# Patient Record
Sex: MALE | Race: WHITE | NOT HISPANIC OR LATINO | Employment: OTHER | URBAN - METROPOLITAN AREA
[De-identification: names, ages, dates, MRNs, and addresses within clinical notes are randomized per-mention and may not be internally consistent; named-entity substitution may affect disease eponyms.]

---

## 2017-02-27 ENCOUNTER — OFFICE VISIT (OUTPATIENT)
Dept: MEDICAL GROUP | Facility: MEDICAL CENTER | Age: 72
End: 2017-02-27
Payer: MEDICARE

## 2017-02-27 VITALS
BODY MASS INDEX: 23.98 KG/M2 | DIASTOLIC BLOOD PRESSURE: 52 MMHG | WEIGHT: 177 LBS | RESPIRATION RATE: 16 BRPM | SYSTOLIC BLOOD PRESSURE: 98 MMHG | OXYGEN SATURATION: 96 % | TEMPERATURE: 98.9 F | HEART RATE: 63 BPM | HEIGHT: 72 IN

## 2017-02-27 DIAGNOSIS — I48.0 PAF (PAROXYSMAL ATRIAL FIBRILLATION) (HCC): ICD-10-CM

## 2017-02-27 DIAGNOSIS — E78.5 HYPERLIPIDEMIA, UNSPECIFIED HYPERLIPIDEMIA TYPE: ICD-10-CM

## 2017-02-27 DIAGNOSIS — Z00.00 MEDICARE ANNUAL WELLNESS VISIT, SUBSEQUENT: ICD-10-CM

## 2017-02-27 DIAGNOSIS — I10 ESSENTIAL HYPERTENSION, BENIGN: ICD-10-CM

## 2017-02-27 DIAGNOSIS — Z86.73 HISTORY OF CVA (CEREBROVASCULAR ACCIDENT): ICD-10-CM

## 2017-02-27 DIAGNOSIS — M17.12 PRIMARY OSTEOARTHRITIS OF LEFT KNEE: ICD-10-CM

## 2017-02-27 PROCEDURE — 1036F TOBACCO NON-USER: CPT | Performed by: INTERNAL MEDICINE

## 2017-02-27 PROCEDURE — G8432 DEP SCR NOT DOC, RNG: HCPCS | Performed by: INTERNAL MEDICINE

## 2017-02-27 PROCEDURE — G0439 PPPS, SUBSEQ VISIT: HCPCS | Performed by: INTERNAL MEDICINE

## 2017-02-27 ASSESSMENT — PATIENT HEALTH QUESTIONNAIRE - PHQ9: CLINICAL INTERPRETATION OF PHQ2 SCORE: 0

## 2017-02-27 ASSESSMENT — PAIN SCALES - GENERAL: PAINLEVEL: NO PAIN

## 2017-02-27 NOTE — MR AVS SNAPSHOT
"        Babar Willett   2017 8:20 AM   Office Visit   MRN: [id number]    Department:  [address] [place] [address]   Dept Phone:  [phone]    Description:  Male : 1945   Provider:  Binu Dave M.D.           Reason for Visit     Annual Wellness Visit           Allergies as of 2017     No Known Allergies      You were diagnosed with     Essential hypertension, benign   [401.1.ICD-9-CM]       Hyperlipidemia, unspecified hyperlipidemia type   [5985770]       History of CVA (cerebrovascular accident)   [190629]       Medicare annual wellness visit, subsequent   [744697]       PAF (paroxysmal atrial fibrillation) (CMS-[facility])   [646816]       Primary osteoarthritis of left knee   [563550]         Vital Signs     Blood Pressure Pulse Temperature Respirations Height Weight    98/52 mmHg 63 37.2 °C (98.9 °F) 16 1.829 m (6' 0.01\") 80.287 kg (177 lb)    Body Mass Index Oxygen Saturation Smoking Status             24.00 kg/m2 96% Former Smoker         Basic Information     Date Of Birth Sex Race Ethnicity Preferred Language    1945 Male White Non- English      Your appointments     Aug 30, 2017  1:20 PM   Established Patient with Binu Dave M.D.   [facility] [address] ([place] [facility])    [address] [facility] [address]  [facility] [id number] [phone]           You will be receiving a confirmation call a few days before your appointment from our automated call confirmation system.              Problem List              ICD-10-CM Priority Class Noted - Resolved    History of CVA (cerebrovascular accident) Z86.73   2015 - Present    PAF (paroxysmal atrial fibrillation) (CMS-HCC) I48.0   2015 - Present    Medicare annual wellness visit, subsequent Z00.00   2015 - Present    Essential hypertension, benign I10   2015 - Present    Hyperlipidemia E78.5   2016 - Present    Primary osteoarthritis of left knee M17.12   2016 - Present      Health Maintenance        Date Due " Completion Dates    COLONOSCOPY 2/18/1997 ---    IMM ZOSTER VACCINE 2/18/2007 ---    IMM DTaP/Tdap/Td Vaccine (2 - Td) 2/5/2023 2/5/2013            Current Immunizations     13-VALENT PCV PREVNAR 10/17/2015    Influenza Vaccine Adult HD 9/17/2016, 10/17/2015, 10/14/2012    Influenza Vaccine Quad Inj (Preserved) 10/29/2013, 10/14/2011, 10/9/2011, 10/9/2010    Pneumococcal polysaccharide vaccine (PPSV-23) 12/30/2014, 9/17/2014    Tdap Vaccine 2/5/2013      Below and/or attached are the medications your provider expects you to take. Review all of your home medications and newly ordered medications with your provider and/or pharmacist. Follow medication instructions as directed by your provider and/or pharmacist. Please keep your medication list with you and share with your provider. Update the information when medications are discontinued, doses are changed, or new medications (including over-the-counter products) are added; and carry medication information at all times in the event of emergency situations     Allergies:  No Known Allergies          Medications  Valid as of: February 27, 2017 -  8:34 AM    Generic Name Brand Name Tablet Size Instructions for use    Atorvastatin Calcium (Tab) LIPITOR 10 MG Take 10 mg by mouth every evening.        Metoprolol Succinate (TABLET SR 24 HR) TOPROL XL 50 MG Take 50 mg by mouth every day.        Metoprolol Tartrate (Tab) LOPRESSOR 25 MG Take 25 mg by mouth 2 times a day.        Ramipril (Cap) ALTACE 2.5 MG Take 2.5 mg by mouth every day.        Rivaroxaban (Tab) XARELTO 20 MG Take 20 mg by mouth with dinner.        .                 Medicines prescribed today were sent to:     [id number] - [place], [place] - [address]    [address] [place] [address]    Phone: [phone] Fax: [phone]    Open 24 Hours?: No      Medication refill instructions:       If your prescription bottle indicates you have medication refills left, it is not necessary to call your  provider’s office. Please contact your pharmacy and they will refill your medication.    If your prescription bottle indicates you do not have any refills left, you may request refills at any time through one of the following ways: The online [employer]-Drive system (except Urgent Care), by calling your provider’s office, or by asking your pharmacy to contact your provider’s office with a refill request. Medication refills are processed only during regular business hours and may not be available until the next business day. Your provider may request additional information or to have a follow-up visit with you prior to refilling your medication.   *Please Note: Medication refills are assigned a new Rx number when refilled electronically. Your pharmacy may indicate that no refills were authorized even though a new prescription for the same medication is available at the pharmacy. Please request the medicine by name with the pharmacy before contacting your provider for a refill.        Your To Do List     Future Labs/Procedures Complete By Expires    BASIC METABOLIC PANEL  As directed 2/28/2018    LIPID PROFILE  As directed 2/28/2018         Hy-Drive Access Code: Activation code not generated  Current Hy-Drive Status: Active

## 2017-02-27 NOTE — ASSESSMENT & PLAN NOTE
He continues atorvastatin. He did not yet get lab tests drawn. We reviewed appropriate diet which he is following. Prior lipid panels were quite good.

## 2017-02-27 NOTE — ASSESSMENT & PLAN NOTE
He is currently wearing a brace over his left knee. He anticipates having a knee replacement in the next few months.

## 2017-02-27 NOTE — PROGRESS NOTES
Chief Complaint   Patient presents with   • Annual Wellness Visit         HPI:  Babar is a 72 y.o. male here for Medicare Annual Wellness Visit and follow-up of his various health problems    Patient Active Problem List    Diagnosis Date Noted   • Primary osteoarthritis of left knee 06/14/2016   • Hyperlipidemia 02/22/2016   • Essential hypertension, benign 08/21/2015   • History of CVA (cerebrovascular accident) 02/20/2015   • PAF (paroxysmal atrial fibrillation) (CMS-HCC) 02/20/2015   • Medicare annual wellness visit, subsequent 02/20/2015       Current Outpatient Prescriptions   Medication Sig Dispense Refill   • metoprolol SR (TOPROL XL) 50 MG TABLET SR 24 HR Take 50 mg by mouth every day.     • ramipril (ALTACE) 2.5 MG Cap Take 2.5 mg by mouth every day.     • atorvastatin (LIPITOR) 10 MG TABS Take 10 mg by mouth every evening.     • rivaroxaban (XARELTO) 20 MG TABS tablet Take 20 mg by mouth with dinner.     • metoprolol (LOPRESSOR) 25 MG TABS Take 25 mg by mouth 2 times a day.       No current facility-administered medications for this visit.        The patient reports adherence to this regimen   Current supplements as per medication list.   Chronic narcotic pain medicines: no    Allergies: Review of patient's allergies indicates no known allergies.    Current social contact/activities: visit with friends, active around the house      Is patient current with immunizations?  Yes      He  reports that he has quit smoking. He has never used smokeless tobacco. He reports that he drinks about 4.2 oz of alcohol per week. He reports that he does not use illicit drugs.  Counseling given: Yes        DPA/Advanced Directive:  Patient does have an advanced directive in Epic.    ROS:    Gait: Uses no assistive device   Ostomy: no   Other tubes: no   Amputations: no   Chronic oxygen use no   Last eye exam 02/2017   : Denies incontinence. He denies significant lightheadedness or headaches or change in vision or hearing or  swallowing. He's had no difficulty with his speech. He denies dyspnea or wheezing or coughing or chest pain or palpitations or indigestion or change in bowel habits or change in urinating. He denies significant ankle swelling. He has arthritis involving his left knee.    Depression Screening    Little interest or pleasure in doing things?  0 - not at all  Feeling down, depressed, or hopeless?  0 - not at all  Patient Health Questionnaire Score: 0    If depressive symptoms identified deferred to follow up visit unless specifically addressed in assessment and plan.    Screening for Cognitive Impairment    Three Minute Recall (banana, sunrise, fence)   3/3    Draw clock face with all 12 numbers set to the hand to show 10 minutes past 11 o'clock  1 5/5  Cognitive concerns identified deferred for follow up unless specifically addressed in assessment and plan.    Fall Risk Assessment    Has the patient had two or more falls in the last year or any fall with injury in the last year?  No    Safety Assessment    Throw rugs on floor.  Yes  Handrails on all stairs.  Yes  Good lighting in all hallways.  Yes  Difficulty hearing.  No  Patient counseled about all safety risks that were identified.    Functional Assessment ADLs    Are there any barriers preventing you from cooking for yourself or meeting nutritional needs?  No.    Are there any barriers preventing you from driving safely or obtaining transportation?  No.    Are there any barriers preventing you from using a telephone or calling for help?  No.    Are there any barriers preventing you from shopping?  No.    Are there any barriers preventing you from taking care of your own finances?  No.    Are there any barriers preventing you from managing your medications?  No.    Are currently engaging any exercise or physical activity?  Yes.       Health Maintenance Summary                COLONOSCOPY Overdue 2/18/1997     IMM ZOSTER VACCINE Overdue 2/18/2007     IMM PNEUMOCOCCAL  "65+ (ADULT) LOW/MEDIUM RISK SERIES Overdue 2/5/2014      Done 2/5/2013 Imm Admin: Pneumococcal Conjugate Vaccine (Prevnar/PCV-13)    IMM INFLUENZA Overdue 9/1/2016     Annual Wellness Visit Next Due 8/25/2017      Done 8/24/2016 Visit Dx: Medicare annual wellness visit, subsequent     Patient has more history with this topic...    IMM DTaP/Tdap/Td Vaccine Next Due 2/5/2023      Done 2/5/2013 Imm Admin: Tdap Vaccine          Patient Care Team:  Binu Dave M.D. as PCP - General (Internal Medicine)  Chava Velez D.O. as Consulting Physician (Cardiology)  Dirk Bess M.D. as Consulting Physician (Neurology)    Social History   Substance Use Topics   • Smoking status: Former Smoker -- 2 years   • Smokeless tobacco: Never Used   • Alcohol Use: 4.2 oz/week     7 Glasses of wine per week     Family History   Problem Relation Age of Onset   • Heart Disease Father    • Heart Attack Father    • Diabetes Sister    • Heart Disease Sister    • Arthritis Sister    • Stroke Brother      He  has a past medical history of History of CVA (cerebrovascular accident) (2/20/2015); PAF (paroxysmal atrial fibrillation) (CMS-HCC) (2/20/2015); Medicare annual wellness visit, subsequent (2/20/2015); Allergy; Stroke (CMS-[facility]); and TIA (transient ischemic attack).   Past Surgical History   Procedure Laterality Date   • Knee arthroscopy     • Inguinal hernia repair       Hernia Repair, Inguinal           Exam:     Blood pressure 98/52, pulse 63, temperature 37.2 °C (98.9 °F), resp. rate 16, height 1.829 m (6' 0.01\"), weight 80.287 kg (177 lb), SpO2 96 %. Body mass index is 24 kg/(m^2).    Hearing good.   Wears hearing aids   Dentition good  Alert, oriented in no acute distress.  Eye contact is good, speech goal directed, affect calm. Fundi are poorly visualized because of small pupils but pupils are equal, round, and reactive to light. Ears are clear, pharynx is unremarkable, neck is supple and without lymphadenopathy. Lungs are " clear without wheezes or rales. Cardiovascular peripheral circulation is satisfactory. Heart sounds are unremarkable. Heart rhythm is regular. Abdomen is soft and without masses or tenderness. There are normal bowel sounds. Genitalia are normal male. Rectal exam is unremarkable with prostate 2-3+ in size and a fairly normal contour and consistency. Extremities are without significant edema, cyanosis, or deformity. Neurologic exam is unremarkable with essentially normal sensation, strength, gait, and cerebellar function.      Assessment and Plan. The following treatment and monitoring plan is recommended:      History of CVA (cerebrovascular accident)  Prior CVA. No recent neurologic changes. He continues to be anticoagulated with Xarelto.    PAF (paroxysmal atrial fibrillation)  He denies recent palpitations or lightheadedness. He is anticoagulated with Xarelto. He denies unusual bleeding or bruising. He is followed for this by Dr. Velez.    Essential hypertension, benign  Blood pressures under good control with ramapril and metoprolol. He denies lightheadedness.    Hyperlipidemia  He continues atorvastatin. He did not yet get lab tests drawn. We reviewed appropriate diet which he is following. Prior lipid panels were quite good.    Primary osteoarthritis of left knee  He is currently wearing a brace over his left knee. He anticipates having a knee replacement in the next few months.            Services needed: No services needed at this time  Health Care Screening recommendations as per orders if indicated.  Referrals offered: PT/OT/Nutrition counseling/Behavioral Health/Smoking cessation as per orders if indicated.    Discussion today about general wellness and lifestyle habits:    · Prevent falls and reduce trip hazards; Cautioned about securing or removing rugs.  · Have a working fire alarm and carbon monoxide detector;   · Engage in regular physical activity and social activities         Follow-up: 6 months  if not sooner.

## 2017-02-27 NOTE — ASSESSMENT & PLAN NOTE
He denies recent palpitations or lightheadedness. He is anticoagulated with Xarelto. He denies unusual bleeding or bruising. He is followed for this by Dr. Velez.

## 2017-08-18 ENCOUNTER — HOSPITAL ENCOUNTER (OUTPATIENT)
Dept: LAB | Facility: MEDICAL CENTER | Age: 72
End: 2017-08-18
Attending: INTERNAL MEDICINE
Payer: MEDICARE

## 2017-08-18 DIAGNOSIS — I10 ESSENTIAL HYPERTENSION, BENIGN: ICD-10-CM

## 2017-08-18 DIAGNOSIS — E78.5 HYPERLIPIDEMIA: ICD-10-CM

## 2017-08-18 LAB
ANION GAP SERPL CALC-SCNC: 8 MMOL/L (ref 0–11.9)
BUN SERPL-MCNC: 12 MG/DL (ref 8–22)
CALCIUM SERPL-MCNC: 9 MG/DL (ref 8.5–10.5)
CHLORIDE SERPL-SCNC: 107 MMOL/L (ref 96–112)
CHOLEST SERPL-MCNC: 168 MG/DL (ref 100–199)
CO2 SERPL-SCNC: 26 MMOL/L (ref 20–33)
CREAT SERPL-MCNC: 0.83 MG/DL (ref 0.5–1.4)
GFR SERPL CREATININE-BSD FRML MDRD: >60 ML/MIN/1.73 M 2
GLUCOSE SERPL-MCNC: 97 MG/DL (ref 65–99)
HDLC SERPL-MCNC: 88 MG/DL
LDLC SERPL CALC-MCNC: 71 MG/DL
POTASSIUM SERPL-SCNC: 4.2 MMOL/L (ref 3.6–5.5)
SODIUM SERPL-SCNC: 141 MMOL/L (ref 135–145)
TRIGL SERPL-MCNC: 44 MG/DL (ref 0–149)

## 2017-08-18 PROCEDURE — 36415 COLL VENOUS BLD VENIPUNCTURE: CPT

## 2017-08-18 PROCEDURE — 80048 BASIC METABOLIC PNL TOTAL CA: CPT

## 2017-08-18 PROCEDURE — 80061 LIPID PANEL: CPT

## 2017-08-30 ENCOUNTER — OFFICE VISIT (OUTPATIENT)
Dept: MEDICAL GROUP | Facility: MEDICAL CENTER | Age: 72
End: 2017-08-30
Payer: MEDICARE

## 2017-08-30 VITALS
DIASTOLIC BLOOD PRESSURE: 72 MMHG | OXYGEN SATURATION: 97 % | BODY MASS INDEX: 23.19 KG/M2 | HEART RATE: 67 BPM | RESPIRATION RATE: 16 BRPM | WEIGHT: 175 LBS | TEMPERATURE: 98.4 F | SYSTOLIC BLOOD PRESSURE: 110 MMHG | HEIGHT: 73 IN

## 2017-08-30 DIAGNOSIS — E78.5 HYPERLIPIDEMIA, UNSPECIFIED HYPERLIPIDEMIA TYPE: ICD-10-CM

## 2017-08-30 DIAGNOSIS — Z12.11 SCREEN FOR COLON CANCER: ICD-10-CM

## 2017-08-30 DIAGNOSIS — Z86.73 HISTORY OF CVA (CEREBROVASCULAR ACCIDENT): ICD-10-CM

## 2017-08-30 DIAGNOSIS — I10 ESSENTIAL HYPERTENSION, BENIGN: ICD-10-CM

## 2017-08-30 DIAGNOSIS — I48.0 PAF (PAROXYSMAL ATRIAL FIBRILLATION) (HCC): ICD-10-CM

## 2017-08-30 PROCEDURE — 99214 OFFICE O/P EST MOD 30 MIN: CPT | Performed by: INTERNAL MEDICINE

## 2017-08-30 NOTE — ASSESSMENT & PLAN NOTE
He continues atorvastatin for his hyperlipidemia. Most recent cholesterol is 168, triglycerides 44, HDL 88, LDL 71. He tries to follow appropriate diet. He exercises regularly.

## 2017-08-30 NOTE — ASSESSMENT & PLAN NOTE
He is due for repeat colonoscopy or stool. Test. He has elected to do the stool fit test. He denies recent change in bowel habits.

## 2017-08-30 NOTE — PROGRESS NOTES
Subjective:     Chief Complaint   Patient presents with   • Follow-Up     routine f/u     Babar Willett is a 72 y.o. male here today forFollow-up of his various health problems.    Screen for colon cancer  He is due for repeat colonoscopy or stool. Test. He has elected to do the stool fit test. He denies recent change in bowel habits.    PAF (paroxysmal atrial fibrillation)  He denies recent palpitations or lightheadedness. He continues anticoagulation with Xarelto.    Hyperlipidemia  He continues atorvastatin for his hyperlipidemia. Most recent cholesterol is 168, triglycerides 44, HDL 88, LDL 71. He tries to follow appropriate diet. He exercises regularly.    History of CVA (cerebrovascular accident)  Prior CVA, no current evidence of any neurologic changes that he is aware of.    Essential hypertension, benign  He continues ramipril for his hypertension. Blood pressure is satisfactory. He denies lightheadedness.       Diagnoses of Essential hypertension, benign, Hyperlipidemia, unspecified hyperlipidemia type, Screen for colon cancer, PAF (paroxysmal atrial fibrillation) (CMS-[facility]), and History of CVA (cerebrovascular accident) were pertinent to this visit.    Allergies: Review of patient's allergies indicates no known allergies.  Current medicines (including changes today)  Current Outpatient Prescriptions   Medication Sig Dispense Refill   • metoprolol SR (TOPROL XL) 50 MG TABLET SR 24 HR Take 50 mg by mouth every day.     • ramipril (ALTACE) 2.5 MG Cap Take 2.5 mg by mouth every day.     • atorvastatin (LIPITOR) 10 MG TABS Take 10 mg by mouth every evening.     • rivaroxaban (XARELTO) 20 MG TABS tablet Take 20 mg by mouth with dinner.     • metoprolol (LOPRESSOR) 25 MG TABS Take 25 mg by mouth 2 times a day.       No current facility-administered medications for this visit.        He  has a past medical history of Allergy; History of CVA (cerebrovascular accident) (2/20/2015); Medicare annual wellness visit,  "subsequent (2/20/2015); PAF (paroxysmal atrial fibrillation) (CMS-HCC) (2/20/2015); Stroke (CMS-HCC); and TIA (transient ischemic attack).  Health Maintenance: He thinks he is up-to-date on everything. He will try to bring in a date of his Zostavax.  ROS    Patient denies significant change in strength, weight or appetite.  No significant lightheadedness or headaches.  No change in vision, hearing, or swallowing.  No new dyspnea, coughing, chest pain, or palpitations.  No indigestion, abdominal pain, or change in bowel habits.  No change in urinating.  No new ankle swelling.       Objective:     PE:  /72   Pulse 67   Temp 36.9 °C (98.4 °F)   Resp 16   Ht 1.854 m (6' 1\")   Wt 79.4 kg (175 lb)   SpO2 97%   BMI 23.09 kg/m²    Neck is supple without significant lymphadenopathy or masses.  Lungs are clear with normal breath sounds without wheezes or rales .  Cardiovascular: peripheral circulation is satisfactory, heart sounds are unchanged and unremarkable.  Abdomen is soft, without masses or tenderness, with normal bowel sounds.  Extremities are without significant edema, cyanosis or deformity.      Assessment and Plan:   The following treatment plan was discussed  1. Essential hypertension, benign  BASIC METABOLIC PANEL    No medication change. We reviewed low-salt diet.   2. Hyperlipidemia, unspecified hyperlipidemia type  LIPID PROFILE    Continue atorvastatin. We reviewed appropriate diet.   3. Screen for colon cancer  OCCULT BLOOD FECES IMMUNOASSAY (FIT)    He was given a kit for stool fit test.   4. PAF (paroxysmal atrial fibrillation) (CMS-[facility])      He will report any significant palpitations or lightheadedness.   5. History of CVA (cerebrovascular accident)      He was instructed to go to the emergency room if he notes any neurologic changes.       Followup: Return in about 5 months (around 2/12/2018), or wellness.           "

## 2017-08-30 NOTE — ASSESSMENT & PLAN NOTE
He continues ramipril for his hypertension. Blood pressure is satisfactory. He denies lightheadedness.

## 2017-09-29 ENCOUNTER — HOSPITAL ENCOUNTER (OUTPATIENT)
Facility: MEDICAL CENTER | Age: 72
End: 2017-09-29
Attending: INTERNAL MEDICINE
Payer: MEDICARE

## 2017-09-29 PROCEDURE — 82274 ASSAY TEST FOR BLOOD FECAL: CPT

## 2017-10-03 DIAGNOSIS — Z12.11 SCREEN FOR COLON CANCER: ICD-10-CM

## 2017-10-03 LAB — HEMOCCULT STL QL IA: NEGATIVE

## 2017-10-13 ENCOUNTER — OFFICE VISIT (OUTPATIENT)
Dept: MEDICAL GROUP | Facility: MEDICAL CENTER | Age: 72
End: 2017-10-13
Payer: MEDICARE

## 2017-10-13 VITALS
OXYGEN SATURATION: 97 % | SYSTOLIC BLOOD PRESSURE: 110 MMHG | DIASTOLIC BLOOD PRESSURE: 70 MMHG | TEMPERATURE: 97.9 F | BODY MASS INDEX: 23.59 KG/M2 | HEIGHT: 73 IN | WEIGHT: 178 LBS | RESPIRATION RATE: 12 BRPM | HEART RATE: 55 BPM

## 2017-10-13 DIAGNOSIS — R22.2 MASS OF RIGHT CHEST WALL: ICD-10-CM

## 2017-10-13 PROCEDURE — 99213 OFFICE O/P EST LOW 20 MIN: CPT | Performed by: INTERNAL MEDICINE

## 2017-10-13 ASSESSMENT — PAIN SCALES - GENERAL: PAINLEVEL: NO PAIN

## 2017-10-13 NOTE — ASSESSMENT & PLAN NOTE
This patient reports that on Wednesday he noticed a tender lump on the right chest wall just lateral to the areola. It was tender. He is not sure but thinks that it is better today. He reports that last Friday he was moving some heavy wood rounds and may have injured himself. He is not taking any new supplements and has not eaten much soy or tofu. He did have a prior left chest wall mass that turned out to be nothing.

## 2017-10-13 NOTE — PROGRESS NOTES
"Subjective:     Chief Complaint   Patient presents with   • Breast Mass     right side noticed on wed James STERLING Willett is a 72 y.o. male here today forEvaluation of a right chest wall mass.    Mass of right chest wall  This patient reports that on Wednesday he noticed a tender lump on the right chest wall just lateral to the areola. It was tender. He is not sure but thinks that it is better today. He reports that last Friday he was moving some heavy wood rounds and may have injured himself. He is not taking any new supplements and has not eaten much soy or tofu. He did have a prior left chest wall mass that turned out to be nothing.       The encounter diagnosis was Mass of right chest wall.    Allergies: Review of patient's allergies indicates no known allergies.  Current medicines (including changes today)  Current Outpatient Prescriptions   Medication Sig Dispense Refill   • metoprolol SR (TOPROL XL) 50 MG TABLET SR 24 HR Take 50 mg by mouth every day.     • ramipril (ALTACE) 2.5 MG Cap Take 2.5 mg by mouth every day.     • atorvastatin (LIPITOR) 10 MG TABS Take 10 mg by mouth every evening.     • rivaroxaban (XARELTO) 20 MG TABS tablet Take 20 mg by mouth with dinner.     • metoprolol (LOPRESSOR) 25 MG TABS Take 25 mg by mouth 2 times a day.       No current facility-administered medications for this visit.        He  has a past medical history of Allergy; History of CVA (cerebrovascular accident) (2/20/2015); Medicare annual wellness visit, subsequent (2/20/2015); PAF (paroxysmal atrial fibrillation) (CMS-[facility]) (2/20/2015); Stroke (CMS-HCC); and TIA (transient ischemic attack).    ROS  No new dyspnea, coughing, chest pain, or palpitations.  No indigestion, abdominal pain, or change in bowel habits.         Objective:     PE:  /70   Pulse (!) 55   Temp 36.6 °C (97.9 °F)   Resp 12   Ht 1.854 m (6' 0.99\")   Wt 80.7 kg (178 lb)   SpO2 97%   BMI 23.49 kg/m²    Neck is supple without significant " lymphadenopathy or masses.  Lungs are clear with normal breath sounds without wheezes or rales .  Cardiovascular: peripheral circulation is satisfactory, heart sounds are unchanged and unremarkable. There is a slightly oblong mass measuring about a centimeter just lateral to the right areola. There is no other significant palpable abnormality nor area of tenderness. There is no nipple discharge.  Abdomen is soft, without masses or tenderness, with normal bowel sounds.        Assessment and Plan:   The following treatment plan was discussed  1. Mass of right chest wall      Since it is improving, report back next week. If it is not resolved then get a mammogram and evaluate further as indicated.       Followup:He will report back to us next week especially since it seems to be improving.  If it is not totally resolved, we will proceed with mammogram. He otherwise was encouraged to keep his next scheduled appointment.

## 2017-11-06 DIAGNOSIS — N62 GYNECOMASTIA, MALE: ICD-10-CM

## 2017-11-20 ENCOUNTER — HOSPITAL ENCOUNTER (OUTPATIENT)
Dept: RADIOLOGY | Facility: MEDICAL CENTER | Age: 72
End: 2017-11-20

## 2017-11-21 ENCOUNTER — HOSPITAL ENCOUNTER (OUTPATIENT)
Dept: RADIOLOGY | Facility: MEDICAL CENTER | Age: 72
End: 2017-11-21
Attending: INTERNAL MEDICINE
Payer: MEDICARE

## 2017-11-21 DIAGNOSIS — N62 GYNECOMASTIA, MALE: ICD-10-CM

## 2017-11-21 PROCEDURE — 76642 ULTRASOUND BREAST LIMITED: CPT | Mod: RT

## 2017-11-21 PROCEDURE — G0279 TOMOSYNTHESIS, MAMMO: HCPCS

## 2018-02-08 DIAGNOSIS — H91.10 PRESBYCUSIS, UNSPECIFIED LATERALITY: ICD-10-CM

## 2018-02-21 ENCOUNTER — HOSPITAL ENCOUNTER (OUTPATIENT)
Dept: LAB | Facility: MEDICAL CENTER | Age: 73
End: 2018-02-21
Attending: INTERNAL MEDICINE
Payer: MEDICARE

## 2018-02-21 DIAGNOSIS — I10 ESSENTIAL HYPERTENSION, BENIGN: ICD-10-CM

## 2018-02-21 DIAGNOSIS — E78.5 HYPERLIPIDEMIA, UNSPECIFIED HYPERLIPIDEMIA TYPE: ICD-10-CM

## 2018-02-21 LAB
ANION GAP SERPL CALC-SCNC: 4 MMOL/L (ref 0–11.9)
BUN SERPL-MCNC: 13 MG/DL (ref 8–22)
CALCIUM SERPL-MCNC: 9.2 MG/DL (ref 8.5–10.5)
CHLORIDE SERPL-SCNC: 105 MMOL/L (ref 96–112)
CHOLEST SERPL-MCNC: 172 MG/DL (ref 100–199)
CO2 SERPL-SCNC: 28 MMOL/L (ref 20–33)
CREAT SERPL-MCNC: 0.91 MG/DL (ref 0.5–1.4)
GLUCOSE SERPL-MCNC: 103 MG/DL (ref 65–99)
HDLC SERPL-MCNC: 86 MG/DL
LDLC SERPL CALC-MCNC: 74 MG/DL
POTASSIUM SERPL-SCNC: 4.4 MMOL/L (ref 3.6–5.5)
SODIUM SERPL-SCNC: 137 MMOL/L (ref 135–145)
TRIGL SERPL-MCNC: 60 MG/DL (ref 0–149)

## 2018-02-21 PROCEDURE — 36415 COLL VENOUS BLD VENIPUNCTURE: CPT

## 2018-02-21 PROCEDURE — 80061 LIPID PANEL: CPT

## 2018-02-21 PROCEDURE — 80048 BASIC METABOLIC PNL TOTAL CA: CPT

## 2018-02-27 ENCOUNTER — TELEPHONE (OUTPATIENT)
Dept: MEDICAL GROUP | Facility: MEDICAL CENTER | Age: 73
End: 2018-02-27

## 2018-02-27 NOTE — TELEPHONE ENCOUNTER
Future Appointments       Provider Department Center    2/28/2018 3:40 PM Binu Dave M.D.; [address]          ANNUAL WELLNESS VISIT PRE-VISIT PLANNING WITHOUT OUTREACH    1.  Reviewed note from last office visit with PCP: YES Last [id number]    2.  If any orders were placed at last visit, do we have Results/Consult Notes?        •  Labs - Labs ordered, completed on 10/21/18 and results are in chart.       •  Imaging - Imaging ordered, completed and results are in chart. 02/15/18 MR KNEE       •  Referrals - Referral ordered, patient was seen and consult notes are in chart. Care Teams updated  YES.    3.  Immunizations were updated in [employer] using [employer]?: Yes       •  [employer] Recommendations: ZOSTAVAX (Shingles)        •  Is patient due for Tdap? NO       •  Is patient due for Shingles? YES. Patient was not notified of copay/out of pocket cost.     4.  Patient is due for the following Health Maintenance Topics:   Health Maintenance Due   Topic Date Due   • IMM ZOSTER VACCINE  NEEDS SCRIPT       5.  Reviewed/Updated the following with patient:       •   Preferred Pharmacy? YES       •   Preferred Lab? YES       •   Preferred Communication? YES       •   Allergies? YES       •   Medications? YES. Was Abstract Encounter opened and chart updated? YES       •   Social History? YES. Was Abstract Encounter opened and chart updated? YES       •   Family History (document living status of immediate family members and if + hx of  cancer, diabetes, hypertension, hyperlipidemia, heart attack, stroke) YES. Was Abstract Encounter opened and chart updated? YES    6.  Care Team Updated:       •   DME Company (gait device, O2, CPAP, etc.): YES       •   Other Specialists (eye doctor, derm, GYN, cardiology, endo, etc): YES    7.  Patient has the following Care Path diagnoses on Problem List:  NONE    8.  MDX printed and highlighted for Provider? MCR    9.  Patient was advised:  “This is a free wellness visit. The provider will screen for medical conditions to help you stay healthy. If you have other concerns to address you may be asked to discuss these at a separate visit or there may be an additional fee.”     10.  Patient was informed to arrive 15 min prior to their scheduled appointment and bring in their medication bottles.

## 2018-02-28 ENCOUNTER — OFFICE VISIT (OUTPATIENT)
Dept: MEDICAL GROUP | Facility: MEDICAL CENTER | Age: 73
End: 2018-02-28
Payer: MEDICARE

## 2018-02-28 VITALS
TEMPERATURE: 98.7 F | DIASTOLIC BLOOD PRESSURE: 64 MMHG | SYSTOLIC BLOOD PRESSURE: 110 MMHG | BODY MASS INDEX: 24.24 KG/M2 | WEIGHT: 179 LBS | HEART RATE: 77 BPM | OXYGEN SATURATION: 99 % | RESPIRATION RATE: 16 BRPM | HEIGHT: 72 IN

## 2018-02-28 DIAGNOSIS — Z86.73 HISTORY OF CVA (CEREBROVASCULAR ACCIDENT): ICD-10-CM

## 2018-02-28 DIAGNOSIS — I10 ESSENTIAL HYPERTENSION, BENIGN: ICD-10-CM

## 2018-02-28 DIAGNOSIS — Z00.00 MEDICARE ANNUAL WELLNESS VISIT, SUBSEQUENT: ICD-10-CM

## 2018-02-28 DIAGNOSIS — E78.5 HYPERLIPIDEMIA, UNSPECIFIED HYPERLIPIDEMIA TYPE: ICD-10-CM

## 2018-02-28 DIAGNOSIS — R22.2 MASS OF RIGHT CHEST WALL: ICD-10-CM

## 2018-02-28 DIAGNOSIS — I48.0 PAF (PAROXYSMAL ATRIAL FIBRILLATION) (HCC): ICD-10-CM

## 2018-02-28 PROCEDURE — G0439 PPPS, SUBSEQ VISIT: HCPCS | Performed by: INTERNAL MEDICINE

## 2018-02-28 ASSESSMENT — PAIN SCALES - GENERAL: PAINLEVEL: 2=MINIMAL-SLIGHT

## 2018-02-28 ASSESSMENT — ACTIVITIES OF DAILY LIVING (ADL): BATHING_REQUIRES_ASSISTANCE: 0

## 2018-02-28 ASSESSMENT — PATIENT HEALTH QUESTIONNAIRE - PHQ9: CLINICAL INTERPRETATION OF PHQ2 SCORE: 0

## 2018-02-28 NOTE — PROGRESS NOTES
Chief Complaint   Patient presents with   • Annual Wellness Visit         HPI:  Babar is a 73 y.o. here for Medicare Annual Wellness Visit    Patient Active Problem List    Diagnosis Date Noted   • Mass of right chest wall 10/13/2017   • Screen for colon cancer 08/30/2017   • Hyperlipidemia 02/22/2016   • Essential hypertension, benign 08/21/2015   • History of CVA (cerebrovascular accident) 02/20/2015   • PAF (paroxysmal atrial fibrillation) (CMS-HCC) 02/20/2015   • Medicare annual wellness visit, subsequent 02/20/2015       Current Outpatient Prescriptions   Medication Sig Dispense Refill   • metoprolol SR (TOPROL XL) 50 MG TABLET SR 24 HR Take 50 mg by mouth every day.     • ramipril (ALTACE) 2.5 MG Cap Take 2.5 mg by mouth every day.     • atorvastatin (LIPITOR) 10 MG TABS Take 10 mg by mouth every evening.     • rivaroxaban (XARELTO) 20 MG TABS tablet Take 20 mg by mouth with dinner.     • metoprolol (LOPRESSOR) 25 MG TABS Take 25 mg by mouth 2 times a day.       No current facility-administered medications for this visit.         Patient is taking medications as noted in medication list.  Current supplements as per medication list.     Allergies: Patient has no known allergies.    Current social contact/activities: Visit with family and friends, Working as a  12 hrs 4x per wk,      Is patient current with immunizations? No, due for ZOSTAVAX (Shingles). Patient is interested in receiving NONE today.    He  reports that he quit smoking about 49 years ago. His smoking use included Cigarettes. He has a 1.00 pack-year smoking history. He has never used smokeless tobacco. He reports that he drinks about 4.2 oz of alcohol per week . He reports that he does not use drugs.  Counseling given: Not Answered        DPA/Advanced directive: Patient has Advanced Directive on file.     ROS:    Gait: Uses no assistive device   Ostomy: no   Other tubes: no   Amputations: no   Chronic oxygen use no   Last eye exam  02/13/18   Wears hearing aids: yes  He denies history of hepatitis, jaundice, liver disease, or bleeding dyscrasia.  : Denies any urinary leakage during the last 6 months     Depression Screening    Little interest or pleasure in doing things?  0 - not at all  Feeling down, depressed, or hopeless? 0 - not at all  Patient Health Questionnaire Score: 0    If depressive symptoms identified deferred to follow up visit unless specifically addressed in assessment and plan.    Interpretation of PHQ-9 Total Score   Score Severity   1-4 No Depression   5-9 Mild Depression   10-14 Moderate Depression   15-19 Moderately Severe Depression   20-27 Severe Depression    Screening for Cognitive Impairment    Three Minute Recall (apple, watch, cheri)   3/3 Village, Kitchen, Baby  Draw clock face with all 12 numbers set to the hand to show 10 minutes past 11 o'clock  1 4/5 Time 3:40  If cognitive concerns identified, deferred for follow up unless specifically addressed in assessment and plan.    Fall Risk Assessment    Has the patient had two or more falls in the last year or any fall with injury in the last year?  No  If fall risk identified, deferred for follow up unless specifically addressed in assessment and plan.    Safety Assessment    Throw rugs on floor.  No  Handrails on all stairs.  Yes  Good lighting in all hallways.  Yes  Difficulty hearing.  No  Patient counseled about all safety risks that were identified.    Functional Assessment ADLs    Are there any barriers preventing you from cooking for yourself or meeting nutritional needs?  No.    Are there any barriers preventing you from driving safely or obtaining transportation?  No.    Are there any barriers preventing you from using a telephone or calling for help?  No.    Are there any barriers preventing you from shopping?  No.    Are there any barriers preventing you from taking care of your own finances?  No.    Are there any barriers preventing you from managing  your medications?  No.    Are there any barriers preventing you from showering/bathing yourself?  No.    Are you currently engaging any exercise or physical activity?  Yes.       Health Maintenance Summary                IMM ZOSTER VACCINE Overdue 2/18/2007     Annual Wellness Visit Overdue 2/28/2018      Done 2/27/2017 Visit Dx: Medicare annual wellness visit, subsequent     Patient has more history with this topic...    COLON CANCER SCREENING ANNUAL FIT Next Due 9/29/2018      Done 9/29/2017 OCCULT BLOOD FECES IMMUNOASSAY     Patient has more history with this topic...    IMM DTaP/Tdap/Td Vaccine Next Due 2/5/2023      Done 2/5/2013 Imm Admin: Tdap Vaccine          Patient Care Team:  Binu Dave M.D. as PCP - General (Internal Medicine)  Chava Velez D.O. as Consulting Physician (Cardiology)  Deborah Alan O.D. as Consulting Physician (Optometry)  JONATHAN Darden. as Consulting Physician (Audiology)  Howard Mckeon M.D. as Consulting Physician (Orthopaedics)    Social History   Substance Use Topics   • Smoking status: Former Smoker     Packs/day: 0.50     Years: 2.00     Types: Cigarettes     Quit date: 1/1/1969   • Smokeless tobacco: Never Used      Comment: Started smoking at age 22   • Alcohol use 4.2 oz/week     7 Glasses of wine per week     Family History   Problem Relation Age of Onset   • Heart Disease Father      Arteriosclerosis    • Heart Attack Father    • Arthritis Sister    • No Known Problems Brother    • Other Mother      Glu[place]oma   • Stroke Brother      TIA   • Prostate cancer Brother    • Heart Disease Sister      Pacemaker   • Other Sister      Cataracts   • Cancer Maternal Aunt      GI     He  has a past medical history of Allergy; History of CVA (cerebrovascular accident) (2/20/2015); Medicare annual wellness visit, subsequent (2/20/2015); PAF (paroxysmal atrial fibrillation) (CMS-HCC) (2/20/2015); Stroke (CMS-[facility]); and TIA (transient ischemic attack).   Past Surgical  History:   Procedure Laterality Date   • INGUINAL HERNIA REPAIR      Hernia Repair, Inguinal   • KNEE ARTHROSCOPY             Exam:     Blood pressure 110/64, pulse 77, temperature 37.1 °C (98.7 °F), resp. rate 16, height 1.829 m (6'), weight 81.2 kg (179 lb), SpO2 99 %. Body mass index is 24.28 kg/m².    Hearing good. Has hearing aids   Dentition fair  Alert, oriented in no acute distress.  Eye contact is good, speech goal directed, affect calm. Neck is supple and without significant lymphadenopathy. Lungs are clear without rales or wheeze. Cardiovascular peripheral circulation is satisfactory. Heart sounds are unremarkable. Heart rhythm is regular. Abdomen is soft without masses or tenderness. There are normal bowel sounds. Extremities are without significant edema, cyanosis, or deformity.    Neurologic exam is unremarkable with essentially normal sensation, strength, gait, and cerebellar functions.      Assessment and Plan. The following treatment and monitoring plan is recommended:      History of CVA (cerebrovascular accident)  Prior stroke with no new neurologic changes. He knows to go to the emergency room if he notes symptoms of a stroke.    PAF (paroxysmal atrial fibrillation)  Paroxysmal atrial fibrillation for which he is anticoagulated with Xarelto. He has not noticed any recent palpitations or lightheadedness.    Essential hypertension, benign  Blood pressure is currently under good control. He denies lightheadedness. He continues ramipril without difficulty. We again reviewed low-salt diet.    Hyperlipidemia  He takes atorvastatin for his hyperlipidemia. He tries to follow appropriate diet. He exercises quite a bit. He is not overweight.    Mass of right chest wall  The right chest wall mass has essentially resolved. Mammogram was unremarkable.    .      Services suggested: No services needed at this time  Health Care Screening recommendations as per orders if indicated.  Referrals offered:  PT/OT/Nutrition counseling/Behavioral Health/Smoking cessation as per orders if indicated.    Discussion today about general wellness and lifestyle habits:    · Prevent falls and reduce trip hazards; Cautioned about securing or removing rugs.  · Have a working fire alarm and carbon monoxide detector;   · Engage in regular physical activity and social activities       Follow-up: He will return in 6 months for follow-up especially of his hypertension and hyperlipidemia. He will contact us otherwise as needed sooner.    He anticipates having meniscus surgery on his right knee this Friday. At this time I see no significant medical contraindication to the contemplated surgery.

## 2018-03-01 NOTE — ASSESSMENT & PLAN NOTE
Prior stroke with no new neurologic changes. He knows to go to the emergency room if he notes symptoms of a stroke.

## 2018-03-01 NOTE — ASSESSMENT & PLAN NOTE
He takes atorvastatin for his hyperlipidemia. He tries to follow appropriate diet. He exercises quite a bit. He is not overweight.

## 2018-03-01 NOTE — ASSESSMENT & PLAN NOTE
Paroxysmal atrial fibrillation for which he is anticoagulated with Xarelto. He has not noticed any recent palpitations or lightheadedness.

## 2018-03-01 NOTE — ASSESSMENT & PLAN NOTE
Blood pressure is currently under good control. He denies lightheadedness. He continues ramipril without difficulty. We again reviewed low-salt diet.

## 2018-08-24 ENCOUNTER — HOSPITAL ENCOUNTER (OUTPATIENT)
Dept: LAB | Facility: MEDICAL CENTER | Age: 73
End: 2018-08-24
Attending: INTERNAL MEDICINE
Payer: MEDICARE

## 2018-08-24 DIAGNOSIS — E78.5 HYPERLIPIDEMIA, UNSPECIFIED HYPERLIPIDEMIA TYPE: ICD-10-CM

## 2018-08-24 DIAGNOSIS — I10 ESSENTIAL HYPERTENSION, BENIGN: ICD-10-CM

## 2018-08-24 PROCEDURE — 80061 LIPID PANEL: CPT

## 2018-08-24 PROCEDURE — 80048 BASIC METABOLIC PNL TOTAL CA: CPT

## 2018-08-24 PROCEDURE — 36415 COLL VENOUS BLD VENIPUNCTURE: CPT

## 2018-08-25 LAB
ANION GAP SERPL CALC-SCNC: 4 MMOL/L (ref 0–11.9)
BUN SERPL-MCNC: 12 MG/DL (ref 8–22)
CALCIUM SERPL-MCNC: 9.2 MG/DL (ref 8.5–10.5)
CHLORIDE SERPL-SCNC: 107 MMOL/L (ref 96–112)
CHOLEST SERPL-MCNC: 182 MG/DL (ref 100–199)
CO2 SERPL-SCNC: 28 MMOL/L (ref 20–33)
CREAT SERPL-MCNC: 0.88 MG/DL (ref 0.5–1.4)
GLUCOSE SERPL-MCNC: 84 MG/DL (ref 65–99)
HDLC SERPL-MCNC: 90 MG/DL
LDLC SERPL CALC-MCNC: 79 MG/DL
POTASSIUM SERPL-SCNC: 4.6 MMOL/L (ref 3.6–5.5)
SODIUM SERPL-SCNC: 139 MMOL/L (ref 135–145)
TRIGL SERPL-MCNC: 63 MG/DL (ref 0–149)

## 2018-08-28 ENCOUNTER — OFFICE VISIT (OUTPATIENT)
Dept: MEDICAL GROUP | Facility: MEDICAL CENTER | Age: 73
End: 2018-08-28
Payer: MEDICARE

## 2018-08-28 VITALS
OXYGEN SATURATION: 98 % | HEART RATE: 57 BPM | DIASTOLIC BLOOD PRESSURE: 60 MMHG | WEIGHT: 174 LBS | RESPIRATION RATE: 14 BRPM | HEIGHT: 72 IN | SYSTOLIC BLOOD PRESSURE: 94 MMHG | TEMPERATURE: 98.6 F | BODY MASS INDEX: 23.57 KG/M2

## 2018-08-28 DIAGNOSIS — Z12.11 SCREEN FOR COLON CANCER: ICD-10-CM

## 2018-08-28 DIAGNOSIS — E78.5 HYPERLIPIDEMIA, UNSPECIFIED HYPERLIPIDEMIA TYPE: ICD-10-CM

## 2018-08-28 DIAGNOSIS — I10 ESSENTIAL HYPERTENSION, BENIGN: ICD-10-CM

## 2018-08-28 DIAGNOSIS — Z86.73 HISTORY OF CVA (CEREBROVASCULAR ACCIDENT): ICD-10-CM

## 2018-08-28 DIAGNOSIS — I48.0 PAF (PAROXYSMAL ATRIAL FIBRILLATION) (HCC): ICD-10-CM

## 2018-08-28 PROCEDURE — 99214 OFFICE O/P EST MOD 30 MIN: CPT | Performed by: INTERNAL MEDICINE

## 2018-08-28 NOTE — ASSESSMENT & PLAN NOTE
Paroxysmal atrial fibrillation for which she is anticoagulated with Xarelto.  Heart rhythm today is regular.  He denies palpitations or lightheaded episodes.

## 2018-08-28 NOTE — ASSESSMENT & PLAN NOTE
Hyperlipidemia is treated with atorvastatin.  Cholesterol is 182, triglycerides 63, HDL 90, LDL 79.

## 2018-08-28 NOTE — PROGRESS NOTES
Subjective:   Chief complaint: Hypertension, hyperlipidemia, paroxysmal atrial fibrillation.    Babar Willett is a 73 y.o. male here today for follow-up of his paroxysmal atrial fibrillation, hypertension, hyperlipidemia.    History of CVA (cerebrovascular accident)  Prior CVA, no recent neurologic changes.    PAF (paroxysmal atrial fibrillation)  Paroxysmal atrial fibrillation for which she is anticoagulated with Xarelto.  Heart rhythm today is regular.  He denies palpitations or lightheaded episodes.    Essential hypertension, benign  Hypertension under good control with ramipril rate he denies lightheadedness or headaches.    Hyperlipidemia  Hyperlipidemia is treated with atorvastatin.  Cholesterol is 182, triglycerides 63, HDL 90, LDL 79.    Screen for colon cancer  He is due for repeat stool fit test.  No recent change in bowel habits.       Diagnoses of Essential hypertension, benign, Hyperlipidemia, unspecified hyperlipidemia type, Screen for colon cancer, History of CVA (cerebrovascular accident), and PAF (paroxysmal atrial fibrillation) ([facility]) were pertinent to this visit.    Allergies: Patient has no known allergies.  Current medicines (including changes today)  Current Outpatient Prescriptions   Medication Sig Dispense Refill   • metoprolol SR (TOPROL XL) 50 MG TABLET SR 24 HR Take 50 mg by mouth every day.     • ramipril (ALTACE) 2.5 MG Cap Take 2.5 mg by mouth every day.     • atorvastatin (LIPITOR) 10 MG TABS Take 10 mg by mouth every evening.     • rivaroxaban (XARELTO) 20 MG TABS tablet Take 20 mg by mouth with dinner.       No current facility-administered medications for this visit.        He  has a past medical history of Allergy; History of CVA (cerebrovascular accident) (2/20/2015); Medicare annual wellness visit, subsequent (2/20/2015); PAF (paroxysmal atrial fibrillation) (HCC) (2/20/2015); Stroke ([facility]); and TIA (transient ischemic attack).  Health Maintenance: He was encouraged to get Shingrix  in the near future.  ROS    Patient denies significant change in strength, weight or appetite.  No significant lightheadedness or headaches.  No change in vision, hearing, or swallowing.  No new dyspnea, coughing, chest pain, or palpitations.  No indigestion, abdominal pain, or change in bowel habits.  No change in urinating.  No new ankle swelling.       Objective:     PE:  BP (!) 94/60   Pulse (!) 57   Temp 37 °C (98.6 °F)   Resp 14   Ht 1.829 m (6')   Wt 78.9 kg (174 lb)   SpO2 98%   BMI 23.60 kg/m²    Neck is supple without significant lymphadenopathy or masses.  Lungs are clear with normal breath sounds without wheezes or rales .  Cardiovascular: peripheral circulation is satisfactory, heart sounds are unchanged and unremarkable.  Abdomen is soft, without masses or tenderness, with normal bowel sounds.  Extremities are without significant edema, cyanosis or deformity.      Assessment and Plan:   The following treatment plan was discussed  1. Essential hypertension, benign  BASIC METABOLIC PANEL    Continue ramipril, follow low-salt diet.   2. Hyperlipidemia, unspecified hyperlipidemia type  LIPID PROFILE    Continue atorvastatin.  Follow appropriate diet.   3. Screen for colon cancer  OCCULT BLOOD FECES IMMUNOASSAY    I gave him another stool fit test kit.   4. History of CVA (cerebrovascular accident)      He will go to emergency room if he notes any neurologic changes.   5. PAF (paroxysmal atrial fibrillation) (HCC)      Continue Xarelto.  He will report any significant palpitations.       Followup: Return in about 6 months (around 3/4/2019), or health  wellness, for Long.

## 2018-09-04 ENCOUNTER — HOSPITAL ENCOUNTER (OUTPATIENT)
Facility: MEDICAL CENTER | Age: 73
End: 2018-09-04
Attending: INTERNAL MEDICINE
Payer: MEDICARE

## 2018-09-04 PROCEDURE — 82274 ASSAY TEST FOR BLOOD FECAL: CPT

## 2018-09-08 DIAGNOSIS — Z12.11 SCREEN FOR COLON CANCER: ICD-10-CM

## 2018-09-09 LAB — HEMOCCULT STL QL IA: NEGATIVE

## 2018-09-25 ENCOUNTER — APPOINTMENT (RX ONLY)
Dept: URBAN - METROPOLITAN AREA CLINIC 4 | Facility: CLINIC | Age: 73
Setting detail: DERMATOLOGY
End: 2018-09-25

## 2018-09-25 DIAGNOSIS — L81.4 OTHER MELANIN HYPERPIGMENTATION: ICD-10-CM

## 2018-09-25 DIAGNOSIS — L82.1 OTHER SEBORRHEIC KERATOSIS: ICD-10-CM

## 2018-09-25 DIAGNOSIS — Z85.828 PERSONAL HISTORY OF OTHER MALIGNANT NEOPLASM OF SKIN: ICD-10-CM

## 2018-09-25 DIAGNOSIS — D18.0 HEMANGIOMA: ICD-10-CM

## 2018-09-25 PROBLEM — D48.5 NEOPLASM OF UNCERTAIN BEHAVIOR OF SKIN: Status: ACTIVE | Noted: 2018-09-25

## 2018-09-25 PROBLEM — D18.01 HEMANGIOMA OF SKIN AND SUBCUTANEOUS TISSUE: Status: ACTIVE | Noted: 2018-09-25

## 2018-09-25 PROBLEM — L57.0 ACTINIC KERATOSIS: Status: ACTIVE | Noted: 2018-09-25

## 2018-09-25 PROCEDURE — ? BIOPSY BY SHAVE METHOD

## 2018-09-25 PROCEDURE — 11100: CPT

## 2018-09-25 PROCEDURE — 99213 OFFICE O/P EST LOW 20 MIN: CPT | Mod: 25

## 2018-09-25 ASSESSMENT — LOCATION DETAILED DESCRIPTION DERM
LOCATION DETAILED: RIGHT PROXIMAL POSTERIOR UPPER ARM
LOCATION DETAILED: LEFT DISTAL POSTERIOR UPPER ARM
LOCATION DETAILED: LEFT SUPERIOR MEDIAL UPPER BACK
LOCATION DETAILED: LEFT DISTAL POSTERIOR THIGH
LOCATION DETAILED: INFERIOR THORACIC SPINE
LOCATION DETAILED: LEFT PROXIMAL POSTERIOR UPPER ARM
LOCATION DETAILED: RIGHT DISTAL POSTERIOR UPPER ARM
LOCATION DETAILED: RIGHT DISTAL POSTERIOR THIGH

## 2018-09-25 ASSESSMENT — LOCATION SIMPLE DESCRIPTION DERM
LOCATION SIMPLE: RIGHT POSTERIOR THIGH
LOCATION SIMPLE: LEFT UPPER ARM
LOCATION SIMPLE: UPPER BACK
LOCATION SIMPLE: RIGHT UPPER ARM
LOCATION SIMPLE: LEFT POSTERIOR THIGH
LOCATION SIMPLE: LEFT UPPER BACK

## 2018-09-25 ASSESSMENT — LOCATION ZONE DERM
LOCATION ZONE: ARM
LOCATION ZONE: LEG
LOCATION ZONE: TRUNK

## 2018-09-25 NOTE — PROCEDURE: BIOPSY BY SHAVE METHOD
Type Of Destruction Used: Curettage
Depth Of Biopsy: dermis
Hemostasis: Drysol and Electrocautery
Dressing: Band-Aid
Size Of Lesion In Cm: 0
Was A Bandage Applied: Yes
Electrodesiccation And Curettage Text: The wound bed was treated with electrodesiccation and curettage after the biopsy was performed.
Anesthesia Type: 1% lidocaine with epinephrine and a 1:10 solution of 8.4% sodium bicarbonate
Lab Facility: 
Lab: [id number]
Notification Instructions: Patient will be notified of biopsy results. However, patient instructed to call the office if not contacted within 2 weeks.
Path Notes (To The Dermatopathologist): Mohs if +
Destruction After The Procedure: No
Wound Care: Vaseline
Anesthesia Volume In Cc: 0.5
Consent: Written consent was obtained and risks were reviewed including but not limited to scarring, infection, bleeding, scabbing, incomplete removal, nerve damage and allergy to anesthesia.
Curettage Text: The wound bed was treated with curettage after the biopsy was performed.
Electrodesiccation Text: The wound bed was treated with electrodesiccation after the biopsy was performed.
Detail Level: Detailed
Post-Care Instructions: I reviewed with the patient in detail post-care instructions. Patient is to keep the biopsy site dry overnight, and then apply vasaline twice daily until healed.
Biopsy Method: Personna blade
Biopsy Type: H and E
Billing Type: Third-Party Bill

## 2018-10-03 ENCOUNTER — APPOINTMENT (RX ONLY)
Dept: URBAN - METROPOLITAN AREA CLINIC 4 | Facility: CLINIC | Age: 73
Setting detail: DERMATOLOGY
End: 2018-10-03

## 2018-10-03 PROBLEM — C44.329 SQUAMOUS CELL CARCINOMA OF SKIN OF OTHER PARTS OF FACE: Status: ACTIVE | Noted: 2018-10-03

## 2018-10-03 PROCEDURE — ? MOHS SURGERY PHONE CONSULTATION

## 2018-10-03 NOTE — PROCEDURE: MOHS SURGERY PHONE CONSULTATION
Does The Patient Take Blood Thinners?: Yes
If Yes- Additional Details: Lt knee 2017
Office Location Of Mohs Surgery: [place] [id number]
Has The Patient Ever Been Seen In Our Practice For Mohs Surgery Before?: No
Patient Reported Location: rt sup med malar cheek
Which Antibiotic Do They Take For Surgical Prophylaxis?: Amoxicillin (2 grams)
Additional Information?: pt already has Amoxicillin at home and was directed to take 1 hour prior to appt time.
If Yes- What Blood Thinners Do They Take?: xarelto
If Yes- What Is The Patient's Pharmacy Number?: N/A
Patient Preferred Phone Number: [phone]
If Yes- Details?: stroke 2014
If Yes- Where Is The Will On File?: ask pts wife
Patient's Insurance:  +Matt
Referring Provider: Judy
Detail Level: Detailed
Date Of Mohs Surgery: 10/24/2018
Pathology Accession #: [id number]
Time Of Mohs Surgery: 8:45am

## 2018-10-24 ENCOUNTER — APPOINTMENT (RX ONLY)
Dept: URBAN - METROPOLITAN AREA CLINIC 36 | Facility: CLINIC | Age: 73
Setting detail: DERMATOLOGY
End: 2018-10-24

## 2018-10-24 DIAGNOSIS — L57.8 OTHER SKIN CHANGES DUE TO CHRONIC EXPOSURE TO NONIONIZING RADIATION: ICD-10-CM

## 2018-10-24 PROBLEM — C44.329 SQUAMOUS CELL CARCINOMA OF SKIN OF OTHER PARTS OF FACE: Status: ACTIVE | Noted: 2018-10-24

## 2018-10-24 PROCEDURE — 17311 MOHS 1 STAGE H/N/HF/G: CPT

## 2018-10-24 PROCEDURE — ? MOHS SURGERY

## 2018-10-24 PROCEDURE — 17312 MOHS ADDL STAGE: CPT

## 2018-10-24 PROCEDURE — ? COUNSELING

## 2018-10-24 PROCEDURE — 14040 TIS TRNFR F/C/C/M/N/A/G/H/F: CPT

## 2018-10-24 PROCEDURE — ? PRESCRIPTION

## 2018-10-24 RX ORDER — CEPHALEXIN 500 MG/1
CAPSULE ORAL
Qty: 21 | Refills: 0 | Status: ERX | COMMUNITY
Start: 2018-10-24

## 2018-10-24 RX ADMIN — CEPHALEXIN: 500 CAPSULE ORAL at 00:00

## 2018-10-24 NOTE — PROCEDURE: MOHS SURGERY
Consent (Spinal Accessory)/Introductory Paragraph: The rationale for Mohs was explained to the patient and consent was obtained. The risks, benefits and alternatives to therapy were discussed in detail. Specifically, the risks of damage to the spinal accessory nerve, infection, scarring, bleeding, prolonged wound healing, incomplete removal, allergy to anesthesia, and recurrence were addressed. Prior to the procedure, the treatment site was clearly identified and confirmed by the patient. All components of Universal Protocol/PAUSE Rule completed.
Patient Will Remove Sutures At Home?: No
Show Date Of Previous Biopsy Variable: Yes
V-Y Plasty Text: The defect edges were debeveled with a #15 scalpel blade.  Given the location of the defect, shape of the defect and the proximity to free margins an V-Y advancement flap was deemed most appropriate.  Using a sterile surgical marker, an appropriate advancement flap was drawn incorporating the defect and placing the expected incisions within the relaxed skin tension lines where possible.    The area thus outlined was incised deep to adipose tissue with a #15 scalpel blade.  The skin margins were undermined to an appropriate distance in all directions utilizing iris scissors.
Quadrants Reporting?: 0
Mid-Level Procedure Text (C): After obtaining clear surgical margins the patient was sent to a mid-level provider for surgical repair.  The patient understands they will receive post-surgical care and follow-up from the mid-level provider.
Mohs Case Number: [id number]
Cheiloplasty (Less Than 50%) Text: A decision was made to reconstruct the defect with a  cheiloplasty.  The defect was undermined extensively.  Additional obicularis oris muscle was excised with a 15 blade scalpel.  The defect was converted into a full thickness wedge, of less than 50% of the vertical height of the lip, to facilite a better cosmetic result.  Small vessels were then tied off with 5-0 monocyrl. The obicularis oris, superficial fascia, adipose and dermis were then reapproximated.  After the deeper layers were approximated the epidermis was reapproximated with particular care given to realign the vermilion border.
Otolaryngologist Procedure Text (D): After obtaining clear surgical margins the patient was sent to otolaryngology for surgical repair.  The patient understands they will receive post-surgical care and follow-up from the referring physician's office.
Graft Donor Site Epidermal Sutures (Optional): 5-0 Surgipro
Area M Indication Text: Tumors in this location are included in Area M (cheek, forehead, scalp, neck, jawline and pretibial skin).  Mohs surgery is indicated for tumors in these anatomic locations.
Referring Physician (Optional): Nick Kim M.D
Anesthesia Volume In Cc: 6
Burow's Advancement Flap Text: The defect edges were debeveled with a #15 scalpel blade.  Given the location of the defect and the proximity to free margins a Burow's advancement flap was deemed most appropriate.  Using a sterile surgical marker, the appropriate advancement flap was drawn incorporating the defect and placing the expected incisions within the relaxed skin tension lines where possible.    The area thus outlined was incised deep to adipose tissue with a #15 scalpel blade.  The skin margins were undermined to an appropriate distance in all directions utilizing iris scissors.
Repair Type: Flap
Mastoid Interpolation Flap Text: A decision was made to reconstruct the defect utilizing an interpolation axial flap and a staged reconstruction.  A telfa template was made of the defect.  This telfa template was then used to outline the mastoid interpolation flap.  The donor area for the pedicle flap was then injected with anesthesia.  The flap was excised through the skin and subcutaneous tissue down to the layer of the underlying musculature.  The pedicle flap was carefully excised within this deep plane to maintain its blood supply.  The edges of the donor site were undermined.   The donor site was closed in a primary fashion.  The pedicle was then rotated into position and sutured.  Once the tube was sutured into place, adequate blood supply was confirmed with blanching and refill.  The pedicle was then wrapped with xeroform gauze and dressed appropriately with a telfa and gauze bandage to ensure continued blood supply and protect the attached pedicle.
Rotation Flap Text: The defect edges were debeveled with a #15 scalpel blade.  Given the location of the defect, shape of the defect and the proximity to free margins a rotation flap was deemed most appropriate.  Using a sterile surgical marker, an appropriate rotation flap was drawn incorporating the defect and placing the expected incisions within the relaxed skin tension lines where possible.    The area thus outlined was incised deep to adipose tissue with a #15 scalpel blade.  The skin margins were undermined to an appropriate distance in all directions utilizing iris scissors.
Consent (Marginal Mandibular)/Introductory Paragraph: The rationale for Mohs was explained to the patient and consent was obtained. The risks, benefits and alternatives to therapy were discussed in detail. Specifically, the risks of damage to the marginal mandibular branch of the facial nerve, infection, scarring, bleeding, prolonged wound healing, incomplete removal, allergy to anesthesia, and recurrence were addressed. Prior to the procedure, the treatment site was clearly identified and confirmed by the patient. All components of Universal Protocol/PAUSE Rule completed.
Crescentic Complex Repair Preamble Text (Leave Blank If You Do Not Want): Extensive wide undermining was performed.
V-Y Flap Text: The defect edges were debeveled with a #15 scalpel blade.  Given the location of the defect, shape of the defect and the proximity to free margins a V-Y flap was deemed most appropriate.  Using a sterile surgical marker, an appropriate advancement flap was drawn incorporating the defect and placing the expected incisions within the relaxed skin tension lines where possible.    The area thus outlined was incised deep to adipose tissue with a #15 scalpel blade.  The skin margins were undermined to an appropriate distance in all directions utilizing iris scissors.
Mohs Histo Method Verbiage: Each section was then chromacoded and processed in the Mohs lab using the Mohs protocol and submitted for frozen section.
Inflammation Suggestive Of Cancer Camouflage Histology Text: There was a dense lymphocytic infiltrate which prevented adequate histologic evaluation of adjacent structures.
Stage 2: Number Of Blocks?: 1
Composite Graft Text: The defect edges were debeveled with a #15 scalpel blade.  Given the location of the defect, shape of the defect, the proximity to free margins and the fact the defect was full thickness a composite graft was deemed most appropriate.  The defect was outline and then transferred to the donor site.  A full thickness graft was then excised from the donor site. The graft was then placed in the primary defect, oriented appropriately and then sutured into place.  The secondary defect was then repaired using a primary closure.
Plastic Surgeon Procedure Text (F): After obtaining clear surgical margins the patient was sent to plastics for surgical repair.  The patient understands they will receive post-surgical care and follow-up from the referring physician's office.
Consent (Scalp)/Introductory Paragraph: The rationale for Mohs was explained to the patient and consent was obtained. The risks, benefits and alternatives to therapy were discussed in detail. Specifically, the risks of changes in hair growth pattern secondary to repair, infection, scarring, bleeding, prolonged wound healing, incomplete removal, allergy to anesthesia, nerve injury and recurrence were addressed. Prior to the procedure, the treatment site was clearly identified and confirmed by the patient. All components of Universal Protocol/PAUSE Rule completed.
Closure 2 Information: This tab is for additional flaps and grafts, including complex repair and grafts and complex repair and flaps. You can also specify a different location for the additional defect, if the location is the same you do not need to select a new one. We will insert the automated text for the repair you select below just as we do for solitary flaps and grafts. Please note that at this time if you select a location with a different insurance zone you will need to override the ICD10 and CPT if appropriate.
Epidermal Sutures: 6-0 Surgipro
Asc Procedure Text (F): After obtaining clear surgical margins the patient was sent to an ASC for surgical repair.  The patient understands they will receive post-surgical care and follow-up from the ASC physician.
Repair Anesthesia Type: 1% lidocaine with epinephrine
Unna Boot Text: An Unna boot was placed to help immobilize the limb and facilitate more rapid healing.
Location Indication Override (Is Already Calculated Based On Selected Body Location): Area H
Surgical Defect Length In Cm (Optional): 0.8
Oculoplastic Surgeon Procedure Text (D): After obtaining clear surgical margins the patient was sent to oculoplastics for surgical repair.  The patient understands they will receive post-surgical care and follow-up from the referring physician's office.
Consent (Lip)/Introductory Paragraph: The rationale for Mohs was explained to the patient and consent was obtained. The risks, benefits and alternatives to therapy were discussed in detail. Specifically, the risks of lip deformity, changes in the oral aperture, infection, scarring, bleeding, prolonged wound healing, incomplete removal, allergy to anesthesia, nerve injury and recurrence were addressed. Prior to the procedure, the treatment site was clearly identified and confirmed by the patient. All components of Universal Protocol/PAUSE Rule completed.
Medical Necessity Statement: Based on my medical judgement, Mohs surgery is the most appropriate treatment for this cancer compared to other treatments.
Provider Procedure Text (E): After obtaining clear surgical margins the defect was repaired by another provider.
Hemostasis: Electrocautery
Tarsorrhaphy Text: A tarsorrhaphy was performed using Frost sutures.
Eye Protection Verbiage: Before proceeding with the stage, a plastic scleral shield was inserted. The globe was anesthetized with a few drops of 1% lidocaine with 1:100,000 epinephrine. Then, an appropriate sized scleral shield was chosen and coated with lacrilube ointment. The shield was gently inserted and left in place for the duration of each stage. After the stage was completed, the shield was gently removed.
Bcc Histology Text: There were numerous aggregates of basaloid cells.
O-T Plasty Text: The defect edges were debeveled with a #15 scalpel blade.  Given the location of the defect, shape of the defect and the proximity to free margins an O-T plasty was deemed most appropriate.  Using a sterile surgical marker, an appropriate O-T plasty was drawn incorporating the defect and placing the expected incisions within the relaxed skin tension lines where possible.    The area thus outlined was incised deep to adipose tissue with a #15 scalpel blade.  The skin margins were undermined to an appropriate distance in all directions utilizing iris scissors.
Skin Substitute Text: The defect edges were debeveled with a #15 scalpel blade.  Given the location of the defect, shape of the defect and the proximity to free margins a skin substitute graft was deemed most appropriate.  The graft material was trimmed to fit the size of the defect. The graft was then placed in the primary defect and oriented appropriately.
Dorsal Nasal Flap Text: The defect edges were debeveled with a #15 scalpel blade.  Given the location of the defect and the proximity to free margins a dorsal nasal flap was deemed most appropriate.  Using a sterile surgical marker, an appropriate dorsal nasal flap was drawn around the defect.    The area thus outlined was incised deep to adipose tissue with a #15 scalpel blade.  The skin margins were undermined to an appropriate distance in all directions utilizing iris scissors.
Manual Repair Warning Statement: We plan on removing the manually selected variable below in favor of our much easier automatic structured text blocks found in the previous tab. We decided to do this to help make the flow better and give you the full power of structured data. Manual selection is never going to be ideal in our platform and I would encourage you to avoid using manual selection from this point on, especially since I will be sunsetting this feature. It is important that you do one of two things with the customized text below. First, you can save all of the text in a word file so you can have it for future reference. Second, transfer the text to the appropriate area in the Library tab. Lastly, if there is a flap or graft type which we do not have you need to let us know right away so I can add it in before the variable is hidden. No need to panic, we plan to give you roughly 6 months to make the change.
Number Of Stages: 2
Mohs Rapid Report Verbiage: The area of clinically evident tumor was marked with skin marking ink and appropriately hatched.  The initial incision was made following the Mohs approach through the skin.  The specimen was taken to the lab, divided into the necessary number of pieces, chromacoded and processed according to the Mohs protocol.  This was repeated in successive stages until a tumor free defect was achieved.
Melolabial Transposition Flap Text: The defect edges were debeveled with a #15 scalpel blade.  Given the location of the defect and the proximity to free margins a melolabial flap was deemed most appropriate.  Using a sterile surgical marker, an appropriate melolabial transposition flap was drawn incorporating the defect.    The area thus outlined was incised deep to adipose tissue with a #15 scalpel blade.  The skin margins were undermined to an appropriate distance in all directions utilizing iris scissors.
Complex Repair And Graft Additional Text (Will Appearing After The Standard Complex Repair Text): The complex repair was not sufficient to completely close the primary defect. The remaining additional defect was repaired with the graft mentioned below.
Secondary Intention Text (Leave Blank If You Do Not Want): The defect will heal with secondary intention.
Suture Removal: 7 days
Banner Transposition Flap Text: The defect edges were debeveled with a #15 scalpel blade.  Given the location of the defect and the proximity to free margins a Banner transposition flap was deemed most appropriate.  Using a sterile surgical marker, an appropriate flap drawn around the defect. The area thus outlined was incised deep to adipose tissue with a #15 scalpel blade.  The skin margins were undermined to an appropriate distance in all directions utilizing iris scissors.
Epidermal Closure: running cuticular
Wound Care: Aquaphor
Surgeon Performing Repair (Optional): Luci Chadwick MD
X Size Of Lesion In Cm (Optional): 0.9
Alar Island Pedicle Flap Text: The defect edges were debeveled with a #15 scalpel blade.  Given the location of the defect, shape of the defect and the proximity to the alar rim an island pedicle advancement flap was deemed most appropriate.  Using a sterile surgical marker, an appropriate advancement flap was drawn incorporating the defect, outlining the appropriate donor tissue and placing the expected incisions within the nasal ala running parallel to the alar rim. The area thus outlined was incised with a #15 scalpel blade.  The skin margins were undermined minimally to an appropriate distance in all directions around the primary defect and laterally outward around the island pedicle utilizing iris scissors.  There was minimal undermining beneath the pedicle flap.
No Repair - Repaired With Adjacent Surgical Defect Text (Leave Blank If You Do Not Want): After obtaining clear surgical margins the defect was repaired concurrently with another surgical defect which was in close approximation.
Complicating Clinical Features Indication Override: SCC/ SCC in situ
O-L Flap Text: The defect edges were debeveled with a #15 scalpel blade.  Given the location of the defect, shape of the defect and the proximity to free margins an O-L flap was deemed most appropriate.  Using a sterile surgical marker, an appropriate advancement flap was drawn incorporating the defect and placing the expected incisions within the relaxed skin tension lines where possible.    The area thus outlined was incised deep to adipose tissue with a #15 scalpel blade.  The skin margins were undermined to an appropriate distance in all directions utilizing iris scissors.
Epidermal Autograft Text: The defect edges were debeveled with a #15 scalpel blade.  Given the location of the defect, shape of the defect and the proximity to free margins an epidermal autograft was deemed most appropriate.  Using a sterile surgical marker, the primary defect shape was transferred to the donor site. The epidermal graft was then harvested.  The skin graft was then placed in the primary defect and oriented appropriately.
Cheek Interpolation Flap Text: A decision was made to reconstruct the defect utilizing an interpolation axial flap and a staged reconstruction.  A telfa template was made of the defect.  This telfa template was then used to outline the Cheek Interpolation flap.  The donor area for the pedicle flap was then injected with anesthesia.  The flap was excised through the skin and subcutaneous tissue down to the layer of the underlying musculature.  The interpolation flap was carefully excised within this deep plane to maintain its blood supply.  The edges of the donor site were undermined.   The donor site was closed in a primary fashion.  The pedicle was then rotated into position and sutured.  Once the tube was sutured into place, adequate blood supply was confirmed with blanching and refill.  The pedicle was then wrapped with xeroform gauze and dressed appropriately with a telfa and gauze bandage to ensure continued blood supply and protect the attached pedicle.
Double Island Pedicle Flap Text: The defect edges were debeveled with a #15 scalpel blade.  Given the location of the defect, shape of the defect and the proximity to free margins a double island pedicle advancement flap was deemed most appropriate.  Using a sterile surgical marker, an appropriate advancement flap was drawn incorporating the defect, outlining the appropriate donor tissue and placing the expected incisions within the relaxed skin tension lines where possible.    The area thus outlined was incised deep to adipose tissue with a #15 scalpel blade.  The skin margins were undermined to an appropriate distance in all directions around the primary defect and laterally outward around the island pedicle utilizing iris scissors.  There was minimal undermining beneath the pedicle flap.
Mauc Instructions: By selecting yes to the question below the MAUC number will be added into the note.  This will be calculated automatically based on the diagnosis chosen, the size entered, the body zone selected (H,M,L) and the specific indications you chose. You will also have the option to override the Mohs AUC if you disagree with the automatically calculated number and this option is found in the Case Summary tab.
Ear Star Wedge Flap Text: The defect edges were debeveled with a #15 blade scalpel.  Given the location of the defect and the proximity to free margins (helical rim) an ear star wedge flap was deemed most appropriate.  Using a sterile surgical marker, the appropriate flap was drawn incorporating the defect and placing the expected incisions between the helical rim and antihelix where possible.  The area thus outlined was incised through and through with a #15 scalpel blade.
Graft Donor Site Bandage (Optional-Leave Blank If You Don't Want In Note): Aquaplast was fitted to the graft site and sewn into place. A pressure bandage were applied to the donor site and over the aquaplast bolster.
Closure 4 Information: This tab is for additional flaps and grafts above and beyond our usual structured repairs.  Please note if you enter information here it will not currently bill and you will need to add the billing information manually.
Partial Purse String (Simple) Text: Given the location of the defect and the characteristics of the surrounding skin a simple purse string closure was deemed most appropriate.  Undermining was performed circumfirentially around the surgical defect.  A purse string suture was then placed and tightened. Wound tension only allowed a partial closure of the circular defect.
Consent (Near Eyelid Margin)/Introductory Paragraph: The rationale for Mohs was explained to the patient and consent was obtained. The risks, benefits and alternatives to therapy were discussed in detail. Specifically, the risks of ectropion or eyelid deformity, infection, scarring, bleeding, prolonged wound healing, incomplete removal, allergy to anesthesia, nerve injury and recurrence were addressed. Prior to the procedure, the treatment site was clearly identified and confirmed by the patient. All components of Universal Protocol/PAUSE Rule completed.
Estimated Blood Loss (Cc): minimal
Muscle Hinge Flap Text: The defect edges were debeveled with a #15 scalpel blade.  Given the size, depth and location of the defect and the proximity to free margins a muscle hinge flap was deemed most appropriate.  Using a sterile surgical marker, an appropriate hinge flap was drawn incorporating the defect. The area thus outlined was incised with a #15 scalpel blade.  The skin margins were undermined to an appropriate distance in all directions utilizing iris scissors.
Full Thickness Lip Wedge Repair (Flap) Text: Given the location of the defect and the proximity to free margins a full thickness wedge repair was deemed most appropriate.  Using a sterile surgical marker, the appropriate repair was drawn incorporating the defect and placing the expected incisions perpendicular to the vermilion border.  The vermilion border was also meticulously outlined to ensure appropriate reapproximation during the repair.  The area thus outlined was incised through and through with a #15 scalpel blade.  The muscularis and dermis were reaproximated with deep sutures following hemostasis. Care was taken to realign the vermilion border before proceeding with the superficial closure.  Once the vermilion was realigned the superfical and mucosal closure was finished.
Deep Sutures: 5-0 Maxon
Posterior Auricular Interpolation Flap Text: A decision was made to reconstruct the defect utilizing an interpolation axial flap and a staged reconstruction.  A telfa template was made of the defect.  This telfa template was then used to outline the posterior auricular interpolation flap.  The donor area for the pedicle flap was then injected with anesthesia.  The flap was excised through the skin and subcutaneous tissue down to the layer of the underlying musculature.  The pedicle flap was carefully excised within this deep plane to maintain its blood supply.  The edges of the donor site were undermined.   The donor site was closed in a primary fashion.  The pedicle was then rotated into position and sutured.  Once the tube was sutured into place, adequate blood supply was confirmed with blanching and refill.  The pedicle was then wrapped with xeroform gauze and dressed appropriately with a telfa and gauze bandage to ensure continued blood supply and protect the attached pedicle.
Consent (Temporal Branch)/Introductory Paragraph: The rationale for Mohs was explained to the patient and consent was obtained. The risks, benefits and alternatives to therapy were discussed in detail. Specifically, the risks of damage to the temporal branch of the facial nerve, infection, scarring, bleeding, prolonged wound healing, incomplete removal, allergy to anesthesia, and recurrence were addressed. Prior to the procedure, the treatment site was clearly identified and confirmed by the patient. All components of Universal Protocol/PAUSE Rule completed.
Paramedian Forehead Flap Text: A decision was made to reconstruct the defect utilizing an interpolation axial flap and a staged reconstruction.  A telfa template was made of the defect.  This telfa template was then used to outline the paramedian forehead pedicle flap.  The donor area for the pedicle flap was then injected with anesthesia.  The flap was excised through the skin and subcutaneous tissue down to the layer of the underlying musculature.  The pedicle flap was carefully excised within this deep plane to maintain its blood supply.  The edges of the donor site were undermined.   The donor site was closed in a primary fashion.  The pedicle was then rotated into position and sutured.  Once the tube was sutured into place, adequate blood supply was confirmed with blanching and refill.  The pedicle was then wrapped with xeroform gauze and dressed appropriately with a telfa and gauze bandage to ensure continued blood supply and protect the attached pedicle.
Intermediate Repair Preamble Text (Leave Blank If You Do Not Want): Undermining was performed with blunt dissection.
Ftsg Text: The defect edges were debeveled with a #15 scalpel blade.  Given the location of the defect, shape of the defect and the proximity to free margins a full thickness skin graft was deemed most appropriate.  Using a sterile surgical marker, the primary defect shape was transferred to the donor site. The area thus outlined was incised deep to adipose tissue with a #15 scalpel blade.  The harvested graft was then trimmed of adipose tissue until only dermis and epidermis was left.  The skin margins of the secondary defect were undermined to an appropriate distance in all directions utilizing iris scissors.  The secondary defect was closed with interrupted buried subcutaneous sutures.  The skin edges were then re-apposed with running  sutures.  The skin graft was then placed in the primary defect and oriented appropriately.
Area H Indication Text: Tumors in this location are included in Area H (eyelids, eyebrows, nose, lips, chin, ear, pre-auricular, post-auricular, temple, genitalia, hands, feet, ankles and areola).  Tissue conservation is critical in these anatomic locations.
Bilateral Helical Rim Advancement Flap Text: The defect edges were debeveled with a #15 blade scalpel.  Given the location of the defect and the proximity to free margins (helical rim) a bilateral helical rim advancement flap was deemed most appropriate.  Using a sterile surgical marker, the appropriate advancement flaps were drawn incorporating the defect and placing the expected incisions between the helical rim and antihelix where possible.  The area thus outlined was incised through and through with a #15 scalpel blade.  With a skin hook and iris scissors, the flaps were gently and sharply undermined and freed up.
Secondary Defect Width In Cm (Required For Flaps): 1.3
Z Plasty Text: The lesion was extirpated to the level of the fat with a #15 scalpel blade.  Given the location of the defect, shape of the defect and the proximity to free margins a Z-plasty was deemed most appropriate for repair.  Using a sterile surgical marker, the appropriate transposition arms of the Z-plasty were drawn incorporating the defect and placing the expected incisions within the relaxed skin tension lines where possible.    The area thus outlined was incised deep to adipose tissue with a #15 scalpel blade.  The skin margins were undermined to an appropriate distance in all directions utilizing iris scissors.  The opposing transposition arms were then transposed into place in opposite direction and anchored with interrupted buried subcutaneous sutures.
Purse String (Intermediate) Text: Given the location of the defect and the characteristics of the surrounding skin a purse string intermediate closure was deemed most appropriate.  Undermining was performed circumfirentially around the surgical defect.  A purse string suture was then placed and tightened.
Complex Repair And Flap Additional Text (Will Appearing After The Standard Complex Repair Text): The complex repair was not sufficient to completely close the primary defect. The remaining additional defect was repaired with the flap mentioned below.
Alternatives Discussed Intro (Do Not Add Period): I discussed alternative treatments to Mohs surgery and specifically discussed the risks and benefits of
Advancement Flap (Single) Text: The defect edges were debeveled with a #15 scalpel blade.  Given the location of the defect and the proximity to free margins a single advancement flap was deemed most appropriate.  Using a sterile surgical marker, an appropriate advancement flap was drawn incorporating the defect and placing the expected incisions within the relaxed skin tension lines where possible.    The area thus outlined was incised deep to adipose tissue with a #15 scalpel blade.  The skin margins were undermined to an appropriate distance in all directions utilizing iris scissors.
Trilobed Flap Text: The defect edges were debeveled with a #15 scalpel blade.  Given the location of the defect and the proximity to free margins a trilobed flap was deemed most appropriate.  Using a sterile surgical marker, an appropriate trilobed flap drawn around the defect.    The area thus outlined was incised deep to adipose tissue with a #15 scalpel blade.  The skin margins were undermined to an appropriate distance in all directions utilizing iris scissors.
Bcc Infiltrative Histology Text: There were numerous aggregates of basaloid cells demonstrating an infiltrative pattern.
Spiral Flap Text: The defect edges were debeveled with a #15 scalpel blade.  Given the location of the defect, shape of the defect and the proximity to free margins a spiral flap was deemed most appropriate.  Using a sterile surgical marker, an appropriate rotation flap was drawn incorporating the defect and placing the expected incisions within the relaxed skin tension lines where possible. The area thus outlined was incised deep to adipose tissue with a #15 scalpel blade.  The skin margins were undermined to an appropriate distance in all directions utilizing iris scissors.
Cheek-To-Nose Interpolation Flap Text: A decision was made to reconstruct the defect utilizing an interpolation axial flap and a staged reconstruction.  A telfa template was made of the defect.  This telfa template was then used to outline the Cheek-To-Nose Interpolation flap.  The donor area for the pedicle flap was then injected with anesthesia.  The flap was excised through the skin and subcutaneous tissue down to the layer of the underlying musculature.  The interpolation flap was carefully excised within this deep plane to maintain its blood supply.  The edges of the donor site were undermined.   The donor site was closed in a primary fashion.  The pedicle was then rotated into position and sutured.  Once the tube was sutured into place, adequate blood supply was confirmed with blanching and refill.  The pedicle was then wrapped with xeroform gauze and dressed appropriately with a telfa and gauze bandage to ensure continued blood supply and protect the attached pedicle.
Crescentic Advancement Flap Text: The defect edges were debeveled with a #15 scalpel blade.  Given the location of the defect and the proximity to free margins a crescentic advancement flap was deemed most appropriate.  Using a sterile surgical marker, the appropriate advancement flap was drawn incorporating the defect and placing the expected incisions within the relaxed skin tension lines where possible.    The area thus outlined was incised deep to adipose tissue with a #15 scalpel blade.  The skin margins were undermined to an appropriate distance in all directions utilizing iris scissors.
O-Z Plasty Text: The defect edges were debeveled with a #15 scalpel blade.  Given the location of the defect, shape of the defect and the proximity to free margins an O-Z plasty (double transposition flap) was deemed most appropriate.  Using a sterile surgical marker, the appropriate transposition flaps were drawn incorporating the defect and placing the expected incisions within the relaxed skin tension lines where possible.    The area thus outlined was incised deep to adipose tissue with a #15 scalpel blade.  The skin margins were undermined to an appropriate distance in all directions utilizing iris scissors.  Hemostasis was achieved with electrocautery.  The flaps were then transposed into place, one clockwise and the other counterclockwise, and anchored with interrupted buried subcutaneous sutures.
Bi-Rhombic Flap Text: The defect edges were debeveled with a #15 scalpel blade.  Given the location of the defect and the proximity to free margins a bi-rhombic flap was deemed most appropriate.  Using a sterile surgical marker, an appropriate rhombic flap was drawn incorporating the defect. The area thus outlined was incised deep to adipose tissue with a #15 scalpel blade.  The skin margins were undermined to an appropriate distance in all directions utilizing iris scissors.
Modified Advancement Flap Text: The defect edges were debeveled with a #15 scalpel blade.  Given the location of the defect, shape of the defect and the proximity to free margins a modified advancement flap was deemed most appropriate.  Using a sterile surgical marker, an appropriate advancement flap was drawn incorporating the defect and placing the expected incisions within the relaxed skin tension lines where possible.    The area thus outlined was incised deep to adipose tissue with a #15 scalpel blade.  The skin margins were undermined to an appropriate distance in all directions utilizing iris scissors.
Consent (Ear)/Introductory Paragraph: The rationale for Mohs was explained to the patient and consent was obtained. The risks, benefits and alternatives to therapy were discussed in detail. Specifically, the risks of ear deformity, infection, scarring, bleeding, prolonged wound healing, incomplete removal, allergy to anesthesia, nerve injury and recurrence were addressed. Prior to the procedure, the treatment site was clearly identified and confirmed by the patient. All components of Universal Protocol/PAUSE Rule completed.
Ear Wedge Repair Text: A wedge excision was completed by carrying down an excision through the full thickness of the ear and cartilage with an inward facing Burow's triangle. The wound was then closed in a layered fashion.
Dressing (No Sutures): dry sterile dressing
Dermal Autograft Text: The defect edges were debeveled with a #15 scalpel blade.  Given the location of the defect, shape of the defect and the proximity to free margins a dermal autograft was deemed most appropriate.  Using a sterile surgical marker, the primary defect shape was transferred to the donor site. The area thus outlined was incised deep to adipose tissue with a #15 scalpel blade.  The harvested graft was then trimmed of adipose and epidermal tissue until only dermis was left.  The skin graft was then placed in the primary defect and oriented appropriately.
Wound Care (No Sutures): Petrolatum
Island Pedicle Flap-Requiring Vessel Identification Text: The defect edges were debeveled with a #15 scalpel blade.  Given the location of the defect, shape of the defect and the proximity to free margins an island pedicle advancement flap was deemed most appropriate.  Using a sterile surgical marker, an appropriate advancement flap was drawn, based on the axial vessel mentioned above, incorporating the defect, outlining the appropriate donor tissue and placing the expected incisions within the relaxed skin tension lines where possible.    The area thus outlined was incised deep to adipose tissue with a #15 scalpel blade.  The skin margins were undermined to an appropriate distance in all directions around the primary defect and laterally outward around the island pedicle utilizing iris scissors.  There was minimal undermining beneath the pedicle flap.
Partial Purse String (Intermediate) Text: Given the location of the defect and the characteristics of the surrounding skin an intermediate purse string closure was deemed most appropriate.  Undermining was performed circumfirentially around the surgical defect.  A purse string suture was then placed and tightened. Wound tension only allowed a partial closure of the circular defect.
Mucosal Advancement Flap Text: Given the location of the defect, shape of the defect and the proximity to free margins a mucosal advancement flap was deemed most appropriate. Incisions were made with a 15 blade scalpel in the appropriate fashion along the cutaneous vermilion border and the mucosal lip. The remaining actinically damaged mucosal tissue was excised.  The mucosal advancement flap was then elevated to the gingival sulcus with care taken to preserve the neurovascular structures and advanced into the primary defect. Care was taken to ensure that precise realignment of the vermilion border was achieved.
Transposition Flap Text: The defect edges were debeveled with a #15 scalpel blade.  Given the location of the defect and the proximity to free margins a transposition flap was deemed most appropriate.  Using a sterile surgical marker, an appropriate transposition flap was drawn incorporating the defect.    The area thus outlined was incised deep to adipose tissue with a #15 scalpel blade.  The skin margins were undermined to an appropriate distance in all directions utilizing iris scissors.
Consent 3/Introductory Paragraph: I gave the patient a chance to ask questions they had about the procedure.  Following this I explained the Mohs procedure and consent was obtained. The risks, benefits and alternatives to therapy were discussed in detail. Specifically, the risks of infection, scarring, bleeding, prolonged wound healing, incomplete removal, allergy to anesthesia, nerve injury and recurrence were addressed. Prior to the procedure, the treatment site was clearly identified and confirmed by the patient. All components of Universal Protocol/PAUSE Rule completed.
Same Histology In Subsequent Stages Text: The pattern and morphology of the tumor is as described in the first stage.
Island Pedicle Flap With Canthal Suspension Text: The defect edges were debeveled with a #15 scalpel blade.  Given the location of the defect, shape of the defect and the proximity to free margins an island pedicle advancement flap was deemed most appropriate.  Using a sterile surgical marker, an appropriate advancement flap was drawn incorporating the defect, outlining the appropriate donor tissue and placing the expected incisions within the relaxed skin tension lines where possible. The area thus outlined was incised deep to adipose tissue with a #15 scalpel blade.  The skin margins were undermined to an appropriate distance in all directions around the primary defect and laterally outward around the island pedicle utilizing iris scissors.  There was minimal undermining beneath the pedicle flap. A suspension suture was placed in the canthal tendon to prevent tension and prevent ectropion.
Consent 2/Introductory Paragraph: Mohs surgery was explained to the patient and consent was obtained. The risks, benefits and alternatives to therapy were discussed in detail. Specifically, the risks of infection, scarring, bleeding, prolonged wound healing, incomplete removal, allergy to anesthesia, nerve injury and recurrence were addressed. Prior to the procedure, the treatment site was clearly identified and confirmed by the patient. All components of Universal Protocol/PAUSE Rule completed.
Split-Thickness Skin Graft Text: The defect edges were debeveled with a #15 scalpel blade.  Given the location of the defect, shape of the defect and the proximity to free margins a split thickness skin graft was deemed most appropriate.  Using a sterile surgical marker, the primary defect shape was transferred to the donor site. The split thickness graft was then harvested.  The skin graft was then placed in the primary defect and oriented appropriately.
Secondary Defect Length In Cm (Required For Flaps): 1.1
W Plasty Text: The lesion was extirpated to the level of the fat with a #15 scalpel blade.  Given the location of the defect, shape of the defect and the proximity to free margins a W-plasty was deemed most appropriate for repair.  Using a sterile surgical marker, the appropriate transposition arms of the W-plasty were drawn incorporating the defect and placing the expected incisions within the relaxed skin tension lines where possible.    The area thus outlined was incised deep to adipose tissue with a #15 scalpel blade.  The skin margins were undermined to an appropriate distance in all directions utilizing iris scissors.  The opposing transposition arms were then transposed into place in opposite direction and anchored with interrupted buried subcutaneous sutures.
Non-Graft Cartilage Fenestration Text: The cartilage was fenestrated with a 2mm punch biopsy to help facilitate healing.
Wound Check: 6 weeks
Star Wedge Flap Text: The defect edges were debeveled with a #15 scalpel blade.  Given the location of the defect, shape of the defect and the proximity to free margins a star wedge flap was deemed most appropriate.  Using a sterile surgical marker, an appropriate rotation flap was drawn incorporating the defect and placing the expected incisions within the relaxed skin tension lines where possible. The area thus outlined was incised deep to adipose tissue with a #15 scalpel blade.  The skin margins were undermined to an appropriate distance in all directions utilizing iris scissors.
A-T Advancement Flap Text: The defect edges were debeveled with a #15 scalpel blade.  Given the location of the defect, shape of the defect and the proximity to free margins an A-T advancement flap was deemed most appropriate.  Using a sterile surgical marker, an appropriate advancement flap was drawn incorporating the defect and placing the expected incisions within the relaxed skin tension lines where possible.    The area thus outlined was incised deep to adipose tissue with a #15 scalpel blade.  The skin margins were undermined to an appropriate distance in all directions utilizing iris scissors.
Bilobed Transposition Flap Text: The defect edges were debeveled with a #15 scalpel blade.  Given the location of the defect and the proximity to free margins a bilobed transposition flap was deemed most appropriate.  Using a sterile surgical marker, an appropriate bilobe flap drawn around the defect.    The area thus outlined was incised deep to adipose tissue with a #15 scalpel blade.  The skin margins were undermined to an appropriate distance in all directions utilizing iris scissors.
Interpolation Flap Text: A decision was made to reconstruct the defect utilizing an interpolation axial flap and a staged reconstruction.  A telfa template was made of the defect.  This telfa template was then used to outline the interpolation flap.  The donor area for the pedicle flap was then injected with anesthesia.  The flap was excised through the skin and subcutaneous tissue down to the layer of the underlying musculature.  The interpolation flap was carefully excised within this deep plane to maintain its blood supply.  The edges of the donor site were undermined.   The donor site was closed in a primary fashion.  The pedicle was then rotated into position and sutured.  Once the tube was sutured into place, adequate blood supply was confirmed with blanching and refill.  The pedicle was then wrapped with xeroform gauze and dressed appropriately with a telfa and gauze bandage to ensure continued blood supply and protect the attached pedicle.
S Plasty Text: Given the location and shape of the defect, and the orientation of relaxed skin tension lines, an S-plasty was deemed most appropriate for repair.  Using a sterile surgical marker, the appropriate outline of the S-plasty was drawn, incorporating the defect and placing the expected incisions within the relaxed skin tension lines where possible.  The area thus outlined was incised deep to adipose tissue with a #15 scalpel blade.  The skin margins were undermined to an appropriate distance in all directions utilizing iris scissors. The skin flaps were advanced over the defect.  The opposing margins were then approximated with interrupted buried subcutaneous sutures.
Purse String (Simple) Text: Given the location of the defect and the characteristics of the surrounding skin a purse string closure was deemed most appropriate.  Undermining was performed circumfirentially around the surgical defect.  A purse string suture was then placed and tightened.
Area L Indication Text: Tumors in this location are included in Area L (trunk and extremities).  Mohs surgery is indicated for larger tumors, or tumors with aggressive histologic features, in these anatomic locations.
Post-Care Instructions: I reviewed with the patient in detail post-care instructions. Patient is not to engage in any heavy lifting, exercise, or swimming for the next 14 days. Should the patient develop any fevers, chills, bleeding, severe pain patient will contact the office immediately.
Surgeon/Pathologist Verbiage (Will Incorporate Name Of Surgeon From Intro If Not Blank): operated in two distinct and integrated capacities as the surgeon and pathologist.
No Residual Tumor Seen Histology Text: There were no malignant cells seen in the sections examined.
Consent Type: Consent 1 (Standard)
Flap Type: Advancement Flap (Single)
Consent 1/Introductory Paragraph: The rationale for Mohs was explained to the patient and consent was obtained. The risks, benefits and alternatives to therapy were discussed in detail. Specifically, the risks of infection, scarring, bleeding, prolonged wound healing, incomplete removal, allergy to anesthesia, nerve injury and recurrence were addressed. Prior to the procedure, the treatment site was clearly identified and confirmed by the patient. All components of Universal Protocol/PAUSE Rule completed.
Donor Site Anesthesia Type: same as repair anesthesia
Postop Diagnosis: same
Rhombic Flap Text: The defect edges were debeveled with a #15 scalpel blade.  Given the location of the defect and the proximity to free margins a rhombic flap was deemed most appropriate.  Using a sterile surgical marker, an appropriate rhombic flap was drawn incorporating the defect.    The area thus outlined was incised deep to adipose tissue with a #15 scalpel blade.  The skin margins were undermined to an appropriate distance in all directions utilizing iris scissors.
Cheiloplasty (Complex) Text: A decision was made to reconstruct the defect with a  cheiloplasty.  The defect was undermined extensively.  Additional obicularis oris muscle was excised with a 15 blade scalpel.  The defect was converted into a full thickness wedge to facilite a better cosmetic result.  Small vessels were then tied off with 5-0 monocyrl. The obicularis oris, superficial fascia, adipose and dermis were then reapproximated.  After the deeper layers were approximated the epidermis was reapproximated with particular care given to realign the vermilion border.
Mercedes Flap Text: The defect edges were debeveled with a #15 scalpel blade.  Given the location of the defect, shape of the defect and the proximity to free margins a Mercedes flap was deemed most appropriate.  Using a sterile surgical marker, an appropriate advancement flap was drawn incorporating the defect and placing the expected incisions within the relaxed skin tension lines where possible. The area thus outlined was incised deep to adipose tissue with a #15 scalpel blade.  The skin margins were undermined to an appropriate distance in all directions utilizing iris scissors.
Helical Rim Advancement Flap Text: The defect edges were debeveled with a #15 blade scalpel.  Given the location of the defect and the proximity to free margins (helical rim) a double helical rim advancement flap was deemed most appropriate.  Using a sterile surgical marker, the appropriate advancement flaps were drawn incorporating the defect and placing the expected incisions between the helical rim and antihelix where possible.  The area thus outlined was incised through and through with a #15 scalpel blade.  With a skin hook and iris scissors, the flaps were gently and sharply undermined and freed up.
Detail Level: Detailed
Dressing: pressure dressing with telfa
Advancement-Rotation Flap Text: The defect edges were debeveled with a #15 scalpel blade.  Given the location of the defect, shape of the defect and the proximity to free margins an advancement-rotation flap was deemed most appropriate.  Using a sterile surgical marker, an appropriate flap was drawn incorporating the defect and placing the expected incisions within the relaxed skin tension lines where possible. The area thus outlined was incised deep to adipose tissue with a #15 scalpel blade.  The skin margins were undermined to an appropriate distance in all directions utilizing iris scissors.
Keystone Flap Text: The defect edges were debeveled with a #15 scalpel blade.  Given the location of the defect, shape of the defect a keystone flap was deemed most appropriate.  Using a sterile surgical marker, an appropriate keystone flap was drawn incorporating the defect, outlining the appropriate donor tissue and placing the expected incisions within the relaxed skin tension lines where possible. The area thus outlined was incised deep to adipose tissue with a #15 scalpel blade.  The skin margins were undermined to an appropriate distance in all directions around the primary defect and laterally outward around the flap utilizing iris scissors.
Hatchet Flap Text: The defect edges were debeveled with a #15 scalpel blade.  Given the location of the defect, shape of the defect and the proximity to free margins a hatchet flap was deemed most appropriate.  Using a sterile surgical marker, an appropriate hatchet flap was drawn incorporating the defect and placing the expected incisions within the relaxed skin tension lines where possible.    The area thus outlined was incised deep to adipose tissue with a #15 scalpel blade.  The skin margins were undermined to an appropriate distance in all directions utilizing iris scissors.
Melolabial Interpolation Flap Text: A decision was made to reconstruct the defect utilizing an interpolation axial flap and a staged reconstruction.  A telfa template was made of the defect.  This telfa template was then used to outline the melolabial interpolation flap.  The donor area for the pedicle flap was then injected with anesthesia.  The flap was excised through the skin and subcutaneous tissue down to the layer of the underlying musculature.  The pedicle flap was carefully excised within this deep plane to maintain its blood supply.  The edges of the donor site were undermined.   The donor site was closed in a primary fashion.  The pedicle was then rotated into position and sutured.  Once the tube was sutured into place, adequate blood supply was confirmed with blanching and refill.  The pedicle was then wrapped with xeroform gauze and dressed appropriately with a telfa and gauze bandage to ensure continued blood supply and protect the attached pedicle.
Home Suture Removal Text: Patient was provided instructions on removing sutures and will remove their sutures at home.  If they have any questions or difficulties they will call the office.
Advancement Flap (Double) Text: The defect edges were debeveled with a #15 scalpel blade.  Given the location of the defect and the proximity to free margins a double advancement flap was deemed most appropriate.  Using a sterile surgical marker, the appropriate advancement flaps were drawn incorporating the defect and placing the expected incisions within the relaxed skin tension lines where possible.    The area thus outlined was incised deep to adipose tissue with a #15 scalpel blade.  The skin margins were undermined to an appropriate distance in all directions utilizing iris scissors.
H Plasty Text: Given the location of the defect, shape of the defect and the proximity to free margins a H-plasty was deemed most appropriate for repair.  Using a sterile surgical marker, the appropriate advancement arms of the H-plasty were drawn incorporating the defect and placing the expected incisions within the relaxed skin tension lines where possible. The area thus outlined was incised deep to adipose tissue with a #15 scalpel blade. The skin margins were undermined to an appropriate distance in all directions utilizing iris scissors.  The opposing advancement arms were then advanced into place in opposite direction and anchored with interrupted buried subcutaneous sutures.
Localized Dermabrasion With Wire Brush Text: The patient was draped in routine manner.  Localized dermabrasion using 3 x 17 mm wire brush was performed in routine manner to papillary dermis. This spot dermabrasion is being performed to complete skin cancer reconstruction. It also will eliminate the other sun damaged precancerous cells that are known to be part of the regional effect of a lifetime's worth of sun exposure. This localized dermabrasion is therapeutic and should not be considered cosmetic in any regard.
Graft Donor Site Dermal Sutures (Optional): 5-0 Polysorb
Subsequent Stages Histo Method Verbiage: Using a similar technique to that described above, a thin layer of tissue was removed from all areas where tumor was visible on the previous stage.  The tissue was again oriented, mapped, dyed, and processed as above.
Island Pedicle Flap Text: The defect edges were debeveled with a #15 scalpel blade.  Given the location of the defect, shape of the defect and the proximity to free margins an island pedicle advancement flap was deemed most appropriate.  Using a sterile surgical marker, an appropriate advancement flap was drawn incorporating the defect, outlining the appropriate donor tissue and placing the expected incisions within the relaxed skin tension lines where possible.    The area thus outlined was incised deep to adipose tissue with a #15 scalpel blade.  The skin margins were undermined to an appropriate distance in all directions around the primary defect and laterally outward around the island pedicle utilizing iris scissors.  There was minimal undermining beneath the pedicle flap.
Previous Accession (Optional): [id number]
Epidermal Closure Graft Donor Site (Optional): running
Cartilage Graft Text: The defect edges were debeveled with a #15 scalpel blade.  Given the location of the defect, shape of the defect, the fact the defect involved a full thickness cartilage defect a cartilage graft was deemed most appropriate.  An appropriate donor site was identified, cleansed, and anesthetized. The cartilage graft was then harvested and transferred to the recipient site, oriented appropriately and then sutured into place.  The secondary defect was then repaired using a primary closure.
Consent (Nose)/Introductory Paragraph: The rationale for Mohs was explained to the patient and consent was obtained. The risks, benefits and alternatives to therapy were discussed in detail. Specifically, the risks of nasal deformity, changes in the flow of air through the nose, infection, scarring, bleeding, prolonged wound healing, incomplete removal, allergy to anesthesia, nerve injury and recurrence were addressed. Prior to the procedure, the treatment site was clearly identified and confirmed by the patient. All components of Universal Protocol/PAUSE Rule completed.
Graft Cartilage Fenestration Text: The cartilage was fenestrated with a 2mm punch biopsy to help facilitate graft survival and healing.
Tissue Cultured Epidermal Autograft Text: The defect edges were debeveled with a #15 scalpel blade.  Given the location of the defect, shape of the defect and the proximity to free margins a tissue cultured epidermal autograft was deemed most appropriate.  The graft was then trimmed to fit the size of the defect.  The graft was then placed in the primary defect and oriented appropriately.
O-T Advancement Flap Text: The defect edges were debeveled with a #15 scalpel blade.  Given the location of the defect, shape of the defect and the proximity to free margins an O-T advancement flap was deemed most appropriate.  Using a sterile surgical marker, an appropriate advancement flap was drawn incorporating the defect and placing the expected incisions within the relaxed skin tension lines where possible.    The area thus outlined was incised deep to adipose tissue with a #15 scalpel blade.  The skin margins were undermined to an appropriate distance in all directions utilizing iris scissors.
Bilobed Flap Text: The defect edges were debeveled with a #15 scalpel blade.  Given the location of the defect and the proximity to free margins a bilobe flap was deemed most appropriate.  Using a sterile surgical marker, an appropriate bilobe flap drawn around the defect.    The area thus outlined was incised deep to adipose tissue with a #15 scalpel blade.  The skin margins were undermined to an appropriate distance in all directions utilizing iris scissors.
Mohs Method Verbiage: An incision at a 45 degree angle following the standard Mohs approach was done and the specimen was harvested as a microscopic controlled layer.
Xenograft Text: The defect edges were debeveled with a #15 scalpel blade.  Given the location of the defect, shape of the defect and the proximity to free margins a xenograft was deemed most appropriate.  The graft was then trimmed to fit the size of the defect.  The graft was then placed in the primary defect and oriented appropriately.

## 2018-10-25 VITALS — HEART RATE: 62 BPM | SYSTOLIC BLOOD PRESSURE: 118 MMHG | DIASTOLIC BLOOD PRESSURE: 68 MMHG

## 2018-10-31 ENCOUNTER — APPOINTMENT (RX ONLY)
Dept: URBAN - METROPOLITAN AREA CLINIC 36 | Facility: CLINIC | Age: 73
Setting detail: DERMATOLOGY
End: 2018-10-31

## 2018-10-31 DIAGNOSIS — Z48.02 ENCOUNTER FOR REMOVAL OF SUTURES: ICD-10-CM

## 2018-10-31 PROCEDURE — 99024 POSTOP FOLLOW-UP VISIT: CPT

## 2018-10-31 PROCEDURE — ? SUTURE REMOVAL (GLOBAL PERIOD)

## 2018-10-31 ASSESSMENT — LOCATION SIMPLE DESCRIPTION DERM: LOCATION SIMPLE: RIGHT CHEEK

## 2018-10-31 ASSESSMENT — LOCATION ZONE DERM: LOCATION ZONE: FACE

## 2018-10-31 ASSESSMENT — LOCATION DETAILED DESCRIPTION DERM: LOCATION DETAILED: RIGHT MEDIAL MALAR CHEEK

## 2018-10-31 NOTE — PROCEDURE: SUTURE REMOVAL (GLOBAL PERIOD)
Add 94173 Cpt? (Important Note: In 2017 The Use Of 29753 Is Being Tracked By Cms To Determine Future Global Period Reimbursement For Global Periods): yes
Detail Level: Detailed

## 2018-11-21 ENCOUNTER — APPOINTMENT (RX ONLY)
Dept: URBAN - METROPOLITAN AREA CLINIC 36 | Facility: CLINIC | Age: 73
Setting detail: DERMATOLOGY
End: 2018-11-21

## 2018-11-21 DIAGNOSIS — Z48.817 ENCOUNTER FOR SURGICAL AFTERCARE FOLLOWING SURGERY ON THE SKIN AND SUBCUTANEOUS TISSUE: ICD-10-CM

## 2018-11-21 PROCEDURE — ? POST-OP WOUND CHECK

## 2018-11-21 PROCEDURE — 99024 POSTOP FOLLOW-UP VISIT: CPT

## 2018-11-21 ASSESSMENT — LOCATION DETAILED DESCRIPTION DERM: LOCATION DETAILED: RIGHT SUPERIOR MEDIAL MALAR CHEEK

## 2018-11-21 ASSESSMENT — LOCATION ZONE DERM: LOCATION ZONE: FACE

## 2018-11-21 ASSESSMENT — LOCATION SIMPLE DESCRIPTION DERM: LOCATION SIMPLE: RIGHT CHEEK

## 2018-11-21 NOTE — PROCEDURE: POST-OP WOUND CHECK
Detail Level: Detailed
Add 93264 Cpt? (Important Note: In 2017 The Use Of 24205 Is Being Tracked By Cms To Determine Future Global Period Reimbursement For Global Periods): yes
Wound Evaluated By: Luci Chadwick MD

## 2019-02-25 ENCOUNTER — TELEPHONE (OUTPATIENT)
Dept: MEDICAL GROUP | Facility: MEDICAL CENTER | Age: 74
End: 2019-02-25

## 2019-02-25 ENCOUNTER — HOSPITAL ENCOUNTER (OUTPATIENT)
Dept: LAB | Facility: MEDICAL CENTER | Age: 74
End: 2019-02-25
Attending: INTERNAL MEDICINE
Payer: MEDICARE

## 2019-02-25 DIAGNOSIS — I10 ESSENTIAL HYPERTENSION, BENIGN: ICD-10-CM

## 2019-02-25 DIAGNOSIS — E78.5 HYPERLIPIDEMIA, UNSPECIFIED HYPERLIPIDEMIA TYPE: ICD-10-CM

## 2019-02-25 LAB
ANION GAP SERPL CALC-SCNC: 6 MMOL/L (ref 0–11.9)
BUN SERPL-MCNC: 17 MG/DL (ref 8–22)
CALCIUM SERPL-MCNC: 9.6 MG/DL (ref 8.5–10.5)
CHLORIDE SERPL-SCNC: 106 MMOL/L (ref 96–112)
CHOLEST SERPL-MCNC: 171 MG/DL (ref 100–199)
CO2 SERPL-SCNC: 26 MMOL/L (ref 20–33)
CREAT SERPL-MCNC: 0.98 MG/DL (ref 0.5–1.4)
FASTING STATUS PATIENT QL REPORTED: NORMAL
GLUCOSE SERPL-MCNC: 102 MG/DL (ref 65–99)
HDLC SERPL-MCNC: 91 MG/DL
LDLC SERPL CALC-MCNC: 70 MG/DL
POTASSIUM SERPL-SCNC: 4.5 MMOL/L (ref 3.6–5.5)
SODIUM SERPL-SCNC: 138 MMOL/L (ref 135–145)
TRIGL SERPL-MCNC: 52 MG/DL (ref 0–149)

## 2019-02-25 PROCEDURE — 80048 BASIC METABOLIC PNL TOTAL CA: CPT

## 2019-02-25 PROCEDURE — 80061 LIPID PANEL: CPT

## 2019-02-25 PROCEDURE — 36415 COLL VENOUS BLD VENIPUNCTURE: CPT

## 2019-02-25 NOTE — TELEPHONE ENCOUNTER
Future Appointments       Provider Department Center    3/4/2019 9:40 AM Binu Dave M.D.; [facility] [address]        FULL PVP ANNUAL WELLNESS VISIT PRE-VISIT PLANNING WITHOUT OUTREACH    1.  Reviewed note from last office visit with PCP: YES Last office visit: 08/28/18    2.  If any orders were placed at last visit, do we have Results/Consult Notes?        •  Labs - Labs ordered, completed on 02/25/19 and results are in chart.       •  Imaging - Imaging was not ordered at last office visit.       •  Referrals - No referrals were ordered at last office visit.    3.  Immunizations were updated in [place] using WebIZ?: Yes       •  WebIZ Recommendations: SHINGRIX (Shingles)        •  Is patient due for Tdap? NO       •  Is patient due for Shingrix? YES. Patient was notified of copay/out of pocket cost.     4.  Patient is due for the following Health Maintenance Topics:   Health Maintenance Due   Topic Date Due   • IMM ZOSTER VACCINES (2 of 2) 02/18/1997   • IMM INFLUENZA (1) 09/01/2018       5.  Reviewed/Updated the following with patient:       •   Preferred Pharmacy? YES       •   Preferred Lab? YES       •   Preferred Communication? YES       •   Allergies? YES       •   Medications? NO       •   Social History? YES. Was Abstract Encounter opened and chart updated? YES       •   Family History (document living status of immediate family members and if + hx of  cancer, diabetes, hypertension, hyperlipidemia, heart attack, stroke) YES. Was Abstract Encounter opened and chart updated? YES    6.  Care Team Updated:       •   DME Company (gait device, O2, CPAP, etc.): YES       •   Other Specialists (eye doctor, derm, GYN, cardiology, endo, etc): YES             Last eye exam: 10/2018    7. Orders for overdue Health Maintenance topics pended in Pre-Charting? N\A    8.  Patient has the following Care Path diagnoses on Problem List:  NONE    9.  Patient was advised: “This is a free  wellness visit. The provider will screen for medical conditions to help you stay healthy. If you have other concerns to address you may be asked to discuss these at a separate visit or there may be an additional fee.”     10.  Patient was informed to arrive 15 min prior to their scheduled appointment and bring in their medication bottles.

## 2019-03-04 ENCOUNTER — OFFICE VISIT (OUTPATIENT)
Dept: MEDICAL GROUP | Facility: MEDICAL CENTER | Age: 74
End: 2019-03-04
Payer: MEDICARE

## 2019-03-04 VITALS
DIASTOLIC BLOOD PRESSURE: 68 MMHG | BODY MASS INDEX: 25.56 KG/M2 | WEIGHT: 178.5 LBS | HEART RATE: 61 BPM | HEIGHT: 70 IN | TEMPERATURE: 98.2 F | SYSTOLIC BLOOD PRESSURE: 120 MMHG | OXYGEN SATURATION: 96 %

## 2019-03-04 DIAGNOSIS — I48.0 PAF (PAROXYSMAL ATRIAL FIBRILLATION) (HCC): ICD-10-CM

## 2019-03-04 DIAGNOSIS — E78.5 HYPERLIPIDEMIA, UNSPECIFIED HYPERLIPIDEMIA TYPE: ICD-10-CM

## 2019-03-04 DIAGNOSIS — I10 ESSENTIAL HYPERTENSION, BENIGN: ICD-10-CM

## 2019-03-04 DIAGNOSIS — Z86.73 HISTORY OF CVA (CEREBROVASCULAR ACCIDENT): ICD-10-CM

## 2019-03-04 DIAGNOSIS — Z00.00 MEDICARE ANNUAL WELLNESS VISIT, SUBSEQUENT: ICD-10-CM

## 2019-03-04 PROCEDURE — G0439 PPPS, SUBSEQ VISIT: HCPCS | Performed by: INTERNAL MEDICINE

## 2019-03-04 RX ORDER — ACETAMINOPHEN 500 MG
500-1000 TABLET ORAL EVERY 6 HOURS PRN
COMMUNITY
End: 2022-02-15

## 2019-03-04 ASSESSMENT — ENCOUNTER SYMPTOMS: GENERAL WELL-BEING: EXCELLENT

## 2019-03-04 ASSESSMENT — PATIENT HEALTH QUESTIONNAIRE - PHQ9: CLINICAL INTERPRETATION OF PHQ2 SCORE: 0

## 2019-03-04 ASSESSMENT — ACTIVITIES OF DAILY LIVING (ADL): BATHING_REQUIRES_ASSISTANCE: 0

## 2019-03-04 NOTE — PROGRESS NOTES
Chief Complaint   Patient presents with   • Annual Wellness Visit         HPI:  Babar is a 74 y.o. here for Medicare Annual Wellness Visit        Patient Active Problem List    Diagnosis Date Noted   • Mass of right chest wall 10/13/2017   • Screen for colon cancer 08/30/2017   • Hyperlipidemia 02/22/2016   • Essential hypertension, benign 08/21/2015   • History of CVA (cerebrovascular accident) 02/20/2015   • PAF (paroxysmal atrial fibrillation) (HCC) 02/20/2015   • Medicare annual wellness visit, subsequent 02/20/2015       Current Outpatient Prescriptions   Medication Sig Dispense Refill   • acetaminophen (TYLENOL) 500 MG Tab Take 500-1,000 mg by mouth every 6 hours as needed.     • Glucos-Chondroit-Hyaluron-MSM (GLUCOSAMINE CHONDROITIN JOINT PO) Take  by mouth.     • metoprolol SR (TOPROL XL) 50 MG TABLET SR 24 HR Take 50 mg by mouth every day.     • ramipril (ALTACE) 2.5 MG Cap Take 2.5 mg by mouth every day.     • atorvastatin (LIPITOR) 10 MG TABS Take 10 mg by mouth every evening.     • rivaroxaban (XARELTO) 20 MG TABS tablet Take 20 mg by mouth with dinner.     • SHINGRIX 50 MCG Recon Susp TO BE ADMINISTERED BY PHARMACIST FOR IMMUNIZATION  0     No current facility-administered medications for this visit.         Patient is taking medications as noted in medication list.  Current supplements as per medication list.     Allergies: Patient has no known allergies.    Current social contact/activities: No formal exercise currently but mentioned the snow and removal and splits wood weekly.  Stopped going to the gym.  Former . Lives with his wife and 4 cats.     Is patient current with immunizations? Yes.    He  reports that he quit smoking about 50 years ago. His smoking use included Cigarettes. He has a 1.00 pack-year smoking history. He has never used smokeless tobacco. He reports that he drinks about 6.0 oz of alcohol per week . He reports that he does not use drugs.  Counseling given:  No        DPA/Advanced directive: Patient has Advanced Directive on file.     ROS:    Gait: Uses no assistive device   Ostomy: No   Other tubes: No   Amputations: No   Chronic oxygen use No   Last eye exam 10/2018    Wears hearing aids: YES - bilaterally   : Denies any urinary leakage during the last 6 months        Depression Screening    Little interest or pleasure in doing things?  0 - not at all  Feeling down, depressed, or hopeless? 0 - not at all  Patient Health Questionnaire Score: 0    If depressive symptoms identified deferred to follow up visit unless specifically addressed in assessment and plan.    Interpretation of PHQ-9 Total Score   Score Severity   1-4 No Depression   5-9 Mild Depression   10-14 Moderate Depression   15-19 Moderately Severe Depression   20-27 Severe Depression    Screening for Cognitive Impairment    Three Minute Recall (leader, season, table)   3/3  [place]rell clock face with all 12 numbers and set the hands to show 10 past 11.    5/5 Wrote in   Eagle numerals and Irish numbers  If cognitive concerns identified, deferred for follow up unless specifically addressed in assessment and plan.    Fall Risk Assessment    Has the patient had two or more falls in the last year or any fall with injury in the last year?  No  If fall risk identified, deferred for follow up unless specifically addressed in assessment and plan.    Safety Assessment    Throw rugs on floor.  Yes  Handrails on all stairs.  No  Good lighting in all hallways.  No  Difficulty hearing.  Yes  Patient counseled about all safety risks that were identified.    Functional Assessment ADLs    Are there any barriers preventing you from cooking for yourself or meeting nutritional needs?  No.    Are there any barriers preventing you from driving safely or obtaining transportation?  No.    Are there any barriers preventing you from using a telephone or calling for help?  No.    Are there any barriers preventing you from shopping?   No.    Are there any barriers preventing you from taking care of your own finances?  No.    Are there any barriers preventing you from managing your medications?  No.    Are there any barriers preventing you from showering, bathing or dressing yourself?  No.    Are you currently engaging in any exercise or physical activity?   .  No formal exercise currently but mentioned the snow and removal. Stopped going to the gym.  Former .  What is your perception of your health?  Excellent.    Health Maintenance Summary                IMM ZOSTER VACCINES Overdue 2/18/1997     COLON CANCER SCREENING ANNUAL FIT Next Due 9/4/2019      Done 9/4/2018 OCCULT BLOOD FECES IMMUNOASSAY     Patient has more history with this topic...    Annual Wellness Visit Next Due 3/4/2020      Done 3/4/2019      Patient has more history with this topic...    IMM DTaP/Tdap/Td Vaccine Next Due 2/5/2023      Done 2/5/2013 Imm Admin: Tdap Vaccine          Patient Care Team:  Binu Dave M.D. as PCP - General (Internal Medicine)  Chava Velez D.O. as Consulting Physician (Cardiology)  Deborah Alan O.D. as Consulting Physician (Optometry)  JONATHAN Darden. as Consulting Physician (Audiology)  Howard Mckeon M.D. as Consulting Physician (Orthopaedics)    Social History   Substance Use Topics   • Smoking status: Former Smoker     Packs/day: 0.50     Years: 2.00     Types: Cigarettes     Quit date: 1/1/1969   • Smokeless tobacco: Never Used      Comment: Started smoking at age 22   • Alcohol use 6.0 oz/week     10 Glasses of wine per week     Family History   Problem Relation Age of Onset   • Heart Disease Father         Arteriosclerosis    • Heart Attack Father    • Arthritis Sister    • No Known Problems Brother    • Other Mother         Gluacoma   • Stroke Brother         TIA   • Prostate cancer Brother    • Heart Disease Sister         Pacemaker   • Other Sister         Cataract   • Other Brother         Down syndrome  "  • Suicide Attempts Brother    • Depression Brother    • Cancer Maternal Aunt         intestinal    • No Known Problems Daughter      He  has a past medical history of Allergy; History of CVA (cerebrovascular accident) (2/20/2015); Medicare annual wellness visit, subsequent (2/20/2015); PAF (paroxysmal atrial fibrillation) (HCC) (2/20/2015); Stroke (HCC); and TIA (transient ischemic attack).   Past Surgical History:   Procedure Laterality Date   • INGUINAL HERNIA REPAIR      Hernia Repair, Inguinal   • KNEE ARTHROSCOPY             Exam:     Blood pressure 120/68, pulse 61, temperature 36.8 °C (98.2 °F), temperature source Temporal, height 1.785 m (5' 10.28\"), weight 81 kg (178 lb 8 oz), SpO2 96 %. Body mass index is 25.41 kg/m².    Hearing good.    Dentition good  Alert, oriented in no acute distress.  Eye contact is good, speech goal directed, affect calm.  Neck is supple and without significant lymphadenopathy.  Lungs are clear without obvious rales or wheeze.  Cardiovascular peripheral circulation is satisfactory.  Heart sounds are unremarkable.  Abdomen is soft and without obvious masses or tenderness.  There are normal bowel sounds.  Rectal and genital exams were not performed at this time.  Extremities were without significant edema, cyanosis, or deformity.  Brief neurologic exam was unremarkable.      Assessment and Plan. The following treatment and monitoring plan is recommended:      Essential hypertension, benign  Blood pressure is under good control.  He continues ramipril without difficulty.  He tries to follow a low-salt diet.  He denies lightheaded episodes.    History of CVA (cerebrovascular accident)  No new neurologic changes.  He continues Xarelto.    Hyperlipidemia  He continues atorvastatin for his hyperlipidemia.  Most recent cholesterol is 171, triglycerides 52, HDL 91, LDL 70.  We briefly reviewed appropriate diet.    PAF (paroxysmal atrial fibrillation)  No recent palpitations or lightheaded " episodes.  He continues Xarelto and metoprolol.        Services suggested: No services needed at this time  Health Care Screening recommendations as per orders if indicated.  Referrals offered: PT/OT/Nutrition counseling/Behavioral Health/Smoking cessation as per orders if indicated.    Discussion today about general wellness and lifestyle habits:    · Prevent falls and reduce trip hazards; Cautioned about securing or removing rugs.  · Have a working fire alarm and carbon monoxide detector;   · Engage in regular physical activity and social activities       Follow-up: Return in about 6 months (around 9/4/2019) for Short.

## 2019-03-04 NOTE — ASSESSMENT & PLAN NOTE
Blood pressure is under good control.  He continues ramipril without difficulty.  He tries to follow a low-salt diet.  He denies lightheaded episodes.

## 2019-03-04 NOTE — ASSESSMENT & PLAN NOTE
He continues atorvastatin for his hyperlipidemia.  Most recent cholesterol is 171, triglycerides 52, HDL 91, LDL 70.  We briefly reviewed appropriate diet.

## 2019-04-01 ENCOUNTER — APPOINTMENT (RX ONLY)
Dept: URBAN - METROPOLITAN AREA CLINIC 4 | Facility: CLINIC | Age: 74
Setting detail: DERMATOLOGY
End: 2019-04-01

## 2019-04-01 DIAGNOSIS — Z85.828 PERSONAL HISTORY OF OTHER MALIGNANT NEOPLASM OF SKIN: ICD-10-CM

## 2019-04-01 DIAGNOSIS — L57.0 ACTINIC KERATOSIS: ICD-10-CM

## 2019-04-01 DIAGNOSIS — L84 CORNS AND CALLOSITIES: ICD-10-CM

## 2019-04-01 DIAGNOSIS — D22 MELANOCYTIC NEVI: ICD-10-CM

## 2019-04-01 DIAGNOSIS — L82.1 OTHER SEBORRHEIC KERATOSIS: ICD-10-CM

## 2019-04-01 DIAGNOSIS — L81.4 OTHER MELANIN HYPERPIGMENTATION: ICD-10-CM

## 2019-04-01 DIAGNOSIS — D18.0 HEMANGIOMA: ICD-10-CM

## 2019-04-01 PROBLEM — D18.01 HEMANGIOMA OF SKIN AND SUBCUTANEOUS TISSUE: Status: ACTIVE | Noted: 2019-04-01

## 2019-04-01 PROBLEM — D22.5 MELANOCYTIC NEVI OF TRUNK: Status: ACTIVE | Noted: 2019-04-01

## 2019-04-01 PROBLEM — D48.5 NEOPLASM OF UNCERTAIN BEHAVIOR OF SKIN: Status: ACTIVE | Noted: 2019-04-01

## 2019-04-01 PROCEDURE — ? LIQUID NITROGEN

## 2019-04-01 PROCEDURE — 17000 DESTRUCT PREMALG LESION: CPT

## 2019-04-01 PROCEDURE — ? TREATMENT REGIMEN

## 2019-04-01 PROCEDURE — 99213 OFFICE O/P EST LOW 20 MIN: CPT | Mod: 25

## 2019-04-01 PROCEDURE — ? OBSERVATION

## 2019-04-01 ASSESSMENT — LOCATION SIMPLE DESCRIPTION DERM
LOCATION SIMPLE: LEFT MIDDLE FINGER
LOCATION SIMPLE: RIGHT LOWER BACK
LOCATION SIMPLE: UPPER BACK
LOCATION SIMPLE: LEFT THUMB
LOCATION SIMPLE: RIGHT UPPER BACK

## 2019-04-01 ASSESSMENT — LOCATION ZONE DERM
LOCATION ZONE: FINGER
LOCATION ZONE: TRUNK

## 2019-04-01 ASSESSMENT — LOCATION DETAILED DESCRIPTION DERM
LOCATION DETAILED: SUPERIOR THORACIC SPINE
LOCATION DETAILED: LEFT PROXIMAL ULNAR THUMB
LOCATION DETAILED: INFERIOR THORACIC SPINE
LOCATION DETAILED: RIGHT SUPERIOR UPPER BACK
LOCATION DETAILED: LEFT PROXIMAL PALMAR MIDDLE FINGER
LOCATION DETAILED: RIGHT SUPERIOR MEDIAL MIDBACK
LOCATION DETAILED: RIGHT INFERIOR LATERAL MIDBACK

## 2019-09-03 ENCOUNTER — OFFICE VISIT (OUTPATIENT)
Dept: MEDICAL GROUP | Facility: MEDICAL CENTER | Age: 74
End: 2019-09-03
Payer: MEDICARE

## 2019-09-03 VITALS
WEIGHT: 171 LBS | TEMPERATURE: 97.5 F | DIASTOLIC BLOOD PRESSURE: 70 MMHG | HEIGHT: 70 IN | OXYGEN SATURATION: 98 % | SYSTOLIC BLOOD PRESSURE: 120 MMHG | HEART RATE: 53 BPM | BODY MASS INDEX: 24.48 KG/M2

## 2019-09-03 DIAGNOSIS — Z11.59 ENCOUNTER FOR HEPATITIS C SCREENING TEST FOR LOW RISK PATIENT: ICD-10-CM

## 2019-09-03 DIAGNOSIS — I10 ESSENTIAL HYPERTENSION, BENIGN: ICD-10-CM

## 2019-09-03 DIAGNOSIS — Z86.73 HISTORY OF CVA (CEREBROVASCULAR ACCIDENT): ICD-10-CM

## 2019-09-03 DIAGNOSIS — Z12.11 SCREEN FOR COLON CANCER: ICD-10-CM

## 2019-09-03 DIAGNOSIS — E78.5 HYPERLIPIDEMIA, UNSPECIFIED HYPERLIPIDEMIA TYPE: ICD-10-CM

## 2019-09-03 DIAGNOSIS — I48.0 PAF (PAROXYSMAL ATRIAL FIBRILLATION) (HCC): ICD-10-CM

## 2019-09-03 DIAGNOSIS — R73.9 HYPERGLYCEMIA: ICD-10-CM

## 2019-09-03 PROCEDURE — 99214 OFFICE O/P EST MOD 30 MIN: CPT | Performed by: INTERNAL MEDICINE

## 2019-09-03 NOTE — ASSESSMENT & PLAN NOTE
He has intermittently had elevated blood sugars.  He tries to avoid concentrated sweets.  He did not yet get his lab tests drawn.

## 2019-09-03 NOTE — ASSESSMENT & PLAN NOTE
He is not sure whether he will be getting a colonoscopy in the near future.  In the meantime he will get a stool fit test.

## 2019-09-03 NOTE — PROGRESS NOTES
Subjective:     Chief Complaint   Patient presents with   • Follow-Up     6 month    • Hypertension     Babar Willett is a 74 y.o. male here today for follow-up of hypertension and hyperlipidemia and paroxysmal atrial fibrillation and hyperglycemia.    History of CVA (cerebrovascular accident)  Prior CVA.  No new neurologic changes.  He continues Xarelto.  He denies unusual bleeding or bruising.    PAF (paroxysmal atrial fibrillation)  He continues Xarelto.  He is also taking metoprolol.  He denies recent palpitations.    Essential hypertension, benign  Blood pressures under quite good control.  He continues ramipril without difficulty and tries to follow a low-salt diet.    Hyperlipidemia  He continues Lipitor.  He has not yet had lab tests drawn.    Screen for colon cancer  He is not sure whether he will be getting a colonoscopy in the near future.  In the meantime he will get a stool fit test.    Hyperglycemia  He has intermittently had elevated blood sugars.  He tries to avoid concentrated sweets.  He did not yet get his lab tests drawn.       Diagnoses of Essential hypertension, benign, Hyperlipidemia, unspecified hyperlipidemia type, PAF (paroxysmal atrial fibrillation) (HCC), History of CVA (cerebrovascular accident), Encounter for hepatitis C screening test for low risk patient, Screen for colon cancer, and Hyperglycemia were pertinent to this visit.    Allergies: Patient has no known allergies.  Current medicines (including changes today)  Current Outpatient Medications   Medication Sig Dispense Refill   • SHINGRIX 50 MCG Recon Susp TO BE ADMINISTERED BY PHARMACIST FOR IMMUNIZATION  0   • Glucos-Chondroit-Hyaluron-MSM (GLUCOSAMINE CHONDROITIN JOINT PO) Take  by mouth.     • metoprolol SR (TOPROL XL) 50 MG TABLET SR 24 HR Take 50 mg by mouth every day.     • ramipril (ALTACE) 2.5 MG Cap Take 2.5 mg by mouth every day.     • atorvastatin (LIPITOR) 10 MG TABS Take 10 mg by mouth every evening.     •  "rivaroxaban (XARELTO) 20 MG TABS tablet Take 20 mg by mouth with dinner.     • acetaminophen (TYLENOL) 500 MG Tab Take 500-1,000 mg by mouth every 6 hours as needed.       No current facility-administered medications for this visit.        He  has a past medical history of Allergy, History of CVA (cerebrovascular accident) (2/20/2015), Hyperglycemia (9/3/2019), Medicare annual wellness visit, subsequent (2/20/2015), PAF (paroxysmal atrial fibrillation) (HCC) (2/20/2015), Stroke ([facility]), and TIA (transient ischemic attack).    ROS    Patient denies significant change in strength, weight or appetite.  No significant lightheadedness or headaches.  No change in vision, hearing, or swallowing.  No new dyspnea, coughing, chest pain, or palpitations.  No indigestion, abdominal pain, or change in bowel habits.  No change in urinating.  No new ankle swelling.       Objective:     PE:  /70 (BP Location: Left arm, Patient Position: Sitting)   Pulse (!) 53   Temp 36.4 °C (97.5 °F)   Ht 1.778 m (5' 10\")   Wt 77.6 kg (171 lb)   SpO2 98%   BMI 24.54 kg/m²    Neck is supple without significant lymphadenopathy or masses.  Lungs are clear with normal breath sounds without wheezes or rales .  Cardiovascular: peripheral circulation is satisfactory, heart sounds are unchanged and unremarkable.  Heart rhythm is regular.  Abdomen is soft, without masses or tenderness, with normal bowel sounds.  Extremities are without significant edema, cyanosis or deformity.      Assessment and Plan:   The following treatment plan was discussed  1. Essential hypertension, benign  Basic Metabolic Panel    Continue ramipril and low-salt diet.  Report any lightheadedness or headaches.   2. Hyperlipidemia, unspecified hyperlipidemia type  Lipid Profile    He will continue atorvastatin and appropriate diet.  We will inform him of pending lab results.   3. PAF (paroxysmal atrial fibrillation) (HCC)      He will report any palpitations or " lightheadedness.   4. History of CVA (cerebrovascular accident)      He will go to emergency room immediately if he notes any neurologic changes.   5. Encounter for hepatitis C screening test for low risk patient  HEP C VIRUS ANTIBODY    He is a low risk candidate but we will order a hepatitis C screen.   6. Screen for colon cancer  OCCULT BLOOD FECES IMMUNOASSAY    I gave him a stool fit test kit.   7. Hyperglycemia      He was encouraged to avoid concentrated sweets.       Followup: Return in about 6 months (around 3/3/2020), or health  wellness, for Long.

## 2019-09-03 NOTE — ASSESSMENT & PLAN NOTE
Blood pressures under quite good control.  He continues ramipril without difficulty and tries to follow a low-salt diet.

## 2019-09-03 NOTE — ASSESSMENT & PLAN NOTE
Prior CVA.  No new neurologic changes.  He continues Xarelto.  He denies unusual bleeding or bruising.

## 2019-09-16 ENCOUNTER — HOSPITAL ENCOUNTER (OUTPATIENT)
Facility: MEDICAL CENTER | Age: 74
End: 2019-09-16
Attending: INTERNAL MEDICINE
Payer: MEDICARE

## 2019-09-16 PROCEDURE — 82274 ASSAY TEST FOR BLOOD FECAL: CPT

## 2019-09-24 DIAGNOSIS — Z12.11 SCREEN FOR COLON CANCER: ICD-10-CM

## 2019-09-25 LAB — HEMOCCULT STL QL IA: NEGATIVE

## 2019-09-26 NOTE — HPI: PROCEDURE (MOHS)
Dr Mae's Office Hours:  Monday: 10:30 - 6:00  Tuesday: 7:00 - 2:30   Wednesday 7:00 - 2:30  Thursday: 7:30 - 3:00  Friday 7:30 - 3:00  Saturday(every 4th): 7:00-11:00    To review your office visit summary and see your test results, please sign up for the [facility] [email or web address].    
Has The Growth Been Previously Biopsied?: has been previously biopsied
Additional History: Referral from Dr Nick Kim.

## 2019-11-12 ENCOUNTER — HOSPITAL ENCOUNTER (OUTPATIENT)
Dept: LAB | Facility: MEDICAL CENTER | Age: 74
End: 2019-11-12
Attending: INTERNAL MEDICINE
Payer: MEDICARE

## 2019-11-12 DIAGNOSIS — I10 ESSENTIAL HYPERTENSION, BENIGN: ICD-10-CM

## 2019-11-12 DIAGNOSIS — E78.5 HYPERLIPIDEMIA, UNSPECIFIED HYPERLIPIDEMIA TYPE: ICD-10-CM

## 2019-11-12 LAB
ANION GAP SERPL CALC-SCNC: 7 MMOL/L (ref 0–11.9)
BUN SERPL-MCNC: 18 MG/DL (ref 8–22)
CALCIUM SERPL-MCNC: 9 MG/DL (ref 8.5–10.5)
CHLORIDE SERPL-SCNC: 106 MMOL/L (ref 96–112)
CHOLEST SERPL-MCNC: 162 MG/DL (ref 100–199)
CO2 SERPL-SCNC: 28 MMOL/L (ref 20–33)
CREAT SERPL-MCNC: 0.93 MG/DL (ref 0.5–1.4)
GLUCOSE SERPL-MCNC: 88 MG/DL (ref 65–99)
HDLC SERPL-MCNC: 80 MG/DL
LDLC SERPL CALC-MCNC: 70 MG/DL
POTASSIUM SERPL-SCNC: 4.4 MMOL/L (ref 3.6–5.5)
SODIUM SERPL-SCNC: 141 MMOL/L (ref 135–145)
TRIGL SERPL-MCNC: 61 MG/DL (ref 0–149)

## 2019-11-12 PROCEDURE — 36415 COLL VENOUS BLD VENIPUNCTURE: CPT

## 2019-11-12 PROCEDURE — 80061 LIPID PANEL: CPT

## 2019-11-12 PROCEDURE — 80048 BASIC METABOLIC PNL TOTAL CA: CPT

## 2020-01-15 ENCOUNTER — TELEPHONE (OUTPATIENT)
Dept: MEDICAL GROUP | Facility: MEDICAL CENTER | Age: 75
End: 2020-01-15

## 2020-01-15 DIAGNOSIS — H91.10 PRESBYCUSIS, UNSPECIFIED LATERALITY: ICD-10-CM

## 2020-01-15 NOTE — TELEPHONE ENCOUNTER
1. Caller Name: Babar Willett                                         Call Back Number: [phone] (home) [phone] (work)      Patient approves a detailed voicemail message: yes    Patient called asking for a referral to see his Audilogist which is [facility].    Fax Number: [phone]

## 2020-03-02 ENCOUNTER — HOSPITAL ENCOUNTER (OUTPATIENT)
Dept: LAB | Facility: MEDICAL CENTER | Age: 75
End: 2020-03-02
Attending: INTERNAL MEDICINE
Payer: MEDICARE

## 2020-03-02 DIAGNOSIS — I10 ESSENTIAL HYPERTENSION, BENIGN: ICD-10-CM

## 2020-03-02 DIAGNOSIS — Z11.59 ENCOUNTER FOR HEPATITIS C SCREENING TEST FOR LOW RISK PATIENT: ICD-10-CM

## 2020-03-02 DIAGNOSIS — E78.5 HYPERLIPIDEMIA, UNSPECIFIED HYPERLIPIDEMIA TYPE: ICD-10-CM

## 2020-03-02 LAB
ANION GAP SERPL CALC-SCNC: 9 MMOL/L (ref 0–11.9)
BUN SERPL-MCNC: 11 MG/DL (ref 8–22)
CALCIUM SERPL-MCNC: 9.3 MG/DL (ref 8.5–10.5)
CHLORIDE SERPL-SCNC: 106 MMOL/L (ref 96–112)
CHOLEST SERPL-MCNC: 182 MG/DL (ref 100–199)
CO2 SERPL-SCNC: 26 MMOL/L (ref 20–33)
CREAT SERPL-MCNC: 0.96 MG/DL (ref 0.5–1.4)
GLUCOSE SERPL-MCNC: 101 MG/DL (ref 65–99)
HCV AB SER QL: NEGATIVE
HDLC SERPL-MCNC: 90 MG/DL
LDLC SERPL CALC-MCNC: 79 MG/DL
POTASSIUM SERPL-SCNC: 4.5 MMOL/L (ref 3.6–5.5)
SODIUM SERPL-SCNC: 141 MMOL/L (ref 135–145)
TRIGL SERPL-MCNC: 66 MG/DL (ref 0–149)

## 2020-03-02 PROCEDURE — 86803 HEPATITIS C AB TEST: CPT

## 2020-03-02 PROCEDURE — 36415 COLL VENOUS BLD VENIPUNCTURE: CPT

## 2020-03-02 PROCEDURE — 80061 LIPID PANEL: CPT

## 2020-03-02 PROCEDURE — 80048 BASIC METABOLIC PNL TOTAL CA: CPT

## 2020-03-10 ENCOUNTER — OFFICE VISIT (OUTPATIENT)
Dept: MEDICAL GROUP | Facility: MEDICAL CENTER | Age: 75
End: 2020-03-10
Payer: MEDICARE

## 2020-03-10 VITALS
OXYGEN SATURATION: 97 % | WEIGHT: 178 LBS | HEART RATE: 61 BPM | BODY MASS INDEX: 25.48 KG/M2 | SYSTOLIC BLOOD PRESSURE: 120 MMHG | HEIGHT: 70 IN | TEMPERATURE: 97.3 F | DIASTOLIC BLOOD PRESSURE: 74 MMHG

## 2020-03-10 DIAGNOSIS — R73.9 HYPERGLYCEMIA: ICD-10-CM

## 2020-03-10 DIAGNOSIS — I48.0 PAF (PAROXYSMAL ATRIAL FIBRILLATION) (HCC): ICD-10-CM

## 2020-03-10 DIAGNOSIS — E78.5 HYPERLIPIDEMIA, UNSPECIFIED HYPERLIPIDEMIA TYPE: ICD-10-CM

## 2020-03-10 DIAGNOSIS — Z00.00 MEDICARE ANNUAL WELLNESS VISIT, SUBSEQUENT: ICD-10-CM

## 2020-03-10 DIAGNOSIS — Z86.73 HISTORY OF CVA (CEREBROVASCULAR ACCIDENT): ICD-10-CM

## 2020-03-10 DIAGNOSIS — I10 ESSENTIAL HYPERTENSION, BENIGN: ICD-10-CM

## 2020-03-10 PROCEDURE — G0439 PPPS, SUBSEQ VISIT: HCPCS | Performed by: INTERNAL MEDICINE

## 2020-03-10 ASSESSMENT — PATIENT HEALTH QUESTIONNAIRE - PHQ9: CLINICAL INTERPRETATION OF PHQ2 SCORE: 0

## 2020-03-10 ASSESSMENT — ACTIVITIES OF DAILY LIVING (ADL): BATHING_REQUIRES_ASSISTANCE: 0

## 2020-03-10 ASSESSMENT — ENCOUNTER SYMPTOMS: GENERAL WELL-BEING: EXCELLENT

## 2020-03-10 NOTE — ASSESSMENT & PLAN NOTE
Blood sugar is again mildly elevated at 101.  He does not eat a lot of sweets.  He was encouraged to avoid concentrated sweets especially on an empty stomach.

## 2020-03-10 NOTE — ASSESSMENT & PLAN NOTE
History of paroxysmal atrial fibrillation status post ablations.  He is anticoagulated with Xarelto and is on metoprolol.  He did have one episode of palpitations several months ago that lasted for 5 minutes or less.  He is followed for this by Dr. Velez.

## 2020-03-10 NOTE — PROGRESS NOTES
Chief Complaint   Patient presents with   • Annual Exam     Health           HPI:  Babar Willett is a 75 y.o. here for Medicare Annual Wellness Visit     Patient Active Problem List    Diagnosis Date Noted   • Hyperglycemia 09/03/2019   • Mass of right chest wall 10/13/2017   • Screen for colon cancer 08/30/2017   • Hyperlipidemia 02/22/2016   • Essential hypertension, benign 08/21/2015   • History of CVA (cerebrovascular accident) 02/20/2015   • PAF (paroxysmal atrial fibrillation) (HCC) 02/20/2015   • Medicare annual wellness visit, subsequent 02/20/2015       Current Outpatient Medications   Medication Sig Dispense Refill   • acetaminophen (TYLENOL) 500 MG Tab Take 500-1,000 mg by mouth every 6 hours as needed.     • Glucos-Chondroit-Hyaluron-MSM (GLUCOSAMINE CHONDROITIN JOINT PO) Take  by mouth.     • metoprolol SR (TOPROL XL) 50 MG TABLET SR 24 HR Take 50 mg by mouth every day.     • ramipril (ALTACE) 2.5 MG Cap Take 2.5 mg by mouth every day.     • atorvastatin (LIPITOR) 10 MG TABS Take 10 mg by mouth every evening.     • rivaroxaban (XARELTO) 20 MG TABS tablet Take 20 mg by mouth with dinner.     • SHINGRIX 50 MCG Recon Susp TO BE ADMINISTERED BY PHARMACIST FOR IMMUNIZATION  0     No current facility-administered medications for this visit.             Current supplements as per medication list.       Allergies: Patient has no known allergies.    Current social contact/activities:      He  reports that he quit smoking about 51 years ago. His smoking use included cigarettes. He has a 1.00 pack-year smoking history. He has never used smokeless tobacco. He reports current alcohol use of about 6.0 oz of alcohol per week. He reports that he does not use drugs.  Counseling given: Not Answered  Comment: Started smoking at age 22      DPA/Advanced Directive:  Patient has Living Will on file.     ROS:    Gait: Uses no assistive device  Ostomy: No  Other tubes: No  Amputations: No  Chronic oxygen use:  No  Last eye exam: 2019  Wears hearing aids: Yes   : Denies any urinary leakage during the last 6 months    Screening:    Depression Screening    Little interest or pleasure in doing things?  0 - not at all  Feeling down, depressed , or hopeless? 0 - not at all  Patient Health Questionnaire Score: 0     If depressive symptoms identified deferred to follow up visit unless specifically addressed in assessment and plan.    Interpretation of PHQ-9 Total Score   Score Severity   1-4 No Depression   5-9 Mild Depression   10-14 Moderate Depression   15-19 Moderately Severe Depression   20-27 Severe Depression    Screening for Cognitive Impairment    Three Minute Recall (village, kitchen, baby) 33/3 3/3  [place] clock face with all 12 numbers and set the hands to show 10 past 10.    10:10 5/5  Cognitive concerns identified deferred for follow up unless specifically addressed in assessment and plan.    Fall Risk Assessment    Has the patient had two or more falls in the last year or any fall with injury in the last year?  No    Safety Assessment    Throw rugs on floor.  Yes  Handrails on all stairs.  Yes  Good lighting in all hallways.  Yes  Difficulty hearing.  No  Patient counseled about all safety risks that were identified.    Functional Assessment ADLs    Are there any barriers preventing you from cooking for yourself or meeting nutritional needs?  NoNo    Are there any barriers preventing you from driving safely or obtaining transportation?  NoNo    Are there any barriers preventing you from using a telephone or calling for help?  NoNo    Are there any barriers preventing you from shopping?  NoNo    Are there any barriers preventing you from taking care of your own finances?  NoNo    Are there any barriers preventing you from managing your medications?  NoNo    Are there any barriers preventing you from showering, bathing or dressing yourself?  NoNo    Are you currently engaging in any exercise or physical activity?   NoNo     What is your perception of your health?  ExcellentExcellent       Health Maintenance Summary                COLON CANCER SCREENING ANNUAL FIT Next Due 2020      Done 2019 OCCULT BLOOD FECES IMMUNOASSAY     Patient has more history with this topic...    Annual Wellness Visit Next Due 3/11/2021      Done 3/10/2020      Patient has more history with this topic...    IMM DTaP/Tdap/Td Vaccine Next Due 2023      Done 2013 Imm Admin: Tdap Vaccine          Patient Care Team:  Binu Dave M.D. as PCP - General (Internal Medicine)  Chava Velez D.O. as Consulting Physician (Cardiology)  Deborah Alan O.D. as Consulting Physician (Optometry)  NATHAN Darden as Consulting Physician (Audiology)  Howard Mckeon M.D. as Consulting Physician (Orthopaedics)        Social History     Tobacco Use   • Smoking status: Former Smoker     Packs/day: 0.50     Years: 2.00     Pack years: 1.00     Types: Cigarettes     Last attempt to quit: 1969     Years since quittin.2   • Smokeless tobacco: Never Used   • Tobacco comment: Started smoking at age 22   Substance Use Topics   • Alcohol use: Yes     Alcohol/week: 6.0 oz     Types: 10 Glasses of wine per week   • Drug use: No     Family History   Problem Relation Age of Onset   • Heart Disease Father         Arteriosclerosis    • Heart Attack Father    • Arthritis Sister    • No Known Problems Brother    • Other Mother         Gluacoma   • Stroke Brother         TIA   • Prostate cancer Brother    • Heart Disease Sister         Pacemaker   • Other Sister         Cataract   • Other Brother         Down syndrome   • Suicide Attempts Brother    • Depression Brother    • Cancer Maternal Aunt         intestinal    • No Known Problems Daughter      He  has a past medical history of Allergy, History of CVA (cerebrovascular accident) (2015), Hyperglycemia (9/3/2019), Medicare annual wellness visit, subsequent (2015), PAF (paroxysmal  "atrial fibrillation) (HCC) (2/20/2015), Stroke (HCC), and TIA (transient ischemic attack).   Past Surgical History:   Procedure Laterality Date   • INGUINAL HERNIA REPAIR      Hernia Repair, Inguinal   • KNEE ARTHROSCOPY         Exam:   /74 (BP Location: Left arm, Patient Position: Sitting)   Pulse 61   Temp 36.3 °C (97.3 °F)   Ht 1.778 m (5' 10\")   Wt 80.7 kg (178 lb)   SpO2 97%  Body mass index is 25.54 kg/m².    Hearing .  good  Dentition  good  Alert, oriented in no acute distress.  Eye contact is good, speech goal directed, affect calm.  Neck is supple and without significant lymphadenopathy.  Lungs are clear without obvious rales or wheeze.  Cardiovascular peripheral circulation is satisfactory and heart sounds are unremarkable.  Heart rhythm is regular.  Abdomen is soft and without obvious masses or tenderness.  There are normal bowel sounds.  Rectal and genital exams were not performed.  Extremities were without significant edema, cyanosis, or deformity.  Brief neurologic exam was unremarkable with essentially normal sensation, strength, gait, and cerebellar functions.    Assessment and Plan. The following treatment and monitoring plan is recommended:    History of CVA (cerebrovascular accident)  Prior CVA, no new neurologic symptoms.  If he notes any neurologic changes, he was instructed to go to emergency room.    PAF (paroxysmal atrial fibrillation)  History of paroxysmal atrial fibrillation status post ablations.  He is anticoagulated with Xarelto and is on metoprolol.  He did have one episode of palpitations several months ago that lasted for 5 minutes or less.  He is followed for this by Dr. Velez.    Essential hypertension, benign  He has hypertension for which he is on ramipril.  Blood pressures under good control.  He denies lightheaded episodes.    Hyperlipidemia  He is on atorvastatin for hyperlipidemia.  Most recent cholesterol is 182, triglycerides 66, HDL 90, LDL 79.  He tries to " follow appropriate diet.  He exercises regularly.  He is not significantly overweight.  He will continue same regimen.    Hyperglycemia  Blood sugar is again mildly elevated at 101.  He does not eat a lot of sweets.  He was encouraged to avoid concentrated sweets especially on an empty stomach.          Services suggested: No services needed at this time  Health Care Screening: Age-appropriate preventive services recommended by USPTF and ACIP covered by Medicare were discussed today. Services ordered if indicated and agreed upon by the patient.  Referrals offered: Community-based lifestyle interventions to reduce health risks and promote self-management and wellness, fall prevention, nutrition, physical activity, tobacco-use cessation, weight loss, and mental health services as per orders if indicated.    Discussion today about general wellness and lifestyle habits:    · Prevent falls and reduce trip hazards; Cautioned about securing or removing rugs.  · Have a working fire alarm and carbon monoxide detector;   · Engage in regular physical activity and social activities     Follow-up: 6 months if not sooner

## 2020-03-10 NOTE — ASSESSMENT & PLAN NOTE
He is on atorvastatin for hyperlipidemia.  Most recent cholesterol is 182, triglycerides 66, HDL 90, LDL 79.  He tries to follow appropriate diet.  He exercises regularly.  He is not significantly overweight.  He will continue same regimen.

## 2020-03-10 NOTE — ASSESSMENT & PLAN NOTE
Prior CVA, no new neurologic symptoms.  If he notes any neurologic changes, he was instructed to go to emergency room.

## 2020-05-20 ENCOUNTER — APPOINTMENT (RX ONLY)
Dept: URBAN - METROPOLITAN AREA CLINIC 4 | Facility: CLINIC | Age: 75
Setting detail: DERMATOLOGY
End: 2020-05-20

## 2020-05-20 DIAGNOSIS — D18.0 HEMANGIOMA: ICD-10-CM

## 2020-05-20 DIAGNOSIS — D22 MELANOCYTIC NEVI: ICD-10-CM

## 2020-05-20 DIAGNOSIS — L57.0 ACTINIC KERATOSIS: ICD-10-CM

## 2020-05-20 DIAGNOSIS — L82.0 INFLAMED SEBORRHEIC KERATOSIS: ICD-10-CM

## 2020-05-20 DIAGNOSIS — L82.1 OTHER SEBORRHEIC KERATOSIS: ICD-10-CM

## 2020-05-20 DIAGNOSIS — Z85.828 PERSONAL HISTORY OF OTHER MALIGNANT NEOPLASM OF SKIN: ICD-10-CM

## 2020-05-20 DIAGNOSIS — L81.4 OTHER MELANIN HYPERPIGMENTATION: ICD-10-CM

## 2020-05-20 PROBLEM — D22.5 MELANOCYTIC NEVI OF TRUNK: Status: ACTIVE | Noted: 2020-05-20

## 2020-05-20 PROBLEM — D18.01 HEMANGIOMA OF SKIN AND SUBCUTANEOUS TISSUE: Status: ACTIVE | Noted: 2020-05-20

## 2020-05-20 PROCEDURE — ? OBSERVATION

## 2020-05-20 PROCEDURE — 99213 OFFICE O/P EST LOW 20 MIN: CPT | Mod: 25

## 2020-05-20 PROCEDURE — 17003 DESTRUCT PREMALG LES 2-14: CPT

## 2020-05-20 PROCEDURE — ? LIQUID NITROGEN (COSMETIC)

## 2020-05-20 PROCEDURE — 17000 DESTRUCT PREMALG LESION: CPT

## 2020-05-20 PROCEDURE — ? LIQUID NITROGEN

## 2020-05-20 ASSESSMENT — LOCATION ZONE DERM
LOCATION ZONE: LEG
LOCATION ZONE: EAR
LOCATION ZONE: TRUNK
LOCATION ZONE: NECK
LOCATION ZONE: ARM
LOCATION ZONE: FACE

## 2020-05-20 ASSESSMENT — LOCATION SIMPLE DESCRIPTION DERM
LOCATION SIMPLE: RIGHT LOWER BACK
LOCATION SIMPLE: RIGHT FOREARM
LOCATION SIMPLE: POSTERIOR NECK
LOCATION SIMPLE: LEFT TEMPLE
LOCATION SIMPLE: RIGHT UPPER BACK
LOCATION SIMPLE: RIGHT POSTERIOR THIGH
LOCATION SIMPLE: LEFT CHEEK
LOCATION SIMPLE: LEFT EAR
LOCATION SIMPLE: LEFT FOREARM
LOCATION SIMPLE: LEFT POSTERIOR THIGH
LOCATION SIMPLE: UPPER BACK

## 2020-05-20 ASSESSMENT — LOCATION DETAILED DESCRIPTION DERM
LOCATION DETAILED: RIGHT PROXIMAL LATERAL POSTERIOR THIGH
LOCATION DETAILED: LEFT PROXIMAL DORSAL FOREARM
LOCATION DETAILED: LEFT SUPERIOR CRUS OF ANTIHELIX
LOCATION DETAILED: RIGHT MEDIAL TRAPEZIAL NECK
LOCATION DETAILED: LEFT SUPERIOR LATERAL MALAR CHEEK
LOCATION DETAILED: SUPERIOR THORACIC SPINE
LOCATION DETAILED: RIGHT PROXIMAL DORSAL FOREARM
LOCATION DETAILED: INFERIOR THORACIC SPINE
LOCATION DETAILED: RIGHT DISTAL POSTERIOR THIGH
LOCATION DETAILED: RIGHT PROXIMAL RADIAL DORSAL FOREARM
LOCATION DETAILED: LEFT CAVUM CONCHA
LOCATION DETAILED: LEFT CENTRAL MALAR CHEEK
LOCATION DETAILED: RIGHT SUPERIOR UPPER BACK
LOCATION DETAILED: RIGHT SUPERIOR MEDIAL MIDBACK
LOCATION DETAILED: LEFT DISTAL POSTERIOR THIGH
LOCATION DETAILED: LEFT INFERIOR HELIX
LOCATION DETAILED: LEFT MID TEMPLE

## 2020-09-10 ENCOUNTER — HOSPITAL ENCOUNTER (OUTPATIENT)
Dept: LAB | Facility: MEDICAL CENTER | Age: 75
End: 2020-09-10
Attending: INTERNAL MEDICINE
Payer: MEDICARE

## 2020-09-10 DIAGNOSIS — I10 ESSENTIAL HYPERTENSION, BENIGN: ICD-10-CM

## 2020-09-10 DIAGNOSIS — E78.5 HYPERLIPIDEMIA, UNSPECIFIED HYPERLIPIDEMIA TYPE: ICD-10-CM

## 2020-09-10 LAB
ANION GAP SERPL CALC-SCNC: 10 MMOL/L (ref 7–16)
BUN SERPL-MCNC: 14 MG/DL (ref 8–22)
CALCIUM SERPL-MCNC: 9.4 MG/DL (ref 8.5–10.5)
CHLORIDE SERPL-SCNC: 102 MMOL/L (ref 96–112)
CHOLEST SERPL-MCNC: 165 MG/DL (ref 100–199)
CO2 SERPL-SCNC: 26 MMOL/L (ref 20–33)
CREAT SERPL-MCNC: 0.89 MG/DL (ref 0.5–1.4)
FASTING STATUS PATIENT QL REPORTED: NORMAL
GLUCOSE SERPL-MCNC: 96 MG/DL (ref 65–99)
HDLC SERPL-MCNC: 103 MG/DL
LDLC SERPL CALC-MCNC: 51 MG/DL
POTASSIUM SERPL-SCNC: 4.8 MMOL/L (ref 3.6–5.5)
SODIUM SERPL-SCNC: 138 MMOL/L (ref 135–145)
TRIGL SERPL-MCNC: 56 MG/DL (ref 0–149)

## 2020-09-10 PROCEDURE — 36415 COLL VENOUS BLD VENIPUNCTURE: CPT

## 2020-09-10 PROCEDURE — 80048 BASIC METABOLIC PNL TOTAL CA: CPT

## 2020-09-10 PROCEDURE — 80061 LIPID PANEL: CPT

## 2020-09-14 ENCOUNTER — OFFICE VISIT (OUTPATIENT)
Dept: MEDICAL GROUP | Facility: MEDICAL CENTER | Age: 75
End: 2020-09-14
Payer: MEDICARE

## 2020-09-14 VITALS
HEIGHT: 70 IN | SYSTOLIC BLOOD PRESSURE: 122 MMHG | DIASTOLIC BLOOD PRESSURE: 76 MMHG | HEART RATE: 61 BPM | RESPIRATION RATE: 16 BRPM | OXYGEN SATURATION: 100 % | WEIGHT: 176 LBS | BODY MASS INDEX: 25.2 KG/M2 | TEMPERATURE: 98.7 F

## 2020-09-14 DIAGNOSIS — R73.9 HYPERGLYCEMIA: ICD-10-CM

## 2020-09-14 DIAGNOSIS — E78.5 HYPERLIPIDEMIA, UNSPECIFIED HYPERLIPIDEMIA TYPE: ICD-10-CM

## 2020-09-14 DIAGNOSIS — Z86.73 HISTORY OF CVA (CEREBROVASCULAR ACCIDENT): ICD-10-CM

## 2020-09-14 DIAGNOSIS — I10 ESSENTIAL HYPERTENSION, BENIGN: ICD-10-CM

## 2020-09-14 DIAGNOSIS — I48.0 PAF (PAROXYSMAL ATRIAL FIBRILLATION) (HCC): ICD-10-CM

## 2020-09-14 DIAGNOSIS — Z12.11 SCREEN FOR COLON CANCER: ICD-10-CM

## 2020-09-14 PROCEDURE — 99214 OFFICE O/P EST MOD 30 MIN: CPT | Performed by: INTERNAL MEDICINE

## 2020-09-14 RX ORDER — AMOXICILLIN 500 MG/1
CAPSULE ORAL
COMMUNITY
Start: 2020-08-07 | End: 2021-07-15

## 2020-09-14 NOTE — ASSESSMENT & PLAN NOTE
He continues atorvastatin for hyperlipidemia and is trying to follow appropriate diet.  Most recent cholesterol is 165, triglycerides 56, , LDL 51.

## 2020-09-14 NOTE — ASSESSMENT & PLAN NOTE
Blood sugar was mildly elevated at last visit, current glucose is 96.  He tries to avoid eating concentrated sweets.

## 2020-09-14 NOTE — ASSESSMENT & PLAN NOTE
Paroxysmal atrial fibrillation for which he is anticoagulated with Xarelto.  He denies any palpitations.  He continues metoprolol.

## 2020-09-14 NOTE — PROGRESS NOTES
Subjective:     Chief Complaint   Patient presents with   • Lab Results     Babar Willett is a 75 y.o. male here today for Follow-up of paroxysmal atrial fibrillation, hypertension, hyperlipidemia, hyperglycemia.  Also he is due for a stool fit test.    History of CVA (cerebrovascular accident)  Prior CVA.  No new neurologic signs or symptoms.    PAF (paroxysmal atrial fibrillation)  Paroxysmal atrial fibrillation for which he is anticoagulated with Xarelto.  He denies any palpitations.  He continues metoprolol.    Essential hypertension, benign  Hypertension is well controlled with ramipril.  He denies lightheadedness.    Hyperlipidemia  He continues atorvastatin for hyperlipidemia and is trying to follow appropriate diet.  Most recent cholesterol is 165, triglycerides 56, , LDL 51.    Screen for colon cancer  He is due for repeat stool fit test.  He denies recent change in bowel habits.    Hyperglycemia  Blood sugar was mildly elevated at last visit, current glucose is 96.  He tries to avoid eating concentrated sweets.       Diagnoses of Essential hypertension, benign, Hyperlipidemia, unspecified hyperlipidemia type, Screen for colon cancer, Hyperglycemia, PAF (paroxysmal atrial fibrillation) (HCC), and History of CVA (cerebrovascular accident) were pertinent to this visit.    Allergies: Patient has no known allergies.  Current medicines (including changes today)  Current Outpatient Medications   Medication Sig Dispense Refill   • acetaminophen (TYLENOL) 500 MG Tab Take 500-1,000 mg by mouth every 6 hours as needed.     • Glucos-Chondroit-Hyaluron-MSM (GLUCOSAMINE CHONDROITIN JOINT PO) Take  by mouth.     • metoprolol SR (TOPROL XL) 50 MG TABLET SR 24 HR Take 50 mg by mouth every day.     • ramipril (ALTACE) 2.5 MG Cap Take 2.5 mg by mouth every day.     • atorvastatin (LIPITOR) 10 MG TABS Take 10 mg by mouth every evening.     • rivaroxaban (XARELTO) 20 MG TABS tablet Take 20 mg by mouth with dinner.   "   • SHINGRIX 50 MCG Recon Susp TO BE ADMINISTERED BY PHARMACIST FOR IMMUNIZATION  0     No current facility-administered medications for this visit.        He  has a past medical history of Allergy, History of CVA (cerebrovascular accident) (2/20/2015), Hyperglycemia (9/3/2019), Medicare annual wellness visit, subsequent (2/20/2015), PAF (paroxysmal atrial fibrillation) (HCC) (2/20/2015), Stroke (HCC), and TIA (transient ischemic attack).  Health Maintenance: He will get the flu vaccine in the next month.  ROS    Patient denies significant change in strength, weight or appetite.  No significant lightheadedness or headaches.  No change in vision, hearing, or swallowing.  No new dyspnea, coughing, chest pain, or palpitations.  No indigestion, abdominal pain, or change in bowel habits.  No change in urinating.  No new ankle swelling.       Objective:     PE:  /76 (BP Location: Left arm)   Pulse 61   Temp 37.1 °C (98.7 °F)   Resp 16   Ht 1.778 m (5' 10\")   Wt 79.8 kg (176 lb)   SpO2 100%   BMI 25.25 kg/m²    Neck is supple without significant lymphadenopathy or masses.  Lungs are clear with normal breath sounds without wheezes or rales .  Cardiovascular: peripheral circulation is satisfactory, heart sounds are unchanged and unremarkable.  Heart rhythm is regular.  Abdomen is soft, without masses or tenderness, with normal bowel sounds.  Extremities are without significant edema, cyanosis or deformity.      Assessment and Plan:   The following treatment plan was discussed  1. Essential hypertension, benign  Basic Metabolic Panel    Continue ramipril and low-salt diet.  Report any significant lightheadedness.   2. Hyperlipidemia, unspecified hyperlipidemia type  Lipid Profile    Continue appropriate diet and atorvastatin.   3. Screen for colon cancer  OCCULT BLOOD FECES IMMUNOASSAY    I gave him a stool fit test kit.   4. Hyperglycemia      Continue avoidance of concentrated sweets.   5. PAF (paroxysmal " atrial fibrillation) ([facility])      Follow-up with cardiology.  Report any significant palpitations or lightheadedness.   6. History of CVA (cerebrovascular accident)      If he notes any neurologic changes, he is to go to emergency room.       Followup: Return in about 6 months (around 3/14/2021), or health  wellness, for Long.

## 2020-09-17 ENCOUNTER — HOSPITAL ENCOUNTER (OUTPATIENT)
Facility: MEDICAL CENTER | Age: 75
End: 2020-09-17
Attending: INTERNAL MEDICINE
Payer: MEDICARE

## 2020-09-17 PROCEDURE — 82274 ASSAY TEST FOR BLOOD FECAL: CPT

## 2020-09-23 DIAGNOSIS — Z12.11 SCREEN FOR COLON CANCER: ICD-10-CM

## 2020-09-24 LAB — HEMOCCULT STL QL IA: NEGATIVE

## 2021-01-15 DIAGNOSIS — Z23 NEED FOR VACCINATION: ICD-10-CM

## 2021-03-17 ENCOUNTER — HOSPITAL ENCOUNTER (OUTPATIENT)
Dept: LAB | Facility: MEDICAL CENTER | Age: 76
End: 2021-03-17
Attending: INTERNAL MEDICINE
Payer: MEDICARE

## 2021-03-17 DIAGNOSIS — I10 ESSENTIAL HYPERTENSION, BENIGN: ICD-10-CM

## 2021-03-17 DIAGNOSIS — E78.5 HYPERLIPIDEMIA, UNSPECIFIED HYPERLIPIDEMIA TYPE: ICD-10-CM

## 2021-03-17 LAB
ANION GAP SERPL CALC-SCNC: 6 MMOL/L (ref 7–16)
BUN SERPL-MCNC: 15 MG/DL (ref 8–22)
CALCIUM SERPL-MCNC: 9.6 MG/DL (ref 8.5–10.5)
CHLORIDE SERPL-SCNC: 104 MMOL/L (ref 96–112)
CHOLEST SERPL-MCNC: 169 MG/DL (ref 100–199)
CO2 SERPL-SCNC: 28 MMOL/L (ref 20–33)
CREAT SERPL-MCNC: 0.84 MG/DL (ref 0.5–1.4)
FASTING STATUS PATIENT QL REPORTED: NORMAL
GLUCOSE SERPL-MCNC: 105 MG/DL (ref 65–99)
HDLC SERPL-MCNC: 95 MG/DL
LDLC SERPL CALC-MCNC: 64 MG/DL
POTASSIUM SERPL-SCNC: 4.7 MMOL/L (ref 3.6–5.5)
SODIUM SERPL-SCNC: 138 MMOL/L (ref 135–145)
TRIGL SERPL-MCNC: 50 MG/DL (ref 0–149)

## 2021-03-17 PROCEDURE — 36415 COLL VENOUS BLD VENIPUNCTURE: CPT

## 2021-03-17 PROCEDURE — 80061 LIPID PANEL: CPT

## 2021-03-17 PROCEDURE — 80048 BASIC METABOLIC PNL TOTAL CA: CPT

## 2021-03-23 ENCOUNTER — OFFICE VISIT (OUTPATIENT)
Dept: MEDICAL GROUP | Facility: MEDICAL CENTER | Age: 76
End: 2021-03-23
Payer: MEDICARE

## 2021-03-23 VITALS
OXYGEN SATURATION: 96 % | DIASTOLIC BLOOD PRESSURE: 72 MMHG | HEART RATE: 55 BPM | HEIGHT: 73 IN | RESPIRATION RATE: 16 BRPM | WEIGHT: 178 LBS | SYSTOLIC BLOOD PRESSURE: 112 MMHG | TEMPERATURE: 97.6 F | BODY MASS INDEX: 23.59 KG/M2

## 2021-03-23 DIAGNOSIS — R05.9 COUGH: ICD-10-CM

## 2021-03-23 DIAGNOSIS — I10 ESSENTIAL HYPERTENSION, BENIGN: ICD-10-CM

## 2021-03-23 DIAGNOSIS — E78.5 HYPERLIPIDEMIA, UNSPECIFIED HYPERLIPIDEMIA TYPE: ICD-10-CM

## 2021-03-23 DIAGNOSIS — R73.9 HYPERGLYCEMIA: ICD-10-CM

## 2021-03-23 DIAGNOSIS — I48.0 PAF (PAROXYSMAL ATRIAL FIBRILLATION) (HCC): ICD-10-CM

## 2021-03-23 DIAGNOSIS — Z00.00 MEDICARE ANNUAL WELLNESS VISIT, SUBSEQUENT: ICD-10-CM

## 2021-03-23 DIAGNOSIS — Z86.73 HISTORY OF CVA (CEREBROVASCULAR ACCIDENT): ICD-10-CM

## 2021-03-23 PROCEDURE — G0439 PPPS, SUBSEQ VISIT: HCPCS | Performed by: INTERNAL MEDICINE

## 2021-03-23 ASSESSMENT — PAIN SCALES - GENERAL: PAINLEVEL: NO PAIN

## 2021-03-23 ASSESSMENT — ACTIVITIES OF DAILY LIVING (ADL): BATHING_REQUIRES_ASSISTANCE: 0

## 2021-03-23 ASSESSMENT — ENCOUNTER SYMPTOMS: GENERAL WELL-BEING: GOOD

## 2021-03-23 ASSESSMENT — PATIENT HEALTH QUESTIONNAIRE - PHQ9: CLINICAL INTERPRETATION OF PHQ2 SCORE: 0

## 2021-03-23 NOTE — ASSESSMENT & PLAN NOTE
Hypertension is under good control with a low-salt diet and small dose of ramipril and metoprolol.  He denies significant lightheadedness.

## 2021-03-23 NOTE — ASSESSMENT & PLAN NOTE
Blood sugar again is mildly elevated at 105.  He does not eat sweets significantly.  He was encouraged to avoid concentrated sweets especially on an empty stomach.

## 2021-03-23 NOTE — ASSESSMENT & PLAN NOTE
He gets episodes of paroxysmal atrial fibrillation.  These seem to occur more often when he is somewhat dehydrated he thinks.  He is followed for this by Dr. Velez.  He denies significant lightheadedness or chest pain or dyspnea with these episodes.

## 2021-03-23 NOTE — ASSESSMENT & PLAN NOTE
He reports that for about the last year he has noticed a mild intermittent chronic cough.  He has been on ramipril for many years.  He denies any dyspnea or wheezing.  The cough seems to be coming from his chest rather than upper respiratory area.  We will check a chest x-ray.  The cough as noted is somewhat intermittent.  He is not particularly aware of aggravating or alleviating factors.

## 2021-03-23 NOTE — PROGRESS NOTES
Chief Complaint   Patient presents with   • Annual Wellness Visit     cough x1 year   • Hypertension         HPI:  Babar is a 76 y.o. here for Medicare Annual Wellness Visit        Patient Active Problem List    Diagnosis Date Noted   • Hyperglycemia 09/03/2019   • Mass of right chest wall 10/13/2017   • Screen for colon cancer 08/30/2017   • Hyperlipidemia 02/22/2016   • Essential hypertension, benign 08/21/2015   • History of CVA (cerebrovascular accident) 02/20/2015   • PAF (paroxysmal atrial fibrillation) (HCC) 02/20/2015   • Medicare annual wellness visit, subsequent 02/20/2015       Current Outpatient Medications   Medication Sig Dispense Refill   • Glucos-Chondroit-Hyaluron-MSM (GLUCOSAMINE CHONDROITIN JOINT PO) Take  by mouth.     • metoprolol SR (TOPROL XL) 50 MG TABLET SR 24 HR Take 50 mg by mouth every day.     • ramipril (ALTACE) 2.5 MG Cap Take 2.5 mg by mouth every day.     • atorvastatin (LIPITOR) 10 MG TABS Take 10 mg by mouth every evening.     • rivaroxaban (XARELTO) 20 MG TABS tablet Take 20 mg by mouth with dinner.     • amoxicillin (AMOXIL) 500 MG Cap TAKE 4 CAPSULES BY MOUTH 1 HOUR PRIOR TO DENTAL APPOINTMENT     • SHINGRIX 50 MCG Recon Susp TO BE ADMINISTERED BY PHARMACIST FOR IMMUNIZATION  0   • acetaminophen (TYLENOL) 500 MG Tab Take 500-1,000 mg by mouth every 6 hours as needed.       No current facility-administered medications for this visit.        Patient is taking medications as noted in medication list.  Current supplements as per medication list.     Allergies: Patient has no known allergies.    Current social contact/activities: friends, work    Is patient current with immunizations? Yes.    He  reports that he quit smoking about 52 years ago. His smoking use included cigarettes. He has a 1.00 pack-year smoking history. He has never used smokeless tobacco. He reports current alcohol use of about 6.0 oz of alcohol per week. He reports that he does not use drugs.  Counseling given:  Not Answered  Comment: Started smoking at age 22        DPA/Advanced directive: Patient has Advanced Directive on file.     ROS:    Gait: Uses no assistive device   Ostomy: No   Other tubes: No   Amputations: No   Chronic oxygen use No   Last eye exam 2021  Wears hearing aids: Yes   : Denies any urinary leakage during the last 6 months      Screening:        Depression Screening    Little interest or pleasure in doing things?  0 - not at all  Feeling down, depressed, or hopeless? 0 - not at all  Patient Health Questionnaire Score: 0    If depressive symptoms identified deferred to follow up visit unless specifically addressed in assessment and plan.    Interpretation of PHQ-9 Total Score   Score Severity   1-4 No Depression   5-9 Mild Depression   10-14 Moderate Depression   15-19 Moderately Severe Depression   20-27 Severe Depression    Screening for Cognitive Impairment    Three Minute Recall (river, amira, finger)   /3    Montrell clock face with all 12 numbers and set the hands to show 10 past 11.  Yes    If cognitive concerns identified, deferred for follow up unless specifically addressed in assessment and plan.    Fall Risk Assessment    Has the patient had two or more falls in the last year or any fall with injury in the last year?  No  If fall risk identified, deferred for follow up unless specifically addressed in assessment and plan.    Safety Assessment    Throw rugs on floor.  No  Handrails on all stairs.  Yes  Good lighting in all hallways.  Yes  Difficulty hearing.  No  Patient counseled about all safety risks that were identified.    Functional Assessment ADLs    Are there any barriers preventing you from cooking for yourself or meeting nutritional needs?  No.    Are there any barriers preventing you from driving safely or obtaining transportation?  No.    Are there any barriers preventing you from using a telephone or calling for help?  No.    Are there any barriers preventing you from shopping?  No.     Are there any barriers preventing you from taking care of your own finances?  No.    Are there any barriers preventing you from managing your medications?  No.    Are there any barriers preventing you from showering, bathing or dressing yourself?  No.    Are you currently engaging in any exercise or physical activity?  Yes.     What is your perception of your health?  Good.    Health Maintenance Summary                COLON CANCER SCREENING ANNUAL FIT Next Due 2021      Done 2020 OCCULT BLOOD FECES IMMUNOASSAY     Patient has more history with this topic...    Annual Wellness Visit Next Due 3/24/2022      Done 3/23/2021      Patient has more history with this topic...    IMM DTaP/Tdap/Td Vaccine Next Due 2023      Done 2013 Imm Admin: Tdap Vaccine          Patient Care Team:  Binu Dave M.D. as PCP - General (Internal Medicine)  Chava Velez D.O. as Consulting Physician (Cardiology)  Deborah Alan O.D. as Consulting Physician (Optometry)  JONATHAN Darden. as Consulting Physician (Audiology)  Howard Mckeon M.D. as Consulting Physician (Orthopaedics)    Social History     Tobacco Use   • Smoking status: Former Smoker     Packs/day: 0.50     Years: 2.00     Pack years: 1.00     Types: Cigarettes     Quit date: 1969     Years since quittin.2   • Smokeless tobacco: Never Used   • Tobacco comment: Started smoking at age 22   Substance Use Topics   • Alcohol use: Yes     Alcohol/week: 6.0 oz     Types: 10 Glasses of wine per week   • Drug use: No     Family History   Problem Relation Age of Onset   • Heart Disease Father         Arteriosclerosis    • Heart Attack Father    • Arthritis Sister    • No Known Problems Brother    • Other Mother         Gluacoma   • Stroke Brother         TIA   • Prostate cancer Brother    • Heart Disease Sister         Pacemaker   • Other Sister         Cataract   • Other Brother         Down syndrome   • Suicide Attempts Brother    •  "Depression Brother    • Cancer Maternal Aunt         intestinal    • No Known Problems Daughter      He  has a past medical history of Allergy, History of CVA (cerebrovascular accident) (2/20/2015), Hyperglycemia (9/3/2019), Medicare annual wellness visit, subsequent (2/20/2015), PAF (paroxysmal atrial fibrillation) (HCC) (2/20/2015), Stroke (HCC), and TIA (transient ischemic attack).   Past Surgical History:   Procedure Laterality Date   • INGUINAL HERNIA REPAIR      Hernia Repair, Inguinal   • KNEE ARTHROSCOPY             Exam:     /72 (BP Location: Left arm, Patient Position: Sitting, BP Cuff Size: Adult)   Pulse (!) 55   Temp 36.4 °C (97.6 °F)   Resp 16   Ht 1.854 m (6' 1\")   Wt 80.7 kg (178 lb)   SpO2 96%  Body mass index is 23.48 kg/m².    Hearing fair.    Dentition good  Alert, oriented in no acute distress.  Eye contact is good, speech goal directed, affect calm.  Neck is supple and without significant lymphadenopathy.  Lungs are clear without rales or wheeze.  Cardiovascular peripheral circulation is satisfactory.  Heart sounds are unremarkable.  Heart rhythm is regular.  Abdomen is soft without masses or tenderness.  There are normal bowel sounds.  Rectal and genital exams were not performed.  Extremities are without significant edema, cyanosis, or deformity.  Brief neurologic exam is unremarkable with essentially normal sensation, strength, gait, and cerebellar functions.      Assessment and Plan. The following treatment and monitoring plan is recommended:    History of CVA (cerebrovascular accident)  Prior stroke with no new neurologic deficits.  He continues to be anticoagulated with Xarelto.    PAF (paroxysmal atrial fibrillation)  He gets episodes of paroxysmal atrial fibrillation.  These seem to occur more often when he is somewhat dehydrated he thinks.  He is followed for this by Dr. Velez.  He denies significant lightheadedness or chest pain or dyspnea with these " episodes.    Essential hypertension, benign  Hypertension is under good control with a low-salt diet and small dose of ramipril and metoprolol.  He denies significant lightheadedness.    Hyperlipidemia  He continues atorvastatin for his hyperlipidemia.  Most recent cholesterol is 169, triglycerides 50, HDL 95, LDL 64.  He follows appropriate diet.  He is not overweight.    Cough  He reports that for about the last year he has noticed a mild intermittent chronic cough.  He has been on ramipril for many years.  He denies any dyspnea or wheezing.  The cough seems to be coming from his chest rather than upper respiratory area.  We will check a chest x-ray.  The cough as noted is somewhat intermittent.  He is not particularly aware of aggravating or alleviating factors.    Hyperglycemia  Blood sugar again is mildly elevated at 105.  He does not eat sweets significantly.  He was encouraged to avoid concentrated sweets especially on an empty stomach.          Services suggested: No services needed at this time  Health Care Screening recommendations as per orders if indicated.  Referrals offered: PT/OT/Nutrition counseling/Behavioral Health/Smoking cessation as per orders if indicated.    Discussion today about general wellness and lifestyle habits:    · Prevent falls and reduce trip hazards; Cautioned about securing or removing rugs.  · Have a working fire alarm and carbon monoxide detector;   · Engage in regular physical activity and social activities       Follow-up: 6 months or as needed sooner.

## 2021-03-23 NOTE — ASSESSMENT & PLAN NOTE
He continues atorvastatin for his hyperlipidemia.  Most recent cholesterol is 169, triglycerides 50, HDL 95, LDL 64.  He follows appropriate diet.  He is not overweight.

## 2021-05-10 NOTE — ASSESSMENT & PLAN NOTE
He continues Lipitor.  He has not yet had lab tests drawn.   Hyperlipidemia is stable. Follow low cholesterol diet.  Continue current treatment.  -takes lipitor, omega-3 and fenofibrate  lipd profile noted

## 2021-05-25 ENCOUNTER — APPOINTMENT (RX ONLY)
Dept: URBAN - METROPOLITAN AREA CLINIC 4 | Facility: CLINIC | Age: 76
Setting detail: DERMATOLOGY
End: 2021-05-25

## 2021-05-25 DIAGNOSIS — D22 MELANOCYTIC NEVI: ICD-10-CM

## 2021-05-25 DIAGNOSIS — L81.4 OTHER MELANIN HYPERPIGMENTATION: ICD-10-CM

## 2021-05-25 DIAGNOSIS — Z85.828 PERSONAL HISTORY OF OTHER MALIGNANT NEOPLASM OF SKIN: ICD-10-CM | Status: RESOLVED

## 2021-05-25 DIAGNOSIS — D18.0 HEMANGIOMA: ICD-10-CM

## 2021-05-25 DIAGNOSIS — L82.0 INFLAMED SEBORRHEIC KERATOSIS: ICD-10-CM

## 2021-05-25 DIAGNOSIS — L82.1 OTHER SEBORRHEIC KERATOSIS: ICD-10-CM

## 2021-05-25 PROBLEM — D18.01 HEMANGIOMA OF SKIN AND SUBCUTANEOUS TISSUE: Status: ACTIVE | Noted: 2021-05-25

## 2021-05-25 PROBLEM — D22.5 MELANOCYTIC NEVI OF TRUNK: Status: ACTIVE | Noted: 2021-05-25

## 2021-05-25 PROCEDURE — 99213 OFFICE O/P EST LOW 20 MIN: CPT

## 2021-05-25 PROCEDURE — ? DIAGNOSIS COMMENT

## 2021-05-25 PROCEDURE — ? LIQUID NITROGEN (COSMETIC)

## 2021-05-25 ASSESSMENT — LOCATION DETAILED DESCRIPTION DERM
LOCATION DETAILED: RIGHT PROXIMAL DORSAL FOREARM
LOCATION DETAILED: INFERIOR THORACIC SPINE
LOCATION DETAILED: SUPERIOR THORACIC SPINE
LOCATION DETAILED: RIGHT SUPERIOR UPPER BACK
LOCATION DETAILED: RIGHT PROXIMAL LATERAL POSTERIOR THIGH
LOCATION DETAILED: RIGHT SUPERIOR MEDIAL MIDBACK
LOCATION DETAILED: LEFT DISTAL POSTERIOR THIGH
LOCATION DETAILED: RIGHT DISTAL POSTERIOR THIGH
LOCATION DETAILED: RIGHT PROXIMAL RADIAL DORSAL FOREARM
LOCATION DETAILED: RIGHT INFERIOR LATERAL FOREHEAD
LOCATION DETAILED: LEFT PROXIMAL DORSAL FOREARM

## 2021-05-25 ASSESSMENT — LOCATION SIMPLE DESCRIPTION DERM
LOCATION SIMPLE: UPPER BACK
LOCATION SIMPLE: RIGHT LOWER BACK
LOCATION SIMPLE: RIGHT POSTERIOR THIGH
LOCATION SIMPLE: RIGHT UPPER BACK
LOCATION SIMPLE: RIGHT FOREHEAD
LOCATION SIMPLE: RIGHT FOREARM
LOCATION SIMPLE: LEFT FOREARM
LOCATION SIMPLE: LEFT POSTERIOR THIGH

## 2021-05-25 ASSESSMENT — LOCATION ZONE DERM
LOCATION ZONE: ARM
LOCATION ZONE: TRUNK
LOCATION ZONE: FACE
LOCATION ZONE: LEG

## 2021-05-25 NOTE — PROCEDURE: DIAGNOSIS COMMENT
Comment: Large lesion may take more than one treatment
Detail Level: Detailed
Render Risk Assessment In Note?: no

## 2021-05-25 NOTE — PROCEDURE: LIQUID NITROGEN (COSMETIC)
Consent: The patient's consent was obtained including but not limited to risks of crusting, scabbing, blistering, scarring, darker or lighter pigmentary change, recurrence, incomplete removal and infection. The patient understands that the procedure is cosmetic in nature and is not covered by insurance.
Billing Information: Bill by Static Price
Render Post-Care Instructions In Note?: no
Post-Care Instructions: I reviewed with the patient in detail post-care instructions. Patient is to wear sunprotection, and avoid picking at any of the treated lesions. Pt may apply Vaseline to crusted or scabbing areas.
Detail Level: Detailed
Price (Use Numbers Only, No Special Characters Or $): 0

## 2021-07-09 ENCOUNTER — HOSPITAL ENCOUNTER (OUTPATIENT)
Dept: LAB | Facility: MEDICAL CENTER | Age: 76
End: 2021-07-09
Attending: INTERNAL MEDICINE
Payer: MEDICARE

## 2021-07-09 DIAGNOSIS — E78.5 HYPERLIPIDEMIA, UNSPECIFIED HYPERLIPIDEMIA TYPE: ICD-10-CM

## 2021-07-09 DIAGNOSIS — I10 ESSENTIAL HYPERTENSION, BENIGN: ICD-10-CM

## 2021-07-09 LAB
ANION GAP SERPL CALC-SCNC: 6 MMOL/L (ref 7–16)
BUN SERPL-MCNC: 14 MG/DL (ref 8–22)
CALCIUM SERPL-MCNC: 9 MG/DL (ref 8.5–10.5)
CHLORIDE SERPL-SCNC: 102 MMOL/L (ref 96–112)
CHOLEST SERPL-MCNC: 163 MG/DL (ref 100–199)
CO2 SERPL-SCNC: 28 MMOL/L (ref 20–33)
CREAT SERPL-MCNC: 0.92 MG/DL (ref 0.5–1.4)
GLUCOSE SERPL-MCNC: 97 MG/DL (ref 65–99)
HDLC SERPL-MCNC: 94 MG/DL
LDLC SERPL CALC-MCNC: 58 MG/DL
POTASSIUM SERPL-SCNC: 4.6 MMOL/L (ref 3.6–5.5)
SODIUM SERPL-SCNC: 136 MMOL/L (ref 135–145)
TRIGL SERPL-MCNC: 53 MG/DL (ref 0–149)

## 2021-07-09 PROCEDURE — 80048 BASIC METABOLIC PNL TOTAL CA: CPT

## 2021-07-09 PROCEDURE — 36415 COLL VENOUS BLD VENIPUNCTURE: CPT

## 2021-07-09 PROCEDURE — 80061 LIPID PANEL: CPT

## 2021-07-15 ENCOUNTER — OFFICE VISIT (OUTPATIENT)
Dept: MEDICAL GROUP | Facility: PHYSICIAN GROUP | Age: 76
End: 2021-07-15
Payer: MEDICARE

## 2021-07-15 VITALS
BODY MASS INDEX: 24.67 KG/M2 | OXYGEN SATURATION: 96 % | RESPIRATION RATE: 16 BRPM | SYSTOLIC BLOOD PRESSURE: 104 MMHG | DIASTOLIC BLOOD PRESSURE: 68 MMHG | HEIGHT: 71 IN | TEMPERATURE: 98.2 F | HEART RATE: 64 BPM | WEIGHT: 176.2 LBS

## 2021-07-15 DIAGNOSIS — D68.69 SECONDARY HYPERCOAGULABILITY DISORDER (HCC): ICD-10-CM

## 2021-07-15 DIAGNOSIS — Z76.89 ENCOUNTER TO ESTABLISH CARE WITH NEW DOCTOR: ICD-10-CM

## 2021-07-15 DIAGNOSIS — I10 ESSENTIAL HYPERTENSION, BENIGN: ICD-10-CM

## 2021-07-15 DIAGNOSIS — Z12.11 COLON CANCER SCREENING: ICD-10-CM

## 2021-07-15 DIAGNOSIS — Z86.73 HISTORY OF CVA (CEREBROVASCULAR ACCIDENT): ICD-10-CM

## 2021-07-15 DIAGNOSIS — H53.47 HEMIANOPSIA: ICD-10-CM

## 2021-07-15 DIAGNOSIS — E78.5 HYPERLIPIDEMIA, UNSPECIFIED HYPERLIPIDEMIA TYPE: ICD-10-CM

## 2021-07-15 DIAGNOSIS — Z12.11 SCREEN FOR COLON CANCER: ICD-10-CM

## 2021-07-15 PROBLEM — R22.2 MASS OF RIGHT CHEST WALL: Status: RESOLVED | Noted: 2017-10-13 | Resolved: 2021-07-15

## 2021-07-15 PROBLEM — R73.9 HYPERGLYCEMIA: Status: RESOLVED | Noted: 2019-09-03 | Resolved: 2021-07-15

## 2021-07-15 PROCEDURE — 99203 OFFICE O/P NEW LOW 30 MIN: CPT | Performed by: FAMILY MEDICINE

## 2021-07-15 NOTE — ASSESSMENT & PLAN NOTE
This is a chronic problem.  Patient's only been getting the stool occult blood test done.  I talked to him about better alternatives and a Cologuard test was ordered.  He will be notified of the results.

## 2021-07-15 NOTE — ASSESSMENT & PLAN NOTE
This is a chronic problem.  His blood pressures well controlled on current medications.  Does see cardiologist on regular basis.

## 2021-07-15 NOTE — PROGRESS NOTES
Subjective:     CC: Patient is here to establish care and go over his medical history.    HPI:   Babar presents today with following medical concerns:    Encounter to establish care with new doctor  Patient is here today to establish care.  He did have labs done with a wellness exam in March.  States usually comes in about twice a year.  He has no problems on today's visit.  He does have some significant medical history.  On review of his chart he has is revealed that he has not had liver function test, urinalysis or PSA done for many years.  Patient would like to do those with his next scheduled labs after the first of the year.    He does have hypertension which is well controlled and a history of a stroke secondary to PFO that was corrected.  He has visual field loss from his stroke in 2015.    Hemianopsia  This is a chronic problem as a residual from a stroke in 2015.    Secondary hypercoagulability disorder (HCC)  This is a chronic problem.  Patient is on Xarelto to prevent blood clots from proximal atrial fibrillation.    Screen for colon cancer  This is a chronic problem.  Patient's only been getting the stool occult blood test done.  I talked to him about better alternatives and a Cologuard test was ordered.  He will be notified of the results.    Hyperlipidemia  This is a chronic problem.  Patient is on atorvastatin and tolerating it well.    Essential hypertension, benign  This is a chronic problem.  His blood pressures well controlled on current medications.  Does see cardiologist on regular basis.    History of CVA (cerebrovascular accident)  This is a historical issue.  Continue to monitor and continue preventative treatment.      Past Medical History:   Diagnosis Date   • Allergy    • History of CVA (cerebrovascular accident) 2/20/2015   • Hyperglycemia 9/3/2019   • Medicare annual wellness visit, subsequent 2/20/2015   • PAF (paroxysmal atrial fibrillation) (HCC) 2/20/2015   • Stroke (HCC)    • TIA  "(transient ischemic attack)        Social History     Tobacco Use   • Smoking status: Former Smoker     Packs/day: 0.50     Years: 2.00     Pack years: 1.00     Types: Cigarettes     Quit date: 1969     Years since quittin.5   • Smokeless tobacco: Never Used   • Tobacco comment: Started smoking at age 22   Vaping Use   • Vaping Use: Never used   Substance Use Topics   • Alcohol use: Yes     Alcohol/week: 1.2 oz     Types: 2 Glasses of wine per week     Comment: 2 glass of wine/night   • Drug use: No       Current Outpatient Medications Ordered in Epic   Medication Sig Dispense Refill   • acetaminophen (TYLENOL) 500 MG Tab Take 500-1,000 mg by mouth every 6 hours as needed.     • Glucos-Chondroit-Hyaluron-MSM (GLUCOSAMINE CHONDROITIN JOINT PO) Take  by mouth.     • metoprolol SR (TOPROL XL) 50 MG TABLET SR 24 HR Take 50 mg by mouth every day.     • ramipril (ALTACE) 2.5 MG Cap Take 2.5 mg by mouth every day.     • atorvastatin (LIPITOR) 10 MG TABS Take 10 mg by mouth every evening.     • rivaroxaban (XARELTO) 20 MG TABS tablet Take 20 mg by mouth with dinner.       No current Epic-ordered facility-administered medications on file.       Allergies:  Patient has no known allergies.    Health Maintenance: Completed    ROS:  Gen: no fevers/chills, no changes in weight  Eyes: no changes in vision  ENT: no sore throat, no hearing loss, no bloody nose  Pulm: no sob, no cough  CV: no chest pain, no palpitations  GI: no nausea/vomiting, no diarrhea  : no dysuria  MSk: no myalgias  Skin: no rash  Neuro: no headaches, no numbness/tingling  Heme/Lymph: no easy bruising      Objective:       Exam:  /68 (BP Location: Left arm, Patient Position: Sitting, BP Cuff Size: Adult)   Pulse 64   Temp 36.8 °C (98.2 °F) (Temporal)   Resp 16   Ht 1.803 m (5' 11\")   Wt 79.9 kg (176 lb 3.2 oz)   SpO2 96%   BMI 24.57 kg/m²  Body mass index is 24.57 kg/m².    Gen: Alert and oriented, No apparent distress.  Lungs: Normal " effort, CTA bilaterally, no wheezes, rhonchi, or rales  CV: Regular rate and rhythm. No murmurs, rubs, or gallops.  Ext: No clubbing, cyanosis, edema.        Labs: Previous labs reviewed.    Assessment & Plan:     76 y.o. male with the following -     1. Secondary hypercoagulability disorder (HCC)  This is a chronic condition.  Continue on anticoagulant treatment.    2. Colon cancer screening  This is a chronic screening issue.  Cologuard ordered.  - COLOGUARD (FIT DNA)      4. Hyperlipidemia, unspecified hyperlipidemia type  This is a chronic problem.  Continue on a statin.    5. Essential hypertension, benign  This is a chronic problem.  Continue on his blood pressure medication and continue follow-up with cardiology.    6. History of CVA (cerebrovascular accident)  This is a chronic issue.  Continue on preventative treatment.    7. Encounter to establish care with new doctor  Patient establish care with me today.  After the first the year we do annual lab work including PSA ,liver test and urinalysis.    8. Hemianopsia  This is a chronic problem.  Continue to monitor.      Return in about 7 months (around 2/15/2022) for Long, f/view labs.  35 minutes spent with the patient.  Please note that this dictation was created using voice recognition software. I have made every reasonable attempt to correct obvious errors, but I expect that there are errors of grammar and possibly content that I did not discover before finalizing the note.

## 2021-07-15 NOTE — ASSESSMENT & PLAN NOTE
Patient is here today to establish care.  He did have labs done with a wellness exam in March.  States usually comes in about twice a year.  He has no problems on today's visit.  He does have some significant medical history.  On review of his chart he has is revealed that he has not had liver function test, urinalysis or PSA done for many years.  Patient would like to do those with his next scheduled labs after the first of the year.    He does have hypertension which is well controlled and a history of a stroke secondary to PFO that was corrected.  He has visual field loss from his stroke in 2015.

## 2021-07-15 NOTE — ASSESSMENT & PLAN NOTE
This is a chronic problem.  Patient is on Xarelto to prevent blood clots from proximal atrial fibrillation.

## 2021-08-05 DIAGNOSIS — R19.5 POSITIVE COLORECTAL CANCER SCREENING USING COLOGUARD TEST: ICD-10-CM

## 2021-08-25 ENCOUNTER — APPOINTMENT (RX ONLY)
Dept: URBAN - METROPOLITAN AREA CLINIC 4 | Facility: CLINIC | Age: 76
Setting detail: DERMATOLOGY
End: 2021-08-25

## 2021-08-25 DIAGNOSIS — L81.4 OTHER MELANIN HYPERPIGMENTATION: ICD-10-CM

## 2021-08-25 DIAGNOSIS — L82.1 OTHER SEBORRHEIC KERATOSIS: ICD-10-CM

## 2021-08-25 PROCEDURE — 99212 OFFICE O/P EST SF 10 MIN: CPT

## 2021-08-25 PROCEDURE — ? LIQUID NITROGEN (COSMETIC)

## 2021-08-25 ASSESSMENT — LOCATION SIMPLE DESCRIPTION DERM
LOCATION SIMPLE: RIGHT FOREHEAD
LOCATION SIMPLE: RIGHT UPPER BACK
LOCATION SIMPLE: SCALP
LOCATION SIMPLE: POSTERIOR NECK
LOCATION SIMPLE: UPPER BACK

## 2021-08-25 ASSESSMENT — LOCATION ZONE DERM
LOCATION ZONE: TRUNK
LOCATION ZONE: NECK
LOCATION ZONE: FACE
LOCATION ZONE: SCALP

## 2021-08-25 ASSESSMENT — LOCATION DETAILED DESCRIPTION DERM
LOCATION DETAILED: RIGHT INFERIOR LATERAL FOREHEAD
LOCATION DETAILED: INFERIOR THORACIC SPINE
LOCATION DETAILED: RIGHT MEDIAL TRAPEZIAL NECK
LOCATION DETAILED: RIGHT CENTRAL FRONTAL SCALP
LOCATION DETAILED: RIGHT INFERIOR MEDIAL UPPER BACK

## 2021-09-07 ENCOUNTER — TELEPHONE (OUTPATIENT)
Dept: MEDICAL GROUP | Facility: PHYSICIAN GROUP | Age: 76
End: 2021-09-07

## 2021-09-07 DIAGNOSIS — R19.5 POSITIVE COLORECTAL CANCER SCREENING USING COLOGUARD TEST: ICD-10-CM

## 2021-09-07 DIAGNOSIS — H91.10 PRESBYCUSIS, UNSPECIFIED LATERALITY: ICD-10-CM

## 2021-09-07 NOTE — TELEPHONE ENCOUNTER
1. Caller Name: Babar Willett                          Call Back Number: [phone] (home)         How would the patient prefer to be contacted with a response: not specified    2. SPECIFIC Action To Be Taken: Referral pending, please sign.    3. Diagnosis/Clinical Reason for Request: Positive FIT test    4. Specialty & Provider Name/Lab/Imaging Location: [facility] (pt preference)    5. Is appointment scheduled for requested order/referral: no    Patient was informed they will receive a return phone call from the office ONLY if there are any questions before processing their request. Advised to call back if they haven't received a call from the referral department in 5 days.

## 2021-09-07 NOTE — TELEPHONE ENCOUNTER
1. Caller Name: Babar Willett                          Call Back Number: [phone] (home)         How would the patient prefer to be contacted with a response: not specified    2. SPECIFIC Action To Be Taken: Referral pending, please sign.    3. Diagnosis/Clinical Reason for Request: presbycusis    4. Specialty & Provider Name/Lab/Imaging Location: Dr Esqueda    5. Is appointment scheduled for requested order/referral: no    Patient was informed they will receive a return phone call from the office ONLY if there are any questions before processing their request. Advised to call back if they haven't received a call from the referral department in 5 days.

## 2021-12-20 ENCOUNTER — APPOINTMENT (RX ONLY)
Dept: URBAN - METROPOLITAN AREA CLINIC 4 | Facility: CLINIC | Age: 76
Setting detail: DERMATOLOGY
End: 2021-12-20

## 2021-12-20 DIAGNOSIS — L81.4 OTHER MELANIN HYPERPIGMENTATION: ICD-10-CM

## 2021-12-20 DIAGNOSIS — L82.1 OTHER SEBORRHEIC KERATOSIS: ICD-10-CM

## 2021-12-20 PROCEDURE — ? LIQUID NITROGEN (COSMETIC)

## 2021-12-20 PROCEDURE — 99212 OFFICE O/P EST SF 10 MIN: CPT

## 2021-12-20 PROCEDURE — ? COUNSELING

## 2021-12-20 ASSESSMENT — LOCATION ZONE DERM: LOCATION ZONE: LEG

## 2021-12-20 ASSESSMENT — LOCATION SIMPLE DESCRIPTION DERM: LOCATION SIMPLE: LEFT POPLITEAL SKIN

## 2021-12-20 ASSESSMENT — LOCATION DETAILED DESCRIPTION DERM: LOCATION DETAILED: LEFT POPLITEAL SKIN

## 2021-12-20 NOTE — PROCEDURE: LIQUID NITROGEN (COSMETIC)
Post-Care Instructions: I reviewed with the patient in detail post-care instructions. Patient is to wear sunprotection, and avoid picking at any of the treated lesions. Pt may apply Vaseline to crusted or scabbing areas.
Price (Use Numbers Only, No Special Characters Or $): 0
Billing Information: Bill by Static Price
Detail Level: Detailed
Consent: The patient's consent was obtained including but not limited to risks of crusting, scabbing, blistering, scarring, darker or lighter pigmentary change, recurrence, incomplete removal and infection. The patient understands that the procedure is cosmetic in nature and is not covered by insurance.
Render Post-Care Instructions In Note?: no

## 2022-02-15 ENCOUNTER — OFFICE VISIT (OUTPATIENT)
Dept: MEDICAL GROUP | Facility: PHYSICIAN GROUP | Age: 77
End: 2022-02-15
Payer: MEDICARE

## 2022-02-15 VITALS
HEART RATE: 64 BPM | TEMPERATURE: 97.9 F | SYSTOLIC BLOOD PRESSURE: 106 MMHG | HEIGHT: 71 IN | RESPIRATION RATE: 16 BRPM | DIASTOLIC BLOOD PRESSURE: 66 MMHG | WEIGHT: 179 LBS | OXYGEN SATURATION: 97 % | BODY MASS INDEX: 25.06 KG/M2

## 2022-02-15 DIAGNOSIS — Z86.010 HISTORY OF ADENOMATOUS POLYP OF COLON: ICD-10-CM

## 2022-02-15 DIAGNOSIS — Z12.5 PROSTATE CANCER SCREENING: ICD-10-CM

## 2022-02-15 DIAGNOSIS — I10 ESSENTIAL HYPERTENSION, BENIGN: ICD-10-CM

## 2022-02-15 DIAGNOSIS — E78.5 HYPERLIPIDEMIA, UNSPECIFIED HYPERLIPIDEMIA TYPE: ICD-10-CM

## 2022-02-15 DIAGNOSIS — D68.69 SECONDARY HYPERCOAGULABILITY DISORDER (HCC): ICD-10-CM

## 2022-02-15 DIAGNOSIS — I48.0 PAF (PAROXYSMAL ATRIAL FIBRILLATION) (HCC): ICD-10-CM

## 2022-02-15 PROBLEM — Z76.89 ENCOUNTER TO ESTABLISH CARE WITH NEW DOCTOR: Status: RESOLVED | Noted: 2021-07-15 | Resolved: 2022-02-15

## 2022-02-15 PROBLEM — Z86.0101 HISTORY OF ADENOMATOUS POLYP OF COLON: Status: ACTIVE | Noted: 2022-02-15

## 2022-02-15 PROBLEM — Z12.11 SCREEN FOR COLON CANCER: Status: RESOLVED | Noted: 2017-08-30 | Resolved: 2022-02-15

## 2022-02-15 PROCEDURE — 99214 OFFICE O/P EST MOD 30 MIN: CPT | Performed by: FAMILY MEDICINE

## 2022-02-15 RX ORDER — PREDNISOLONE ACETATE 10 MG/ML
SUSPENSION/ DROPS OPHTHALMIC
COMMUNITY
Start: 2021-12-29 | End: 2022-02-15

## 2022-02-15 ASSESSMENT — PATIENT HEALTH QUESTIONNAIRE - PHQ9: CLINICAL INTERPRETATION OF PHQ2 SCORE: 0

## 2022-02-15 NOTE — PROGRESS NOTES
Subjective:     CC: Here for follow-up.  Feeling very well on today's visit.    HPI:   Babar presents today with the following medical concerns:    Essential hypertension, benign  This is a chronic condition.  Patient is here for follow-up.  Blood pressures well controlled on current medications.    History of adenomatous polyp of colon  This is a new problem.  Patient had a positive Cologuard last year and underwent a colonoscopy.  He was found had adenomas and serrated polyps.  He has a follow-up colonoscopy scheduled for 1 year.    Hyperlipidemia  This is a chronic problem.  Patient is on a statin and tolerating it well.    PAF (paroxysmal atrial fibrillation)  This is a chronic problem.  Patient is on Xarelto.    Secondary hypercoagulability disorder (HCC)  This is a chronic problem.  Patient is on Xarelto due to PAF.      Past Medical History:   Diagnosis Date   • Allergy    • History of CVA (cerebrovascular accident) 2015   • Hyperglycemia 9/3/2019   • Medicare annual wellness visit, subsequent 2015   • PAF (paroxysmal atrial fibrillation) (HCC) 2015   • Stroke (HCC)    • TIA (transient ischemic attack)        Social History     Tobacco Use   • Smoking status: Former Smoker     Packs/day: 0.50     Years: 2.00     Pack years: 1.00     Types: Cigarettes     Quit date: 1969     Years since quittin.1   • Smokeless tobacco: Never Used   • Tobacco comment: Started smoking at age 22   Vaping Use   • Vaping Use: Never used   Substance Use Topics   • Alcohol use: Yes     Alcohol/week: 1.2 oz     Types: 2 Glasses of wine per week     Comment: 2 glass of wine/night   • Drug use: No       Current Outpatient Medications Ordered in Epic   Medication Sig Dispense Refill   • Glucos-Chondroit-Hyaluron-MSM (GLUCOSAMINE CHONDROITIN JOINT PO) Take  by mouth.     • metoprolol SR (TOPROL XL) 50 MG TABLET SR 24 HR Take 50 mg by mouth every day.     • ramipril (ALTACE) 2.5 MG Cap Take 2.5 mg by mouth every  "day.     • atorvastatin (LIPITOR) 10 MG TABS Take 10 mg by mouth every evening.     • rivaroxaban (XARELTO) 20 MG TABS tablet Take 20 mg by mouth with dinner.       No current Epic-ordered facility-administered medications on file.       Allergies:  Patient has no known allergies.    Health Maintenance: Completed    ROS:  Gen: no fevers/chills, no changes in weight  Eyes: no changes in vision  ENT: no sore throat, no hearing loss, no bloody nose  Pulm: no sob, no cough  CV: no chest pain, no palpitations  GI: no nausea/vomiting, no diarrhea  : no dysuria  MSk: no myalgias  Skin: no rash  Neuro: no headaches, no numbness/tingling  Heme/Lymph: no easy bruising      Objective:       Exam:  /66 (BP Location: Right arm, Patient Position: Sitting, BP Cuff Size: Adult)   Pulse 64   Temp 36.6 °C (97.9 °F) (Temporal)   Resp 16   Ht 1.803 m (5' 11\")   Wt 81.2 kg (179 lb)   SpO2 97%   BMI 24.97 kg/m²  Body mass index is 24.97 kg/m².    Gen: Alert and oriented, No apparent distress.  Eyes:   Extraocular motion is intact.  Disks are sharp.  Ears:    Ear canals and TMs are normal.  Neck: Neck is supple without lymphadenopathy.  Thyroid exam is normal.  Lungs: Normal effort, CTA bilaterally, no wheezes, rhonchi, or rales  CV: Regular rate and rhythm. No murmurs, rubs, or gallops.  Abdomen:, Nontender, no organomegaly or masses normal bowel sounds.  Ext: No clubbing, cyanosis, edema.  Neuro: Cranial nerves II through VIII are grossly intact.  No lateralized signs are seen.    Labs: Ordered    Assessment & Plan:     76 y.o. male with the following -     1. History of adenomatous polyp of colon  This is a new problem.  Continue to follow.  Follow-up colonoscopy later this year.    2. Essential hypertension, benign  This is a chronic problem.  Blood pressures good control on current medications.  Continue current care.  Continue follow-up with cardiology.  - Comp Metabolic Panel; Future  - Lipid Profile; Future  - " URINALYSIS,CULTURE IF INDICATED; Future  - CBC WITH DIFFERENTIAL; Future  - ESTIMATED GFR; Future    3. Hyperlipidemia, unspecified hyperlipidemia type  This is a chronic problem.  Continue a statin.  - Lipid Profile; Future    4. PAF (paroxysmal atrial fibrillation) (HCC)  This is a chronic problem.  Continue follow-up with cardiology.    5. Secondary hypercoagulability disorder (HCC)  This is a chronic problem.  Continue Xarelto.    6. Prostate cancer screening  This is a screening issue.  Lab ordered.  - PROSTATE SPECIFIC AG SCREENING; Future      Return in about 6 months (around 8/15/2022) for Long.  37 minutes spent with patient.  Please note that this dictation was created using voice recognition software. I have made every reasonable attempt to correct obvious errors, but I expect that there are errors of grammar and possibly content that I did not discover before finalizing the note.

## 2022-02-15 NOTE — ASSESSMENT & PLAN NOTE
This is a chronic condition.  Patient is here for follow-up.  Blood pressures well controlled on current medications.

## 2022-02-15 NOTE — ASSESSMENT & PLAN NOTE
This is a new problem.  Patient had a positive Cologuard last year and underwent a colonoscopy.  He was found had adenomas and serrated polyps.  He has a follow-up colonoscopy scheduled for 1 year.

## 2022-03-03 ENCOUNTER — HOSPITAL ENCOUNTER (OUTPATIENT)
Dept: LAB | Facility: MEDICAL CENTER | Age: 77
End: 2022-03-03
Attending: FAMILY MEDICINE
Payer: MEDICARE

## 2022-03-03 DIAGNOSIS — E78.5 HYPERLIPIDEMIA, UNSPECIFIED HYPERLIPIDEMIA TYPE: ICD-10-CM

## 2022-03-03 DIAGNOSIS — Z12.5 PROSTATE CANCER SCREENING: ICD-10-CM

## 2022-03-03 DIAGNOSIS — I10 ESSENTIAL HYPERTENSION, BENIGN: ICD-10-CM

## 2022-03-03 LAB
ALBUMIN SERPL BCP-MCNC: 4.2 G/DL (ref 3.2–4.9)
ALBUMIN/GLOB SERPL: 1.8 G/DL
ALP SERPL-CCNC: 45 U/L (ref 30–99)
ALT SERPL-CCNC: 23 U/L (ref 2–50)
ANION GAP SERPL CALC-SCNC: 8 MMOL/L (ref 7–16)
APPEARANCE UR: CLEAR
AST SERPL-CCNC: 28 U/L (ref 12–45)
BACTERIA #/AREA URNS HPF: NEGATIVE /HPF
BASOPHILS # BLD AUTO: 0.8 % (ref 0–1.8)
BASOPHILS # BLD: 0.03 K/UL (ref 0–0.12)
BILIRUB SERPL-MCNC: 0.8 MG/DL (ref 0.1–1.5)
BILIRUB UR QL STRIP.AUTO: NEGATIVE
BUN SERPL-MCNC: 13 MG/DL (ref 8–22)
CALCIUM SERPL-MCNC: 9.1 MG/DL (ref 8.5–10.5)
CHLORIDE SERPL-SCNC: 107 MMOL/L (ref 96–112)
CHOLEST SERPL-MCNC: 174 MG/DL (ref 100–199)
CO2 SERPL-SCNC: 26 MMOL/L (ref 20–33)
COLOR UR: YELLOW
CREAT SERPL-MCNC: 0.92 MG/DL (ref 0.5–1.4)
EOSINOPHIL # BLD AUTO: 0.08 K/UL (ref 0–0.51)
EOSINOPHIL NFR BLD: 2 % (ref 0–6.9)
EPI CELLS #/AREA URNS HPF: NEGATIVE /HPF
ERYTHROCYTE [DISTWIDTH] IN BLOOD BY AUTOMATED COUNT: 44.9 FL (ref 35.9–50)
FASTING STATUS PATIENT QL REPORTED: NORMAL
GLOBULIN SER CALC-MCNC: 2.4 G/DL (ref 1.9–3.5)
GLUCOSE SERPL-MCNC: 106 MG/DL (ref 65–99)
GLUCOSE UR STRIP.AUTO-MCNC: NEGATIVE MG/DL
HCT VFR BLD AUTO: 43.1 % (ref 42–52)
HDLC SERPL-MCNC: 105 MG/DL
HGB BLD-MCNC: 14.5 G/DL (ref 14–18)
HYALINE CASTS #/AREA URNS LPF: ABNORMAL /LPF
IMM GRANULOCYTES # BLD AUTO: 0.01 K/UL (ref 0–0.11)
IMM GRANULOCYTES NFR BLD AUTO: 0.3 % (ref 0–0.9)
KETONES UR STRIP.AUTO-MCNC: NEGATIVE MG/DL
LDLC SERPL CALC-MCNC: 61 MG/DL
LEUKOCYTE ESTERASE UR QL STRIP.AUTO: ABNORMAL
LYMPHOCYTES # BLD AUTO: 1.07 K/UL (ref 1–4.8)
LYMPHOCYTES NFR BLD: 26.8 % (ref 22–41)
MCH RBC QN AUTO: 31.9 PG (ref 27–33)
MCHC RBC AUTO-ENTMCNC: 33.6 G/DL (ref 33.7–35.3)
MCV RBC AUTO: 94.9 FL (ref 81.4–97.8)
MICRO URNS: ABNORMAL
MONOCYTES # BLD AUTO: 0.45 K/UL (ref 0–0.85)
MONOCYTES NFR BLD AUTO: 11.3 % (ref 0–13.4)
NEUTROPHILS # BLD AUTO: 2.35 K/UL (ref 1.82–7.42)
NEUTROPHILS NFR BLD: 58.8 % (ref 44–72)
NITRITE UR QL STRIP.AUTO: NEGATIVE
NRBC # BLD AUTO: 0 K/UL
NRBC BLD-RTO: 0 /100 WBC
PH UR STRIP.AUTO: 6 [PH] (ref 5–8)
PLATELET # BLD AUTO: 199 K/UL (ref 164–446)
PMV BLD AUTO: 9.8 FL (ref 9–12.9)
POTASSIUM SERPL-SCNC: 4.8 MMOL/L (ref 3.6–5.5)
PROT SERPL-MCNC: 6.6 G/DL (ref 6–8.2)
PROT UR QL STRIP: NEGATIVE MG/DL
PSA SERPL-MCNC: 3.58 NG/ML (ref 0–4)
RBC # BLD AUTO: 4.54 M/UL (ref 4.7–6.1)
RBC # URNS HPF: ABNORMAL /HPF
RBC UR QL AUTO: NEGATIVE
SODIUM SERPL-SCNC: 141 MMOL/L (ref 135–145)
SP GR UR STRIP.AUTO: 1.02
TRIGL SERPL-MCNC: 38 MG/DL (ref 0–149)
UROBILINOGEN UR STRIP.AUTO-MCNC: 0.2 MG/DL
WBC # BLD AUTO: 4 K/UL (ref 4.8–10.8)
WBC #/AREA URNS HPF: ABNORMAL /HPF

## 2022-03-03 PROCEDURE — 80053 COMPREHEN METABOLIC PANEL: CPT

## 2022-03-03 PROCEDURE — 84153 ASSAY OF PSA TOTAL: CPT | Mod: GA

## 2022-03-03 PROCEDURE — 85025 COMPLETE CBC W/AUTO DIFF WBC: CPT

## 2022-03-03 PROCEDURE — 36415 COLL VENOUS BLD VENIPUNCTURE: CPT

## 2022-03-03 PROCEDURE — 81001 URINALYSIS AUTO W/SCOPE: CPT

## 2022-03-03 PROCEDURE — 80061 LIPID PANEL: CPT

## 2022-08-15 ENCOUNTER — OFFICE VISIT (OUTPATIENT)
Dept: MEDICAL GROUP | Facility: PHYSICIAN GROUP | Age: 77
End: 2022-08-15
Payer: MEDICARE

## 2022-08-15 VITALS
RESPIRATION RATE: 16 BRPM | SYSTOLIC BLOOD PRESSURE: 108 MMHG | OXYGEN SATURATION: 97 % | HEIGHT: 71 IN | WEIGHT: 174 LBS | BODY MASS INDEX: 24.36 KG/M2 | HEART RATE: 54 BPM | TEMPERATURE: 97.7 F | DIASTOLIC BLOOD PRESSURE: 68 MMHG

## 2022-08-15 DIAGNOSIS — I10 ESSENTIAL HYPERTENSION, BENIGN: ICD-10-CM

## 2022-08-15 DIAGNOSIS — Z86.010 HISTORY OF ADENOMATOUS POLYP OF COLON: ICD-10-CM

## 2022-08-15 DIAGNOSIS — I48.0 PAF (PAROXYSMAL ATRIAL FIBRILLATION) (HCC): ICD-10-CM

## 2022-08-15 DIAGNOSIS — E78.5 HYPERLIPIDEMIA, UNSPECIFIED HYPERLIPIDEMIA TYPE: ICD-10-CM

## 2022-08-15 PROCEDURE — 99214 OFFICE O/P EST MOD 30 MIN: CPT | Performed by: FAMILY MEDICINE

## 2022-08-15 ASSESSMENT — FIBROSIS 4 INDEX: FIB4 SCORE: 2.2

## 2022-08-15 NOTE — ASSESSMENT & PLAN NOTE
Patient is here for follow-up.  This is a chronic condition.  He is doing very well.  States he has an upcoming appointment with his cardiologist as well.

## 2022-08-15 NOTE — PROGRESS NOTES
Subjective:     CC: Here for follow-up.    HPI:   Babar presents today with the following medical concerns:    Essential hypertension, benign  Patient is here for follow-up.  This is a chronic condition.  He is doing very well.  States he has an upcoming appointment with his cardiologist as well.    History of adenomatous polyp of colon  This is a chronic problem.  Patient was found to have a serrated polyp last year with dysplasia.  He is due to have a follow-up evaluation around November of this year.    Hyperlipidemia  This is a chronic problem.  Patient is on a statin and tolerating it well.    PAF (paroxysmal atrial fibrillation)  This is a chronic problem.  He does follow-up with a cardiologist on a regular basis.    Past Medical History:   Diagnosis Date    Allergy     History of CVA (cerebrovascular accident) 2015    Hyperglycemia 9/3/2019    Medicare annual wellness visit, subsequent 2015    PAF (paroxysmal atrial fibrillation) (HCC) 2015    Stroke (HCC)     TIA (transient ischemic attack)        Social History     Tobacco Use    Smoking status: Former     Packs/day: 0.50     Years: 2.00     Pack years: 1.00     Types: Cigarettes     Quit date: 1969     Years since quittin.6    Smokeless tobacco: Never    Tobacco comments:     Started smoking at age 22   Vaping Use    Vaping Use: Never used   Substance Use Topics    Alcohol use: Yes     Alcohol/week: 1.2 oz     Types: 2 Glasses of wine per week     Comment: 2 glass of wine/night    Drug use: No       Current Outpatient Medications Ordered in Epic   Medication Sig Dispense Refill    metoprolol SR (TOPROL XL) 50 MG TABLET SR 24 HR Take 50 mg by mouth every day.      ramipril (ALTACE) 2.5 MG Cap Take 2.5 mg by mouth every day.      atorvastatin (LIPITOR) 10 MG TABS Take 10 mg by mouth every evening.      rivaroxaban (XARELTO) 20 MG Tab tablet Take 20 mg by mouth with dinner.      Glucos-Chondroit-Hyaluron-MSM (GLUCOSAMINE CHONDROITIN  "JOINT PO) Take  by mouth.       No current Epic-ordered facility-administered medications on file.       Allergies:  Patient has no known allergies.    Health Maintenance: Completed    ROS:  Gen: no fevers/chills, no changes in weight  Eyes: no changes in vision  ENT: no sore throat, no hearing loss, no bloody nose  Pulm: no sob, no cough  CV: no chest pain, no palpitations  GI: no nausea/vomiting, no diarrhea  : no dysuria  MSk: no myalgias  Skin: no rash  Neuro: no headaches, no numbness/tingling  Heme/Lymph: no easy bruising      Objective:       Exam:  /68 (BP Location: Right arm, Patient Position: Sitting, BP Cuff Size: Adult)   Pulse (!) 54   Temp 36.5 °C (97.7 °F) (Temporal)   Resp 16   Ht 1.803 m (5' 11\")   Wt 78.9 kg (174 lb)   SpO2 97%   BMI 24.27 kg/m²  Body mass index is 24.27 kg/m².    Gen: Alert and oriented, No apparent distress.  Eyes:    Extraocular motions intact.  No scleral icterus seen.  Neck: Neck is supple without lymphadenopathy.  Thyroid exam is normal.  Lungs: Normal effort, CTA bilaterally, no wheezes, rhonchi, or rales  CV: Regular rate and rhythm. No murmurs, rubs, or gallops.  Abdomen: Soft, nontender, no organomegaly or masses.  No palpable aneurysm.  Ext: No clubbing, cyanosis, edema.  Muscle atrophy to the left hand seen from previous injury  Neuro: Cranial nerves II through VIII are grossly intact.  No lateralized signs are seen.    Labs: Last results reviewed with patient.    Assessment & Plan:     77 y.o. male with the following -     1. Essential hypertension, benign  This is a chronic problem under good control.  His blood pressure is a little on the lower side today.  He will see what his cardiologist has to say.  No dizziness.    2. History of adenomatous polyp of colon  This is a chronic problem.  He is to follow-up with his gastroenterologist in November of this year.  Patient was reminded.    3. Hyperlipidemia, unspecified hyperlipidemia type  This is a " chronic problem.  Under good control.  Continue current medications.    4. PAF (paroxysmal atrial fibrillation) (HCC)  This is a chronic problem.  Continue to follow.      Return in about 6 months (around 2/15/2023) for Long.    Please note that this dictation was created using voice recognition software. I have made every reasonable attempt to correct obvious errors, but I expect that there are errors of grammar and possibly content that I did not discover before finalizing the note.

## 2022-08-15 NOTE — ASSESSMENT & PLAN NOTE
This is a chronic problem.  Patient was found to have a serrated polyp last year with dysplasia.  He is due to have a follow-up evaluation around November of this year.

## 2022-08-31 ENCOUNTER — OFFICE VISIT (OUTPATIENT)
Dept: URGENT CARE | Facility: CLINIC | Age: 77
End: 2022-08-31
Payer: MEDICARE

## 2022-08-31 VITALS
RESPIRATION RATE: 16 BRPM | TEMPERATURE: 98.2 F | WEIGHT: 172.2 LBS | HEART RATE: 69 BPM | HEIGHT: 71 IN | BODY MASS INDEX: 24.11 KG/M2 | DIASTOLIC BLOOD PRESSURE: 92 MMHG | OXYGEN SATURATION: 98 % | SYSTOLIC BLOOD PRESSURE: 120 MMHG

## 2022-08-31 DIAGNOSIS — L02.02 BOIL, FACE: ICD-10-CM

## 2022-08-31 PROCEDURE — 99213 OFFICE O/P EST LOW 20 MIN: CPT | Performed by: NURSE PRACTITIONER

## 2022-08-31 ASSESSMENT — FIBROSIS 4 INDEX: FIB4 SCORE: 2.2

## 2022-08-31 ASSESSMENT — ENCOUNTER SYMPTOMS
CHILLS: 0
FEVER: 0
VOMITING: 0
NAUSEA: 0

## 2022-08-31 NOTE — PROGRESS NOTES
Subjective:     Babar Willett is a 77 y.o. male who presents for Bug Bite (X 3-4 days, possible bug bite on Lt. Jaw, little sore)      HPI  Pt presents for evaluation of a new problem.  Babar is a pleasant 77-year-old gentleman who presents to urgent care today with complaints of a painful mass to his left exterior jaw that has been ongoing for the past 3 to 4 days.  He notes that this has progressively gotten bigger and does note a black center.  He is unsure if he received a bug bite or if he scraped his jaw.  He denies fever, chills, nausea or vomiting.  He states that he would normally try to pop this however, he is unable to get himself into a position where he can see what he is doing.    Review of Systems   Constitutional:  Negative for chills and fever.   Gastrointestinal:  Negative for nausea and vomiting.     PMH:   Past Medical History:   Diagnosis Date    Allergy     History of CVA (cerebrovascular accident) 2015    Hyperglycemia 9/3/2019    Medicare annual wellness visit, subsequent 2015    PAF (paroxysmal atrial fibrillation) (HCC) 2015    Stroke (HCC)     TIA (transient ischemic attack)      ALLERGIES: No Known Allergies  SURGHX:   Past Surgical History:   Procedure Laterality Date    OTHER CARDIAC SURGERY  2015    amplatzer septal occluder    ARTHROPLASTY Left     left knee replacement    INGUINAL HERNIA REPAIR      Hernia Repair, Inguinal    KNEE ARTHROSCOPY       SOCHX:   Social History     Socioeconomic History    Marital status:    Tobacco Use    Smoking status: Former     Packs/day: 0.50     Years: 2.00     Pack years: 1.00     Types: Cigarettes     Quit date: 1969     Years since quittin.6    Smokeless tobacco: Never    Tobacco comments:     Started smoking at age 22   Vaping Use    Vaping Use: Never used   Substance and Sexual Activity    Alcohol use: Yes     Alcohol/week: 1.2 oz     Types: 2 Glasses of wine per week     Comment: 2 glass of wine/night  "   Drug use: No    Sexual activity: Not Currently     Partners: Female     FH:   Family History   Problem Relation Age of Onset    Heart Disease Father         Arteriosclerosis     Heart Attack Father     Arthritis Sister     No Known Problems Brother     Other Mother         Gluacoma    Stroke Brother         TIA    Prostate cancer Brother     Heart Disease Sister         Pacemaker    Other Sister         Cataract    Other Brother         Down syndrome    Suicide Attempts Brother     Depression Brother     Cancer Maternal Aunt         intestinal     No Known Problems Daughter          Objective:   BP (!) 120/92 (BP Location: Left arm, Patient Position: Sitting, BP Cuff Size: Adult)   Pulse 69   Temp 36.8 °C (98.2 °F) (Temporal)   Resp 16   Ht 1.803 m (5' 11\")   Wt 78.1 kg (172 lb 3.2 oz)   SpO2 98%   BMI 24.02 kg/m²     Physical Exam  Vitals and nursing note reviewed.   Constitutional:       General: He is not in acute distress.     Appearance: Normal appearance. He is normal weight. He is not ill-appearing or toxic-appearing.   HENT:      Head: Normocephalic.      Right Ear: External ear normal.      Left Ear: External ear normal.      Nose: Nose normal.      Mouth/Throat:      Mouth: Mucous membranes are moist.   Eyes:      General:         Right eye: No discharge.         Left eye: No discharge.      Extraocular Movements: Extraocular movements intact.      Conjunctiva/sclera: Conjunctivae normal.      Pupils: Pupils are equal, round, and reactive to light.   Pulmonary:      Effort: Pulmonary effort is normal.      Breath sounds: Normal breath sounds.   Abdominal:      General: Abdomen is flat.   Musculoskeletal:         General: Normal range of motion.      Cervical back: Normal range of motion and neck supple. No rigidity.   Lymphadenopathy:      Cervical: No cervical adenopathy.   Skin:     General: Skin is warm and dry.      Comments: Positive for open comedone to left jawline 0.5 cm.  Positive for " fluctuance.  Area was cleansed with normal saline Hibiclens.  23-gauge needle inserted into area of concern where purulent material was returned with light pressure.  Wound was then irrigated with normal saline.  Wound was dressed with Polysporin and Band-Aid.   Neurological:      General: No focal deficit present.      Mental Status: He is alert and oriented to person, place, and time. Mental status is at baseline.   Psychiatric:         Mood and Affect: Mood normal.         Behavior: Behavior normal.         Judgment: Judgment normal.       Assessment/Plan:   Assessment    1. Boil, face        Patient encouraged to apply frequent warm compresses followed with Polysporin.  Return for worsening or persistent symptoms.    AVS handout given and reviewed with patient. Pt educated on red flags and when to seek treatment back in ER or UC.

## 2022-11-08 ENCOUNTER — PATIENT MESSAGE (OUTPATIENT)
Dept: HEALTH INFORMATION MANAGEMENT | Facility: OTHER | Age: 77
End: 2022-11-08

## 2023-01-07 SDOH — ECONOMIC STABILITY: HOUSING INSECURITY
IN THE LAST 12 MONTHS, WAS THERE A TIME WHEN YOU DID NOT HAVE A STEADY PLACE TO SLEEP OR SLEPT IN A SHELTER (INCLUDING NOW)?: PATIENT REFUSED

## 2023-01-07 SDOH — ECONOMIC STABILITY: INCOME INSECURITY: HOW HARD IS IT FOR YOU TO PAY FOR THE VERY BASICS LIKE FOOD, HOUSING, MEDICAL CARE, AND HEATING?: PATIENT DECLINED

## 2023-01-07 SDOH — HEALTH STABILITY: PHYSICAL HEALTH
ON AVERAGE, HOW MANY DAYS PER WEEK DO YOU ENGAGE IN MODERATE TO STRENUOUS EXERCISE (LIKE A BRISK WALK)?: PATIENT DECLINED

## 2023-01-07 SDOH — ECONOMIC STABILITY: FOOD INSECURITY: WITHIN THE PAST 12 MONTHS, THE FOOD YOU BOUGHT JUST DIDN'T LAST AND YOU DIDN'T HAVE MONEY TO GET MORE.: PATIENT DECLINED

## 2023-01-07 SDOH — ECONOMIC STABILITY: TRANSPORTATION INSECURITY
IN THE PAST 12 MONTHS, HAS THE LACK OF TRANSPORTATION KEPT YOU FROM MEDICAL APPOINTMENTS OR FROM GETTING MEDICATIONS?: PATIENT DECLINED

## 2023-01-07 SDOH — HEALTH STABILITY: MENTAL HEALTH
STRESS IS WHEN SOMEONE FEELS TENSE, NERVOUS, ANXIOUS, OR CAN'T SLEEP AT NIGHT BECAUSE THEIR MIND IS TROUBLED. HOW STRESSED ARE YOU?: NOT AT ALL

## 2023-01-07 SDOH — HEALTH STABILITY: PHYSICAL HEALTH: ON AVERAGE, HOW MANY MINUTES DO YOU ENGAGE IN EXERCISE AT THIS LEVEL?: PATIENT DECLINED

## 2023-01-07 SDOH — ECONOMIC STABILITY: FOOD INSECURITY: WITHIN THE PAST 12 MONTHS, YOU WORRIED THAT YOUR FOOD WOULD RUN OUT BEFORE YOU GOT MONEY TO BUY MORE.: PATIENT DECLINED

## 2023-01-07 SDOH — ECONOMIC STABILITY: TRANSPORTATION INSECURITY
IN THE PAST 12 MONTHS, HAS LACK OF TRANSPORTATION KEPT YOU FROM MEETINGS, WORK, OR FROM GETTING THINGS NEEDED FOR DAILY LIVING?: PATIENT DECLINED

## 2023-01-07 SDOH — ECONOMIC STABILITY: INCOME INSECURITY: IN THE LAST 12 MONTHS, WAS THERE A TIME WHEN YOU WERE NOT ABLE TO PAY THE MORTGAGE OR RENT ON TIME?: PATIENT REFUSED

## 2023-01-07 SDOH — ECONOMIC STABILITY: TRANSPORTATION INSECURITY
IN THE PAST 12 MONTHS, HAS LACK OF RELIABLE TRANSPORTATION KEPT YOU FROM MEDICAL APPOINTMENTS, MEETINGS, WORK OR FROM GETTING THINGS NEEDED FOR DAILY LIVING?: PATIENT DECLINED

## 2023-01-07 SDOH — ECONOMIC STABILITY: HOUSING INSECURITY

## 2023-01-07 ASSESSMENT — LIFESTYLE VARIABLES
SKIP TO QUESTIONS 9-10: 0
HOW OFTEN DO YOU HAVE SIX OR MORE DRINKS ON ONE OCCASION: PATIENT DECLINED
AUDIT-C TOTAL SCORE: -1
HOW OFTEN DO YOU HAVE A DRINK CONTAINING ALCOHOL: PATIENT DECLINED
HOW MANY STANDARD DRINKS CONTAINING ALCOHOL DO YOU HAVE ON A TYPICAL DAY: PATIENT DECLINED

## 2023-01-07 ASSESSMENT — SOCIAL DETERMINANTS OF HEALTH (SDOH)
HOW OFTEN DO YOU HAVE SIX OR MORE DRINKS ON ONE OCCASION: PATIENT DECLINED
WITHIN THE PAST 12 MONTHS, YOU WORRIED THAT YOUR FOOD WOULD RUN OUT BEFORE YOU GOT THE MONEY TO BUY MORE: PATIENT DECLINED
HOW OFTEN DO YOU GET TOGETHER WITH FRIENDS OR RELATIVES?: PATIENT DECLINED
HOW OFTEN DO YOU GET TOGETHER WITH FRIENDS OR RELATIVES?: PATIENT DECLINED
HOW MANY DRINKS CONTAINING ALCOHOL DO YOU HAVE ON A TYPICAL DAY WHEN YOU ARE DRINKING: PATIENT DECLINED
HOW OFTEN DO YOU HAVE A DRINK CONTAINING ALCOHOL: PATIENT DECLINED
DO YOU BELONG TO ANY CLUBS OR ORGANIZATIONS SUCH AS CHURCH GROUPS UNIONS, FRATERNAL OR ATHLETIC GROUPS, OR SCHOOL GROUPS?: NO
HOW OFTEN DO YOU ATTEND CHURCH OR RELIGIOUS SERVICES?: PATIENT DECLINED
IN A TYPICAL WEEK, HOW MANY TIMES DO YOU TALK ON THE PHONE WITH FAMILY, FRIENDS, OR NEIGHBORS?: PATIENT DECLINED
HOW OFTEN DO YOU ATTENT MEETINGS OF THE CLUB OR ORGANIZATION YOU BELONG TO?: PATIENT DECLINED
DO YOU BELONG TO ANY CLUBS OR ORGANIZATIONS SUCH AS CHURCH GROUPS UNIONS, FRATERNAL OR ATHLETIC GROUPS, OR SCHOOL GROUPS?: NO
IN A TYPICAL WEEK, HOW MANY TIMES DO YOU TALK ON THE PHONE WITH FAMILY, FRIENDS, OR NEIGHBORS?: PATIENT DECLINED
HOW OFTEN DO YOU ATTEND CHURCH OR RELIGIOUS SERVICES?: PATIENT DECLINED
HOW HARD IS IT FOR YOU TO PAY FOR THE VERY BASICS LIKE FOOD, HOUSING, MEDICAL CARE, AND HEATING?: PATIENT DECLINED
HOW OFTEN DO YOU ATTENT MEETINGS OF THE CLUB OR ORGANIZATION YOU BELONG TO?: PATIENT DECLINED

## 2023-01-09 ENCOUNTER — OFFICE VISIT (OUTPATIENT)
Dept: MEDICAL GROUP | Facility: PHYSICIAN GROUP | Age: 78
End: 2023-01-09
Payer: MEDICARE

## 2023-01-09 VITALS
HEART RATE: 80 BPM | DIASTOLIC BLOOD PRESSURE: 80 MMHG | WEIGHT: 178.8 LBS | HEIGHT: 71 IN | RESPIRATION RATE: 16 BRPM | SYSTOLIC BLOOD PRESSURE: 124 MMHG | OXYGEN SATURATION: 97 % | TEMPERATURE: 97.8 F | BODY MASS INDEX: 25.03 KG/M2

## 2023-01-09 DIAGNOSIS — Z02.4 DRIVER'S PERMIT PHYSICAL EXAMINATION: ICD-10-CM

## 2023-01-09 DIAGNOSIS — D68.69 SECONDARY HYPERCOAGULABILITY DISORDER (HCC): ICD-10-CM

## 2023-01-09 DIAGNOSIS — I48.0 PAF (PAROXYSMAL ATRIAL FIBRILLATION) (HCC): ICD-10-CM

## 2023-01-09 PROCEDURE — 99213 OFFICE O/P EST LOW 20 MIN: CPT | Performed by: FAMILY MEDICINE

## 2023-01-09 ASSESSMENT — PATIENT HEALTH QUESTIONNAIRE - PHQ9: CLINICAL INTERPRETATION OF PHQ2 SCORE: 0

## 2023-01-09 ASSESSMENT — FIBROSIS 4 INDEX: FIB4 SCORE: 2.2

## 2023-01-09 NOTE — ASSESSMENT & PLAN NOTE
This is a chronic problem.  Patient is on Xarelto for his paroxysmal atrial fibrillation.  Managed by his cardiologist.

## 2023-01-09 NOTE — ASSESSMENT & PLAN NOTE
Patient is here today for his DMV form to be completed.  States he is feeling well very well and having no particular concerns.

## 2023-01-09 NOTE — PROGRESS NOTES
Subjective:     CC: Here today to have a DMV form completed.    HPI:   Babar presents today with the following medical concerns:    's permit physical examination  Patient is here today for his DMV form to be completed.  States he is feeling well very well and having no particular concerns.    PAF (paroxysmal atrial fibrillation)  This is a chronic problem.  It is stable.  Is managed by the cardiologist.    Secondary hypercoagulability disorder (HCC)  This is a chronic problem.  Patient is on Xarelto for his paroxysmal atrial fibrillation.  Managed by his cardiologist.    Past Medical History:   Diagnosis Date    Allergy     History of CVA (cerebrovascular accident) 2015    Hyperglycemia 9/3/2019    Medicare annual wellness visit, subsequent 2015    PAF (paroxysmal atrial fibrillation) (HCC) 2015    Stroke (HCC)     TIA (transient ischemic attack)        Social History     Tobacco Use    Smoking status: Former     Packs/day: 0.50     Years: 2.00     Pack years: 1.00     Types: Cigarettes     Quit date: 1969     Years since quittin.0    Smokeless tobacco: Never    Tobacco comments:     Started smoking at age 22   Vaping Use    Vaping Use: Never used   Substance Use Topics    Alcohol use: Yes     Alcohol/week: 4.2 oz     Types: 7 Glasses of wine per week     Comment: 2 glass of wine/night    Drug use: Never       Current Outpatient Medications Ordered in Epic   Medication Sig Dispense Refill    Glucos-Chondroit-Hyaluron-MSM (GLUCOSAMINE CHONDROITIN JOINT PO) Take  by mouth.      metoprolol SR (TOPROL XL) 50 MG TABLET SR 24 HR Take 50 mg by mouth every day.      ramipril (ALTACE) 2.5 MG Cap Take 2.5 mg by mouth every day.      atorvastatin (LIPITOR) 10 MG TABS Take 10 mg by mouth every evening.      rivaroxaban (XARELTO) 20 MG Tab tablet Take 20 mg by mouth with dinner.       No current Epic-ordered facility-administered medications on file.       Allergies:  Patient has no known  "allergies.    Health Maintenance: Completed    ROS:  Gen: no fevers/chills, no changes in weight  Eyes: no changes in vision  ENT: no sore throat, no hearing loss, no bloody nose  Pulm: no sob, no cough  CV: no chest pain, no palpitations  GI: no nausea/vomiting, no diarrhea  : no dysuria  MSk: no myalgias  Skin: no rash  Neuro: no headaches, no numbness/tingling  Heme/Lymph: no easy bruising      Objective:       Exam:  /80 (BP Location: Left arm, Patient Position: Sitting, BP Cuff Size: Adult)   Pulse 80   Temp 36.6 °C (97.8 °F) (Temporal)   Resp 16   Ht 1.803 m (5' 11\")   Wt 81.1 kg (178 lb 12.8 oz)   SpO2 97%   BMI 24.94 kg/m²  Body mass index is 24.94 kg/m².    Gen: Alert and oriented, No apparent distress.  Neck: Neck is supple without lymphadenopathy.  Lungs: Normal effort, CTA bilaterally, no wheezes, rhonchi, or rales  CV: Regular rate and rhythm. No murmurs, rubs, or gallops.  Ext: No clubbing, cyanosis, edema.        Labs: Labs will be due after March 2.    Assessment & Plan:     77 y.o. male with the following -     1. 's permit physical examination  Form completed.    2. Secondary hypercoagulability disorder (HCC)  This is a chronic problem.  Continue current medications.    3. PAF (paroxysmal atrial fibrillation) (HCC)  This is a chronic stable condition.  Continue follow-up with cardiology.      Return in about 3 months (around 4/9/2023) for Long.    Please note that this dictation was created using voice recognition software. I have made every reasonable attempt to correct obvious errors, but I expect that there are errors of grammar and possibly content that I did not discover before finalizing the note.        "

## 2023-02-15 ENCOUNTER — APPOINTMENT (OUTPATIENT)
Dept: MEDICAL GROUP | Facility: PHYSICIAN GROUP | Age: 78
End: 2023-02-15
Payer: MEDICARE

## 2023-03-02 ENCOUNTER — PATIENT MESSAGE (OUTPATIENT)
Dept: MEDICAL GROUP | Facility: PHYSICIAN GROUP | Age: 78
End: 2023-03-02
Payer: MEDICARE

## 2023-03-02 DIAGNOSIS — I10 ESSENTIAL HYPERTENSION, BENIGN: ICD-10-CM

## 2023-03-02 NOTE — PATIENT COMMUNICATION
Patient was called and informed that lab orders were placed earlier today. Patient was very understanding and stated he will go to the lab tomorrow and have it completed so it can be ready for appt scheduled 03/09/2023

## 2023-03-03 ENCOUNTER — HOSPITAL ENCOUNTER (OUTPATIENT)
Dept: LAB | Facility: MEDICAL CENTER | Age: 78
End: 2023-03-03
Attending: FAMILY MEDICINE
Payer: MEDICARE

## 2023-03-03 DIAGNOSIS — I10 ESSENTIAL HYPERTENSION, BENIGN: ICD-10-CM

## 2023-03-03 LAB
ALBUMIN SERPL BCP-MCNC: 4.1 G/DL (ref 3.2–4.9)
ALBUMIN/GLOB SERPL: 1.8 G/DL
ALP SERPL-CCNC: 48 U/L (ref 30–99)
ALT SERPL-CCNC: 21 U/L (ref 2–50)
ANION GAP SERPL CALC-SCNC: 9 MMOL/L (ref 7–16)
APPEARANCE UR: CLEAR
AST SERPL-CCNC: 27 U/L (ref 12–45)
BACTERIA #/AREA URNS HPF: NEGATIVE /HPF
BASOPHILS # BLD AUTO: 0.8 % (ref 0–1.8)
BASOPHILS # BLD: 0.03 K/UL (ref 0–0.12)
BILIRUB SERPL-MCNC: 0.9 MG/DL (ref 0.1–1.5)
BILIRUB UR QL STRIP.AUTO: NEGATIVE
BUN SERPL-MCNC: 13 MG/DL (ref 8–22)
CALCIUM ALBUM COR SERPL-MCNC: 9.3 MG/DL (ref 8.5–10.5)
CALCIUM SERPL-MCNC: 9.4 MG/DL (ref 8.5–10.5)
CHLORIDE SERPL-SCNC: 104 MMOL/L (ref 96–112)
CHOLEST SERPL-MCNC: 177 MG/DL (ref 100–199)
CO2 SERPL-SCNC: 26 MMOL/L (ref 20–33)
COLOR UR: YELLOW
CREAT SERPL-MCNC: 1 MG/DL (ref 0.5–1.4)
EOSINOPHIL # BLD AUTO: 0.07 K/UL (ref 0–0.51)
EOSINOPHIL NFR BLD: 1.8 % (ref 0–6.9)
EPI CELLS #/AREA URNS HPF: NEGATIVE /HPF
ERYTHROCYTE [DISTWIDTH] IN BLOOD BY AUTOMATED COUNT: 44.3 FL (ref 35.9–50)
FASTING STATUS PATIENT QL REPORTED: NORMAL
GFR SERPLBLD CREATININE-BSD FMLA CKD-EPI: 78 ML/MIN/1.73 M 2
GLOBULIN SER CALC-MCNC: 2.3 G/DL (ref 1.9–3.5)
GLUCOSE SERPL-MCNC: 104 MG/DL (ref 65–99)
GLUCOSE UR STRIP.AUTO-MCNC: NEGATIVE MG/DL
HCT VFR BLD AUTO: 42.7 % (ref 42–52)
HDLC SERPL-MCNC: 97 MG/DL
HGB BLD-MCNC: 14.7 G/DL (ref 14–18)
HYALINE CASTS #/AREA URNS LPF: ABNORMAL /LPF
IMM GRANULOCYTES # BLD AUTO: 0.01 K/UL (ref 0–0.11)
IMM GRANULOCYTES NFR BLD AUTO: 0.3 % (ref 0–0.9)
KETONES UR STRIP.AUTO-MCNC: NEGATIVE MG/DL
LDLC SERPL CALC-MCNC: 69 MG/DL
LEUKOCYTE ESTERASE UR QL STRIP.AUTO: ABNORMAL
LYMPHOCYTES # BLD AUTO: 1.03 K/UL (ref 1–4.8)
LYMPHOCYTES NFR BLD: 25.9 % (ref 22–41)
MCH RBC QN AUTO: 32.4 PG (ref 27–33)
MCHC RBC AUTO-ENTMCNC: 34.4 G/DL (ref 33.7–35.3)
MCV RBC AUTO: 94.1 FL (ref 81.4–97.8)
MICRO URNS: ABNORMAL
MONOCYTES # BLD AUTO: 0.38 K/UL (ref 0–0.85)
MONOCYTES NFR BLD AUTO: 9.6 % (ref 0–13.4)
NEUTROPHILS # BLD AUTO: 2.45 K/UL (ref 1.82–7.42)
NEUTROPHILS NFR BLD: 61.6 % (ref 44–72)
NITRITE UR QL STRIP.AUTO: NEGATIVE
NRBC # BLD AUTO: 0 K/UL
NRBC BLD-RTO: 0 /100 WBC
PH UR STRIP.AUTO: 6 [PH] (ref 5–8)
PLATELET # BLD AUTO: 209 K/UL (ref 164–446)
PMV BLD AUTO: 9.2 FL (ref 9–12.9)
POTASSIUM SERPL-SCNC: 4.9 MMOL/L (ref 3.6–5.5)
PROT SERPL-MCNC: 6.4 G/DL (ref 6–8.2)
PROT UR QL STRIP: NEGATIVE MG/DL
RBC # BLD AUTO: 4.54 M/UL (ref 4.7–6.1)
RBC # URNS HPF: ABNORMAL /HPF
RBC UR QL AUTO: NEGATIVE
SODIUM SERPL-SCNC: 139 MMOL/L (ref 135–145)
SP GR UR STRIP.AUTO: 1.01
TRIGL SERPL-MCNC: 57 MG/DL (ref 0–149)
UROBILINOGEN UR STRIP.AUTO-MCNC: 0.2 MG/DL
WBC # BLD AUTO: 4 K/UL (ref 4.8–10.8)
WBC #/AREA URNS HPF: ABNORMAL /HPF

## 2023-03-03 PROCEDURE — 36415 COLL VENOUS BLD VENIPUNCTURE: CPT

## 2023-03-03 PROCEDURE — 80053 COMPREHEN METABOLIC PANEL: CPT

## 2023-03-03 PROCEDURE — 80061 LIPID PANEL: CPT

## 2023-03-03 PROCEDURE — 85025 COMPLETE CBC W/AUTO DIFF WBC: CPT

## 2023-03-03 PROCEDURE — 87086 URINE CULTURE/COLONY COUNT: CPT

## 2023-03-03 PROCEDURE — 81001 URINALYSIS AUTO W/SCOPE: CPT

## 2023-03-05 LAB
BACTERIA UR CULT: NORMAL
SIGNIFICANT IND 70042: NORMAL
SITE SITE: NORMAL
SOURCE SOURCE: NORMAL

## 2023-03-09 ENCOUNTER — OFFICE VISIT (OUTPATIENT)
Dept: MEDICAL GROUP | Facility: PHYSICIAN GROUP | Age: 78
End: 2023-03-09
Payer: MEDICARE

## 2023-03-09 VITALS
SYSTOLIC BLOOD PRESSURE: 124 MMHG | TEMPERATURE: 98.2 F | HEIGHT: 71 IN | WEIGHT: 176 LBS | BODY MASS INDEX: 24.64 KG/M2 | RESPIRATION RATE: 18 BRPM | HEART RATE: 72 BPM | OXYGEN SATURATION: 98 % | DIASTOLIC BLOOD PRESSURE: 76 MMHG

## 2023-03-09 DIAGNOSIS — E78.00 PURE HYPERCHOLESTEROLEMIA: ICD-10-CM

## 2023-03-09 DIAGNOSIS — H53.47 HEMIANOPSIA: ICD-10-CM

## 2023-03-09 DIAGNOSIS — I48.0 PAF (PAROXYSMAL ATRIAL FIBRILLATION) (HCC): ICD-10-CM

## 2023-03-09 DIAGNOSIS — I10 ESSENTIAL HYPERTENSION, BENIGN: ICD-10-CM

## 2023-03-09 DIAGNOSIS — Z23 NEED FOR VACCINATION: ICD-10-CM

## 2023-03-09 PROBLEM — Z02.4 DRIVER'S PERMIT PHYSICAL EXAMINATION: Status: RESOLVED | Noted: 2021-07-15 | Resolved: 2023-03-09

## 2023-03-09 PROCEDURE — 90715 TDAP VACCINE 7 YRS/> IM: CPT | Performed by: FAMILY MEDICINE

## 2023-03-09 PROCEDURE — 99214 OFFICE O/P EST MOD 30 MIN: CPT | Mod: 25 | Performed by: FAMILY MEDICINE

## 2023-03-09 PROCEDURE — 90471 IMMUNIZATION ADMIN: CPT | Performed by: FAMILY MEDICINE

## 2023-03-09 ASSESSMENT — FIBROSIS 4 INDEX: FIB4 SCORE: 2.14

## 2023-03-09 NOTE — PROGRESS NOTES
Subjective:     CC: Here for follow-up and to go over his lab work.    HPI:   Babar presents today with the following medical concerns:    Essential hypertension, benign  This is a chronic problem.  Blood pressure remains under good control.  Continue on current medications.  Recent lab results reviewed with patient.    Hemianopsia  This is a chronic problem.  Is residual from his stroke in .    Hyperlipidemia  This is a chronic problem under good control.  He is on atorvastatin.  Last cholesterol level was very good.    PAF (paroxysmal atrial fibrillation)  This is a chronic problem.  He is followed by cardiology.  He is on appropriate treatment.    Past Medical History:   Diagnosis Date    Allergy     History of CVA (cerebrovascular accident) 2015    Hyperglycemia 9/3/2019    Medicare annual wellness visit, subsequent 2015    PAF (paroxysmal atrial fibrillation) (HCC) 2015    Stroke (HCC)     TIA (transient ischemic attack)        Social History     Tobacco Use    Smoking status: Former     Packs/day: 0.50     Years: 2.00     Pack years: 1.00     Types: Cigarettes     Quit date: 1969     Years since quittin.2    Smokeless tobacco: Never    Tobacco comments:     Started smoking at age 22   Vaping Use    Vaping Use: Never used   Substance Use Topics    Alcohol use: Yes     Alcohol/week: 4.2 oz     Types: 7 Glasses of wine per week     Comment: 2 glass of wine/night    Drug use: Never       Current Outpatient Medications Ordered in Epic   Medication Sig Dispense Refill    Glucos-Chondroit-Hyaluron-MSM (GLUCOSAMINE CHONDROITIN JOINT PO) Take  by mouth.      metoprolol SR (TOPROL XL) 50 MG TABLET SR 24 HR Take 50 mg by mouth every day.      ramipril (ALTACE) 2.5 MG Cap Take 2.5 mg by mouth every day.      atorvastatin (LIPITOR) 10 MG TABS Take 10 mg by mouth every evening.      rivaroxaban (XARELTO) 20 MG Tab tablet Take 20 mg by mouth with dinner.       No current Epic-ordered  "facility-administered medications on file.       Allergies:  Patient has no known allergies.    Health Maintenance: Completed    ROS:  Gen: no fevers/chills, no changes in weight  ENT: no sore throat, no hearing loss, no bloody nose  Pulm: no sob, no cough  CV: no chest pain, no palpitations  GI: no nausea/vomiting, no diarrhea  : no dysuria  MSk: no myalgias  Skin: no rash  Neuro: no headaches, no numbness/tingling  Heme/Lymph: no easy bruising      Objective:       Exam:  /76 (BP Location: Right arm, Patient Position: Sitting, BP Cuff Size: Adult)   Pulse 72   Temp 36.8 °C (98.2 °F) (Temporal)   Resp 18   Ht 1.803 m (5' 11\")   Wt 79.8 kg (176 lb)   SpO2 98%   BMI 24.55 kg/m²  Body mass index is 24.55 kg/m².    Gen: Alert and oriented, No apparent distress.  Ears:   Ear canals and TMs are clear.  Neck: Neck is supple without lymphadenopathy.  Thyroid exam is normal.  Lungs: Normal effort, CTA bilaterally, no wheezes, rhonchi, or rales  CV: Regular rate and rhythm. No murmurs, rubs, or gallops.  Abdomen: Soft, nontender, no organomegaly masses.  Normal bowel sounds.  Ext: No clubbing, cyanosis, edema.  Neuro: Cranial nerves II through VIII are grossly intact except for absence of vision on the left eye.  Gait is normal.  Psych: Patient is alert oriented x3.  No unusual thought was expressed.  Insight and judgment is good.  Labs: Results reviewed with patient    Assessment & Plan:     78 y.o. male with the following -     1. Need for vaccination  Vaccine updated today.  - Tdap Vaccine =>8YO IM    2. Essential hypertension, benign  This is a chronic stable condition.  Continue current medications.    3. PAF (paroxysmal atrial fibrillation) (HCC)  This is a chronic problem.  Continue care through his cardiologist.    4. Hemianopsia  This is a chronic problem.  Residual from his stroke.  Continue to monitor.    5. Pure hypercholesterolemia  This is a chronic problem.  Continue statin.      Return in about " 6 months (around 9/9/2023) for Long.    Please note that this dictation was created using voice recognition software. I have made every reasonable attempt to correct obvious errors, but I expect that there are errors of grammar and possibly content that I did not discover before finalizing the note.

## 2023-03-31 NOTE — ASSESSMENT & PLAN NOTE
This is a chronic problem under good control.  He is on atorvastatin.  Last cholesterol level was very good.  
This is a chronic problem.  Blood pressure remains under good control.  Continue on current medications.  Recent lab results reviewed with patient.  
This is a chronic problem.  He is followed by cardiology.  He is on appropriate treatment.  
This is a chronic problem.  Is residual from his stroke in 2015.  
[FreeTextEntry1] : Pt is a 69y/o F, current smoker with asthma/COPD, HTN, HLD, fibromyalgia, inflammatory arthritis on methotrexate, duloxetine (followed by rheum), b/l knee OA, R sternoclavicular arthritis, hepatic steatosis, anxiety, depression (follows outpatient psychiatry and psychology), and colonic polyps, who present in office today for a f/u visit.\par \par Pt reports chronic cough, that she knows is directly related to her smoking 1 pack of cigarettes daily. States she has tried quitting before and was able to stop for 2 years, however she is now distressed by her addition to cigarettes and it's negative affect on her life. Reports recently trying to quit using Nicotine gum, and encouraging her grandchildren to writer her a letter stating why they want her to quit smoking. Reports an episode two weeks ago where she was gasping for air, utilized a nebulizer treatment, and immediately smoked afterwards.  \par \par She does comply with yearly Lung CA screenings, last one in 10/2022 with no significant findings. Currently denies SOB, fever, chest pain, or episodes of coughing up blood. \par \par Plan:\par \par -Nicotrol Inhaler ordered\par -Encouraged to use Nicotine gum for breakthrough urges\par -PFT testing \par -F/U CT scan in October\par -Continued Symbicort use 2X dly\par - Albuterol as needed\par \par Teaching and instruction provided as pt had been using the Albuterol 2X dly and Symbicort PRN. Pt verbalized understanding of teaching.\par \par

## 2023-07-31 DIAGNOSIS — I48.0 PAF (PAROXYSMAL ATRIAL FIBRILLATION) (HCC): ICD-10-CM

## 2023-09-15 ENCOUNTER — TELEPHONE (OUTPATIENT)
Dept: HEALTH INFORMATION MANAGEMENT | Facility: OTHER | Age: 78
End: 2023-09-15
Payer: MEDICARE

## 2023-09-15 NOTE — TELEPHONE ENCOUNTER
1. Caller Name: Babar Willett                          Call Back Number: [phone]  How would the patient prefer to be contacted with a response: MyChart message    2. SPECIFIC Action To Be Taken: Referral pending, please sign.    3. Diagnosis/Clinical Reason for Request: audiology annual exam    4. Specialty & Provider Name/Lab/Imaging Location:  Dr. Esqueda      5. Is appointment scheduled for requested order/referral: no    Patient was informed they will receive a return phone call from the office ONLY if there are any questions before processing their request. Advised to call back if they haven't received a call from the referral department in 5 days.    MyChart message from Patient:  Babar Willett would like to request a referral.  Reason: audiology annual exam  Requested provider: Dr. Esqueda  Comment:  Please send Dr. Esqueda a referral for my annual audiology exam.  Thank you.

## 2023-09-18 NOTE — TELEPHONE ENCOUNTER
Phone call to pt to schedule appt with PCP to discuss referral, per PCP request by Mitchell reply to Telephone encounter. Member was driving and he will self schedule a PCP appt via [facility]

## 2023-10-04 ENCOUNTER — OFFICE VISIT (OUTPATIENT)
Dept: MEDICAL GROUP | Facility: PHYSICIAN GROUP | Age: 78
End: 2023-10-04
Payer: MEDICARE

## 2023-10-04 VITALS
OXYGEN SATURATION: 96 % | WEIGHT: 174 LBS | DIASTOLIC BLOOD PRESSURE: 70 MMHG | HEIGHT: 71 IN | HEART RATE: 74 BPM | RESPIRATION RATE: 18 BRPM | TEMPERATURE: 97.9 F | BODY MASS INDEX: 24.36 KG/M2 | SYSTOLIC BLOOD PRESSURE: 124 MMHG

## 2023-10-04 DIAGNOSIS — H90.3 SENSORINEURAL HEARING LOSS (SNHL) OF BOTH EARS: ICD-10-CM

## 2023-10-04 DIAGNOSIS — Z00.00 WELLNESS EXAMINATION: ICD-10-CM

## 2023-10-04 PROBLEM — Z87.74 HISTORY OF REPAIR OF CONGENITAL ATRIAL SEPTAL DEFECT (ASD): Status: ACTIVE | Noted: 2023-04-11

## 2023-10-04 PROBLEM — I42.8 NONISCHEMIC CARDIOMYOPATHY (HCC): Status: ACTIVE | Noted: 2023-04-11

## 2023-10-04 PROBLEM — I07.1 MODERATE TRICUSPID VALVE REGURGITATION: Status: ACTIVE | Noted: 2023-04-11

## 2023-10-04 PROCEDURE — 3078F DIAST BP <80 MM HG: CPT | Performed by: FAMILY MEDICINE

## 2023-10-04 PROCEDURE — G0439 PPPS, SUBSEQ VISIT: HCPCS | Performed by: FAMILY MEDICINE

## 2023-10-04 PROCEDURE — 3074F SYST BP LT 130 MM HG: CPT | Performed by: FAMILY MEDICINE

## 2023-10-04 ASSESSMENT — FIBROSIS 4 INDEX: FIB4 SCORE: 2.14

## 2023-10-04 ASSESSMENT — ENCOUNTER SYMPTOMS: GENERAL WELL-BEING: EXCELLENT

## 2023-10-04 ASSESSMENT — PATIENT HEALTH QUESTIONNAIRE - PHQ9: CLINICAL INTERPRETATION OF PHQ2 SCORE: 0

## 2023-10-04 ASSESSMENT — ACTIVITIES OF DAILY LIVING (ADL): BATHING_REQUIRES_ASSISTANCE: 0

## 2023-10-04 NOTE — PROGRESS NOTES
Chief Complaint   Patient presents with    Annual Wellness Visit       HPI:  Babar Willett is a 78 y.o. here for Medicare Annual Wellness Visit   Wellness examination  Patient is here today for his Medicare wellness exam.  He is having no major concerns on today's visit.  He does need a referral for his audiologist.  He does have hearing aids in place and has high-frequency hearing loss.  His labs are current.  He also has been having symptoms of frequent urination with weaker stream.  His PSA is 3.5 so suspect he has BPH.  He is not interested in referrals or medications at this time.    Sensorineural hearing loss (SNHL) of both ears  This is a chronic problem.  Patient does have hearing aids but needs a referral related to this.    Patient Active Problem List    Diagnosis Date Noted    History of repair of congenital atrial septal defect (ASD) 04/11/2023    Moderate tricuspid valve regurgitation 04/11/2023    Nonischemic cardiomyopathy (HCC) 04/11/2023    History of adenomatous polyp of colon 02/15/2022    Secondary hypercoagulability disorder (HCC) 07/15/2021    Hemianopsia 07/15/2021    Hyperlipidemia 02/22/2016    Essential hypertension, benign 08/21/2015    History of CVA (cerebrovascular accident) 02/20/2015    PAF (paroxysmal atrial fibrillation) (HCC) 02/20/2015       Current Outpatient Medications   Medication Sig Dispense Refill    Glucos-Chondroit-Hyaluron-MSM (GLUCOSAMINE CHONDROITIN JOINT PO) Take  by mouth.      metoprolol SR (TOPROL XL) 50 MG TABLET SR 24 HR Take 50 mg by mouth every day.      ramipril (ALTACE) 2.5 MG Cap Take 2.5 mg by mouth every day.      atorvastatin (LIPITOR) 10 MG TABS Take 10 mg by mouth every evening.      rivaroxaban (XARELTO) 20 MG Tab tablet Take 20 mg by mouth with dinner.       No current facility-administered medications for this visit.          Current supplements as per medication list.     Allergies: Patient has no known allergies.    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 Friends with neighbors      He  reports that he quit smoking about 54 years ago. His smoking use included cigarettes. He started smoking about 56 years ago. He has a 1.0 pack-year smoking history. He has never used smokeless tobacco. He reports current alcohol use of about 4.2 oz of alcohol per week. He reports that he does not use drugs.  Counseling given: Not Answered  Tobacco comments: Started smoking at age 22      ROS:    Gait: Uses no assistive device  Ostomy: No  Other tubes: No  Amputations: No  Chronic oxygen use: No  Last eye exam: march 2023  Wears hearing aids: Yes   : Denies any urinary leakage during the last 6 months    Screening:  done  Depression Screening  Little interest or pleasure in doing things?  0 - not at all  Feeling down, depressed , or hopeless? 0 - not at all  Patient Health Questionnaire Score: 0     If depressive symptoms identified deferred to follow up visit unless specifically addressed in assessment and plan.    Interpretation of PHQ-9 Total Score   Score Severity   1-4 No Depression   5-9 Mild Depression   10-14 Moderate Depression   15-19 Moderately Severe Depression   20-27 Severe Depression    Screening for Cognitive Impairment  Do you or any of your friends or family members have any concern about your memory? No  Three Minute Recall (Banana, Sunrise, Chair) 3/3 3/3 banana, sunrise, and chair  [place]rell clock face with all 12 numbers and set the hands to show 20 past 8.  Yes 5/5   Cognitive concerns identified deferred for follow up unless specifically addressed in assessment and plan.    Fall Risk Assessment  Has the patient had two or more falls in the last year or any fall with injury in the last year?  No    Safety Assessment  Do you always wear your seatbelt?  Yes  Any changes to home needed to function safely? No  Difficulty hearing.  Yes  Patient counseled about all safety risks that were  identified.    Functional Assessment ADLs  Are there any barriers preventing you from cooking for yourself or meeting nutritional needs?  No.    Are there any barriers preventing you from driving safely or obtaining transportation?  No.    Are there any barriers preventing you from using a telephone or calling for help?  No    Are there any barriers preventing you from shopping?  No.    Are there any barriers preventing you from taking care of your own finances?  No    Are there any barriers preventing you from managing your medications?  No    Are there any barriers preventing you from showering, bathing or dressing yourself? No    Are there any barriers preventing you from doing housework or laundry? No  Are there any barriers preventing you from using the toilet?No  Are you currently engaging in any exercise or physical activity?  Yes. Bikes in the mornings    Self-Assessment of Health  What is your perception of your health? Excellent  Do you sleep more than six hours a night? Yes  In the past 7 days, how much did pain keep you from doing your normal work? None  Do you spend quality time with family or friends (virtually or in person)? Yes  Do you usually eat a heart healthy diet that constists of a variety of fruits, vegetables, whole grains and fiber? Yes  Do you eat foods high in fat and/or Fast Food more than three times per week? No    Advance Care Planning  Do you have an Advance Directive, Living Will, Durable Power of , or POLST? Yes  Advance Directive       is not on file - instructed patient to bring in a copy to scan into their chart      Health Maintenance Summary            Overdue - Annual Wellness Visit (Every 366 Days) Overdue since 7/16/2022      07/15/2021  Prob Dx: Medicare annual wellness visit, subsequent    03/23/2021  Done    03/23/2021  Visit Dx: Medicare annual wellness visit, subsequent    03/10/2020  Done    03/10/2020  Visit Dx: Medicare annual wellness visit, subsequent     Only the first 5 history entries have been loaded, but more history exists.              Overdue - Influenza Vaccine (1) Overdue since 9/1/2023 09/25/2022  Imm Admin: Influenza, Unspecified - HISTORICAL DATA    10/08/2021  Imm Admin: Influenza Vaccine Adult HD    11/15/2020  Imm Admin: Influenza, Unspecified - HISTORICAL DATA    10/04/2020  Imm Admin: Influenza Vaccine Quad Inj (Pf)    09/29/2019  Imm Admin: Influenza, Unspecified - HISTORICAL DATA    Only the first 5 history entries have been loaded, but more history exists.              Postponed - COVID-19 Vaccine (3 - Pfizer series) Postponed until 10/4/2024      03/16/2021  Imm Admin: PFIZER PURPLE CAP SARS-COV-2 VACCINATION (12+)    02/23/2021  Imm Admin: PFIZER PURPLE CAP SARS-COV-2 VACCINATION (12+)              IMM DTaP/Tdap/Td Vaccine (3 - Td or Tdap) Next due on 3/9/2033      03/09/2023  Imm Admin: Tdap Vaccine    02/05/2013  Imm Admin: Tdap Vaccine              Zoster (Shingles) Vaccines (Series Information) Completed      05/18/2019  Imm Admin: Zoster Vaccine Recombinant (RZV) (SHINGRIX)    02/24/2019  Imm Admin: Zoster Vaccine Recombinant (RZV) (SHINGRIX)              Pneumococcal Vaccine: 65+ Years (Series Information) Completed      05/18/2019  Imm Admin: Pneumococcal polysaccharide vaccine (PPSV-23)    10/17/2015  Imm Admin: Pneumococcal Conjugate Vaccine (Prevnar/PCV-13)    12/30/2014  Imm Admin: Pneumococcal polysaccharide vaccine (PPSV-23)    09/20/2014  Imm Admin: Pneumococcal polysaccharide vaccine (PPSV-23)    09/17/2014  Imm Admin: Pneumococcal polysaccharide vaccine (PPSV-23)              Hepatitis C Screening  Tentatively Complete      03/02/2020  Hepatitis C Antibody component of HEP C VIRUS ANTIBODY              Hepatitis A Vaccine (Hep A) (Series Information) Aged Out      No completion history exists for this topic.              HPV Vaccines (Series Information) Aged Out      No completion history exists for this topic.               Polio Vaccine (Inactivated Polio) (Series Information) Aged Out      No completion history exists for this topic.              Meningococcal Immunization (Series Information) Aged Out      No completion history exists for this topic.              Discontinued - Colorectal Cancer Screening  Discontinued        Frequency changed to Never automatically (Topic No Longer Applies)    2022  AMB EXTERNAL COLONOSCOPY RESULTS    2022  AMB EXTERNAL COLONOSCOPY RESULTS    2021  REFERRAL TO GI FOR COLONOSCOPY    2021  REFERRAL TO GI FOR COLONOSCOPY    Only the first 5 history entries have been loaded, but more history exists.              Discontinued - Hepatitis B Vaccine (Hep B)  Discontinued      No completion history exists for this topic.                    Patient Care Team:  Brian West III, M.D. as PCP - General (Family Medicine)  Chava Velez D.O. as Consulting Physician (Cardiovascular Disease (Cardiology))  Deborah Alan O.D. as Consulting Physician (Optometry)  NATHAN Darden as Consulting Physician (Audiologist)  Howard Mckeon M.D. as Consulting Physician (Orthopedic Surgery)        Social History     Tobacco Use    Smoking status: Former     Current packs/day: 0.00     Average packs/day: 0.5 packs/day for 2.0 years (1.0 ttl pk-yrs)     Types: Cigarettes     Start date: 1967     Quit date: 1969     Years since quittin.7    Smokeless tobacco: Never    Tobacco comments:     Started smoking at age 22   Vaping Use    Vaping Use: Never used   Substance Use Topics    Alcohol use: Yes     Alcohol/week: 4.2 oz     Types: 7 Glasses of wine per week     Comment: 2 glass of wine/night    Drug use: Never     Family History   Problem Relation Age of Onset    Heart Disease Father         Arteriosclerosis     Heart Attack Father     Arthritis Sister     No Known Problems Brother     Other Mother         Gluacoma    Stroke Brother         TIA    Prostate cancer  "Brother     Heart Disease Sister         Pacemaker    Other Sister         Cataract    Other Brother         Down syndrome    Suicide Attempts Brother     Depression Brother     Cancer Maternal Aunt         intestinal     No Known Problems Daughter      He  has a past medical history of Allergy, History of CVA (cerebrovascular accident) (2/20/2015), Hyperglycemia (9/3/2019), Medicare annual wellness visit, subsequent (2/20/2015), PAF (paroxysmal atrial fibrillation) (HCC) (2/20/2015), Stroke (HCC), and TIA (transient ischemic attack).   Past Surgical History:   Procedure Laterality Date    OTHER CARDIAC SURGERY  01/16/2015    amplatzer septal occluder    ARTHROPLASTY Left     left knee replacement    EYE SURGERY      INGUINAL HERNIA REPAIR      Hernia Repair, Inguinal    KNEE ARTHROSCOPY      VASECTOMY         Exam:   /70 (BP Location: Left arm, Patient Position: Sitting, BP Cuff Size: Adult)   Pulse 74   Temp 36.6 °C (97.9 °F) (Temporal)   Resp 18   Ht 1.803 m (5' 11\")   Wt 78.9 kg (174 lb)   SpO2 96%  Body mass index is 24.27 kg/m².    Hearing satisfactory.    Dentition good  Alert, oriented in no acute distress.  Eye contact is good, speech goal directed, affect calm    Assessment and Plan. The following treatment and monitoring plan is recommended:    1. Sensorineural hearing loss (SNHL) of both ears  This is a chronic problem.  Referral made.  - Referral to Audiology    2. Wellness examination  Patient had his wellness exam today and he passed on all the testing.  We will see him back next year for his annual exam and labs.      Services suggested: No services needed at this time  Health Care Screening: Age-appropriate preventive services recommended by USPTF and ACIP covered by Medicare were discussed today. Services ordered if indicated and agreed upon by the patient.  Referrals offered: Community-based lifestyle interventions to reduce health risks and promote self-management and wellness, fall " prevention, nutrition, physical activity, tobacco-use cessation, weight loss, and mental health services as per orders if indicated.    Discussion today about general wellness and lifestyle habits:    Prevent falls and reduce trip hazards; Cautioned about securing or removing rugs.  Have a working fire alarm and carbon monoxide detector;   Engage in regular physical activity and social activities     Follow-up: No follow-ups on file.

## 2023-10-04 NOTE — ASSESSMENT & PLAN NOTE
Patient is here today for his Medicare wellness exam.  He is having no major concerns on today's visit.  He does need a referral for his audiologist.  He does have hearing aids in place and has high-frequency hearing loss.  His labs are current.  He also has been having symptoms of frequent urination with weaker stream.  His PSA is 3.5 so suspect he has BPH.  He is not interested in referrals or medications at this time.

## 2023-10-09 ENCOUNTER — TELEPHONE (OUTPATIENT)
Dept: HEALTH INFORMATION MANAGEMENT | Facility: OTHER | Age: 78
End: 2023-10-09

## 2023-10-12 ENCOUNTER — APPOINTMENT (RX ONLY)
Dept: URBAN - METROPOLITAN AREA CLINIC 4 | Facility: CLINIC | Age: 78
Setting detail: DERMATOLOGY
End: 2023-10-12

## 2023-10-12 DIAGNOSIS — L82.1 OTHER SEBORRHEIC KERATOSIS: ICD-10-CM

## 2023-10-12 DIAGNOSIS — L82.0 INFLAMED SEBORRHEIC KERATOSIS: ICD-10-CM

## 2023-10-12 DIAGNOSIS — D18.0 HEMANGIOMA: ICD-10-CM

## 2023-10-12 DIAGNOSIS — D22 MELANOCYTIC NEVI: ICD-10-CM

## 2023-10-12 DIAGNOSIS — Z85.828 PERSONAL HISTORY OF OTHER MALIGNANT NEOPLASM OF SKIN: ICD-10-CM

## 2023-10-12 DIAGNOSIS — L57.0 ACTINIC KERATOSIS: ICD-10-CM

## 2023-10-12 DIAGNOSIS — L81.4 OTHER MELANIN HYPERPIGMENTATION: ICD-10-CM

## 2023-10-12 PROBLEM — D18.01 HEMANGIOMA OF SKIN AND SUBCUTANEOUS TISSUE: Status: ACTIVE | Noted: 2023-10-12

## 2023-10-12 PROBLEM — D22.5 MELANOCYTIC NEVI OF TRUNK: Status: ACTIVE | Noted: 2023-10-12

## 2023-10-12 PROBLEM — D48.5 NEOPLASM OF UNCERTAIN BEHAVIOR OF SKIN: Status: ACTIVE | Noted: 2023-10-12

## 2023-10-12 PROCEDURE — ? OBSERVATION

## 2023-10-12 PROCEDURE — ? BIOPSY BY SHAVE METHOD

## 2023-10-12 PROCEDURE — 17003 DESTRUCT PREMALG LES 2-14: CPT

## 2023-10-12 PROCEDURE — 17000 DESTRUCT PREMALG LESION: CPT | Mod: 59

## 2023-10-12 PROCEDURE — 11102 TANGNTL BX SKIN SINGLE LES: CPT

## 2023-10-12 PROCEDURE — ? LIQUID NITROGEN

## 2023-10-12 PROCEDURE — ? COUNSELING

## 2023-10-12 PROCEDURE — ? LIQUID NITROGEN (COSMETIC)

## 2023-10-12 PROCEDURE — 99213 OFFICE O/P EST LOW 20 MIN: CPT | Mod: 25

## 2023-10-12 ASSESSMENT — LOCATION SIMPLE DESCRIPTION DERM
LOCATION SIMPLE: RIGHT FOREARM
LOCATION SIMPLE: LEFT ZYGOMA
LOCATION SIMPLE: RIGHT LOWER BACK
LOCATION SIMPLE: LEFT UPPER ARM
LOCATION SIMPLE: LEFT POSTERIOR THIGH
LOCATION SIMPLE: RIGHT CHEEK
LOCATION SIMPLE: UPPER BACK
LOCATION SIMPLE: RIGHT POSTERIOR THIGH
LOCATION SIMPLE: LEFT FOREARM
LOCATION SIMPLE: SCALP
LOCATION SIMPLE: LEFT CHEEK
LOCATION SIMPLE: RIGHT UPPER BACK

## 2023-10-12 ASSESSMENT — LOCATION ZONE DERM
LOCATION ZONE: ARM
LOCATION ZONE: FACE
LOCATION ZONE: TRUNK
LOCATION ZONE: SCALP
LOCATION ZONE: LEG

## 2023-10-12 ASSESSMENT — LOCATION DETAILED DESCRIPTION DERM
LOCATION DETAILED: LEFT CENTRAL ZYGOMA
LOCATION DETAILED: RIGHT INFERIOR CENTRAL MALAR CHEEK
LOCATION DETAILED: LEFT CENTRAL MANDIBULAR CHEEK
LOCATION DETAILED: INFERIOR THORACIC SPINE
LOCATION DETAILED: RIGHT PROXIMAL DORSAL FOREARM
LOCATION DETAILED: RIGHT SUPERIOR UPPER BACK
LOCATION DETAILED: SUPERIOR THORACIC SPINE
LOCATION DETAILED: RIGHT SUPERIOR MEDIAL MIDBACK
LOCATION DETAILED: LEFT DISTAL POSTERIOR UPPER ARM
LOCATION DETAILED: RIGHT INFERIOR FRONTAL SCALP
LOCATION DETAILED: RIGHT DISTAL POSTERIOR THIGH
LOCATION DETAILED: RIGHT PROXIMAL RADIAL DORSAL FOREARM
LOCATION DETAILED: LEFT DISTAL POSTERIOR THIGH
LOCATION DETAILED: LEFT PROXIMAL DORSAL FOREARM
LOCATION DETAILED: RIGHT SUPERIOR CENTRAL MALAR CHEEK
LOCATION DETAILED: RIGHT PROXIMAL LATERAL POSTERIOR THIGH
LOCATION DETAILED: LEFT MEDIAL ZYGOMA

## 2023-10-12 NOTE — PROCEDURE: LIQUID NITROGEN (COSMETIC)
Post-Care Instructions: I reviewed with the patient in detail post-care instructions. Patient is to wear sunprotection, and avoid picking at any of the treated lesions. Pt may apply Vaseline to crusted or scabbing areas.
Render Post-Care Instructions In Note?: no
Price (Use Numbers Only, No Special Characters Or $): 0
Billing Information: Bill by Static Price
Detail Level: Detailed
Consent: The patient's consent was obtained including but not limited to risks of crusting, scabbing, blistering, scarring, darker or lighter pigmentary change, recurrence, incomplete removal and infection. The patient understands that the procedure is cosmetic in nature and is not covered by insurance.
Spray Paint Text: The liquid nitrogen was applied to the skin utilizing a spray paint frosting technique.
Show Spray Paint Technique Variable?: Yes

## 2023-12-09 ENCOUNTER — OFFICE VISIT (OUTPATIENT)
Dept: URGENT CARE | Facility: CLINIC | Age: 78
End: 2023-12-09
Payer: MEDICARE

## 2023-12-09 ENCOUNTER — HOSPITAL ENCOUNTER (OUTPATIENT)
Facility: MEDICAL CENTER | Age: 78
End: 2023-12-09
Payer: MEDICARE

## 2023-12-09 VITALS
DIASTOLIC BLOOD PRESSURE: 74 MMHG | WEIGHT: 175 LBS | BODY MASS INDEX: 24.5 KG/M2 | HEART RATE: 78 BPM | TEMPERATURE: 97.4 F | SYSTOLIC BLOOD PRESSURE: 122 MMHG | HEIGHT: 71 IN | RESPIRATION RATE: 14 BRPM | OXYGEN SATURATION: 98 %

## 2023-12-09 DIAGNOSIS — N30.90 CYSTITIS: ICD-10-CM

## 2023-12-09 DIAGNOSIS — R30.0 DYSURIA: ICD-10-CM

## 2023-12-09 DIAGNOSIS — R39.11 URINARY HESITANCY: ICD-10-CM

## 2023-12-09 LAB
APPEARANCE UR: CLEAR
BILIRUB UR STRIP-MCNC: NEGATIVE MG/DL
COLOR UR AUTO: YELLOW
GLUCOSE UR STRIP.AUTO-MCNC: NEGATIVE MG/DL
KETONES UR STRIP.AUTO-MCNC: NEGATIVE MG/DL
LEUKOCYTE ESTERASE UR QL STRIP.AUTO: NORMAL
NITRITE UR QL STRIP.AUTO: NEGATIVE
PH UR STRIP.AUTO: 6 [PH] (ref 5–8)
PROT UR QL STRIP: NEGATIVE MG/DL
RBC UR QL AUTO: NORMAL
SP GR UR STRIP.AUTO: 1.02
UROBILINOGEN UR STRIP-MCNC: 0.2 MG/DL

## 2023-12-09 PROCEDURE — 87086 URINE CULTURE/COLONY COUNT: CPT

## 2023-12-09 PROCEDURE — 3078F DIAST BP <80 MM HG: CPT

## 2023-12-09 PROCEDURE — 3074F SYST BP LT 130 MM HG: CPT

## 2023-12-09 PROCEDURE — 99213 OFFICE O/P EST LOW 20 MIN: CPT

## 2023-12-09 PROCEDURE — 81002 URINALYSIS NONAUTO W/O SCOPE: CPT

## 2023-12-09 RX ORDER — SULFAMETHOXAZOLE AND TRIMETHOPRIM 800; 160 MG/1; MG/1
1 TABLET ORAL 2 TIMES DAILY
Qty: 6 TABLET | Refills: 0 | Status: SHIPPED | OUTPATIENT
Start: 2023-12-09 | End: 2023-12-12

## 2023-12-09 RX ORDER — TAMSULOSIN HYDROCHLORIDE 0.4 MG/1
CAPSULE ORAL
Qty: 30 CAPSULE | Refills: 3 | Status: SHIPPED | OUTPATIENT
Start: 2023-12-09

## 2023-12-09 ASSESSMENT — FIBROSIS 4 INDEX: FIB4 SCORE: 2.14

## 2023-12-09 NOTE — PROGRESS NOTES
Chief Complaint   Patient presents with    UTI     Sx pain when urinating x last week        HISTORY OF PRESENT ILLNESS: Patient is a pleasant 78 y.o. male who presents to urgent care today for frequency, urgency, hesitancy and pain with urination for the last week.  Patient states he has had ongoing hesitancy issues for several months, he mentioned this to his primary care provider and stopped drinking fluids at night however this remains.  Denies any fevers, no nausea or vomiting, no major abdominal pain, no low back pain.    Patient Active Problem List    Diagnosis Date Noted    Sensorineural hearing loss (SNHL) of both ears 10/04/2023    Wellness examination 10/04/2023    History of repair of congenital atrial septal defect (ASD) 04/11/2023    Moderate tricuspid valve regurgitation 04/11/2023    Nonischemic cardiomyopathy (HCC) 04/11/2023    History of adenomatous polyp of colon 02/15/2022    Secondary hypercoagulability disorder (HCC) 07/15/2021    Hemianopsia 07/15/2021    Hyperlipidemia 02/22/2016    Essential hypertension, benign 08/21/2015    History of CVA (cerebrovascular accident) 02/20/2015    PAF (paroxysmal atrial fibrillation) ([facility]) 02/20/2015       Allergies:Patient has no known allergies.    Current Outpatient Medications Ordered in Epic   Medication Sig Dispense Refill    sulfamethoxazole-trimethoprim (BACTRIM DS) 800-160 MG tablet Take 1 Tablet by mouth 2 times a day for 3 days. 6 Tablet 0    tamsulosin (FLOMAX) 0.4 MG capsule Take 0.4 mg once daily until stone passage occurs or for up to 4 weeks. 30 Capsule 3    Glucos-Chondroit-Hyaluron-MSM (GLUCOSAMINE CHONDROITIN JOINT PO) Take  by mouth.      metoprolol SR (TOPROL XL) 50 MG TABLET SR 24 HR Take 50 mg by mouth every day.      ramipril (ALTACE) 2.5 MG Cap Take 2.5 mg by mouth every day.      atorvastatin (LIPITOR) 10 MG TABS Take 10 mg by mouth every evening.      rivaroxaban (XARELTO) 20 MG Tab tablet Take 20 mg by mouth with dinner.       No  current Epic-ordered facility-administered medications on file.       Past Medical History:   Diagnosis Date    Allergy     History of CVA (cerebrovascular accident) 2015    Hyperglycemia 9/3/2019    Medicare annual wellness visit, subsequent 2015    PAF (paroxysmal atrial fibrillation) (HCC) 2015    Stroke (HCC)     TIA (transient ischemic attack)        Social History     Tobacco Use    Smoking status: Former     Current packs/day: 0.00     Average packs/day: 0.5 packs/day for 2.0 years (1.0 ttl pk-yrs)     Types: Cigarettes     Start date: 1967     Quit date: 1969     Years since quittin.9    Smokeless tobacco: Never    Tobacco comments:     Started smoking at age 22   Vaping Use    Vaping Use: Never used   Substance Use Topics    Alcohol use: Yes     Alcohol/week: 4.2 oz     Types: 7 Glasses of wine per week     Comment: 2 glass of wine/night    Drug use: Never       Family Status   Relation Name Status    Fa 65     Sis Margy Alive    Bro Yehuda Alive    Mo 93  at age 93        Old age    Bro Mor Alive    Sis Shelli Alive    Sis Alicia Alive    Bro Scout 12  at age 12        Down syndrome complications    Bro Rosalino 42  at age 42        suicide    Camila Arvizu Demetris (Not Specified)    Maureen Li Alive        Born 1969     Family History   Problem Relation Age of Onset    Heart Disease Father         Arteriosclerosis     Heart Attack Father     Arthritis Sister     No Known Problems Brother     Other Mother         Gluacoma    Stroke Brother         TIA    Prostate cancer Brother     Heart Disease Sister         Pacemaker    Other Sister         Cataract    Other Brother         Down syndrome    Suicide Attempts Brother     Depression Brother     Cancer Maternal Aunt         intestinal     No Known Problems Daughter        ROS:  Review of Systems   Constitutional: Negative for fever, chills, weight loss, malaise, and fatigue.   Neuro: Negative for  "headache, sensory changes, weakness, seizure, LOC.   Cardiovascular: Negative for chest pain, palpitations, orthopnea and leg swelling.   Respiratory: Negative for cough, sputum production, shortness of breath and wheezing.   Gastrointestinal: Negative for abdominal pain, nausea, vomiting or diarrhea.   Genitourinary: Positive for dysuria, urgency and frequency.  Positive hesitancy  Musculoskeletal: Negative for falls, neck pain, back pain, joint pain, myalgias.   Skin: Negative for rash, diaphoresis.     Exam:  /74 (BP Location: Left arm, Patient Position: Sitting, BP Cuff Size: Adult)   Pulse 78   Temp 36.3 °C (97.4 °F) (Temporal)   Resp 14   Ht 1.803 m (5' 11\")   Wt 79.4 kg (175 lb)   SpO2 98%   General: well-nourished, well-developed male in NAD  Head: normocephalic, atraumatic  Eyes: PERRLA, no conjunctival injection, acuity grossly intact, lids normal.  Ears: normal shape and symmetry, no tenderness, no discharge. External canals are without any significant edema or erythema. Gross auditory acuity is intact.  Nose: symmetrical without tenderness, no discharge.  Mouth/Throat: reasonable hygiene  Neck: no masses, range of motion within normal limits, no tracheal deviation. No obvious thyroid enlargement.   Lymph: no cervical adenopathy. No supraclavicular adenopathy.   Neuro: alert and oriented. No sensory deficit.   Cardiovascular: regular rate and rhythm. No edema.  Pulmonary: no distress. Chest is symmetrical with respiration, no wheezes, crackles, or rhonchi.   Abdomen: soft, non-tender, no guarding, no hepatosplenomegaly.  Negative CVA tenderness  Musculoskeletal: no clubbing, appropriate muscle tone, gait is stable.  Skin: warm, dry, intact, no clubbing, no cyanosis, no rashes.   Psych: appropriate mood, affect, judgement.         Assessment/Plan:  1. Dysuria  sulfamethoxazole-trimethoprim (BACTRIM DS) 800-160 MG tablet    POCT Urinalysis    URINE CULTURE(NEW)      2. Cystitis  " sulfamethoxazole-trimethoprim (BACTRIM DS) 800-160 MG tablet      3. Urinary hesitancy  tamsulosin (FLOMAX) 0.4 MG capsule        Ongoing frequency, urgency and pain with urination for the last several days.  Patient denies any fevers, no no nausea or vomiting, no low back pain.  On physical exam stomach is soft and nontender, negative CVA tenderness.  POCT urinalysis shows leukocytes and blood.  I did go ahead and place patient on Bactrim.  Advised patient to drink plenty of fluids, take Motrin and Tylenol as needed.  In looking at patient's previous PSA it is within normal limits, as patient has attempted to decrease his fluid intake I will go ahead and start him on Flomax, patient advised to follow-up with his primary care provider regarding ongoing refills.  Patient is aware of the plan of care and agreeable at this time, encouraged them to follow-up if they continue to get worse or do not improve. Total time spent with the patient 14 minutes to include, review of chart, charting, assessment, procedure.     Supportive care, differential diagnoses, and indications for immediate follow-up discussed with patient.   Pathogenesis of diagnosis discussed including typical length and natural progression.   Instructed to return to clinic or nearest emergency department for any change in condition, further concerns, or worsening of symptoms.  Patient states understanding of the plan of care and discharge instructions.  Instructed to make an appointment, for follow up, with primary care provider.      Please note that this dictation was created using voice recognition software. I have made every reasonable attempt to correct obvious errors, but I expect that there are errors of grammar and possibly content that I did not discover before finalizing the note.      Cynthia HUSSEIN

## 2023-12-11 LAB
BACTERIA UR CULT: NORMAL
SIGNIFICANT IND 70042: NORMAL
SITE SITE: NORMAL
SOURCE SOURCE: NORMAL

## 2024-01-30 DIAGNOSIS — E78.00 PURE HYPERCHOLESTEROLEMIA: ICD-10-CM

## 2024-01-30 DIAGNOSIS — I10 ESSENTIAL HYPERTENSION, BENIGN: ICD-10-CM

## 2024-01-30 DIAGNOSIS — Z12.5 PROSTATE CANCER SCREENING: ICD-10-CM

## 2024-02-01 ENCOUNTER — HOSPITAL ENCOUNTER (OUTPATIENT)
Dept: LAB | Facility: MEDICAL CENTER | Age: 79
End: 2024-02-01
Attending: FAMILY MEDICINE
Payer: MEDICARE

## 2024-02-01 DIAGNOSIS — I10 ESSENTIAL HYPERTENSION, BENIGN: ICD-10-CM

## 2024-02-01 DIAGNOSIS — Z12.5 PROSTATE CANCER SCREENING: ICD-10-CM

## 2024-02-01 DIAGNOSIS — E78.00 PURE HYPERCHOLESTEROLEMIA: ICD-10-CM

## 2024-02-01 LAB
ALBUMIN SERPL BCP-MCNC: 3.9 G/DL (ref 3.2–4.9)
ALBUMIN/GLOB SERPL: 1.4 G/DL
ALP SERPL-CCNC: 46 U/L (ref 30–99)
ALT SERPL-CCNC: 23 U/L (ref 2–50)
ANION GAP SERPL CALC-SCNC: 10 MMOL/L (ref 7–16)
APPEARANCE UR: CLEAR
AST SERPL-CCNC: 30 U/L (ref 12–45)
BACTERIA #/AREA URNS HPF: NEGATIVE /HPF
BASOPHILS # BLD AUTO: 0.5 % (ref 0–1.8)
BASOPHILS # BLD: 0.02 K/UL (ref 0–0.12)
BILIRUB SERPL-MCNC: 0.6 MG/DL (ref 0.1–1.5)
BILIRUB UR QL STRIP.AUTO: NEGATIVE
BUN SERPL-MCNC: 13 MG/DL (ref 8–22)
CALCIUM ALBUM COR SERPL-MCNC: 9.6 MG/DL (ref 8.5–10.5)
CALCIUM SERPL-MCNC: 9.5 MG/DL (ref 8.5–10.5)
CHLORIDE SERPL-SCNC: 103 MMOL/L (ref 96–112)
CHOLEST SERPL-MCNC: 180 MG/DL (ref 100–199)
CO2 SERPL-SCNC: 25 MMOL/L (ref 20–33)
COLOR UR: YELLOW
CREAT SERPL-MCNC: 0.89 MG/DL (ref 0.5–1.4)
EOSINOPHIL # BLD AUTO: 0.07 K/UL (ref 0–0.51)
EOSINOPHIL NFR BLD: 1.9 % (ref 0–6.9)
EPI CELLS #/AREA URNS HPF: NEGATIVE /HPF
ERYTHROCYTE [DISTWIDTH] IN BLOOD BY AUTOMATED COUNT: 44.5 FL (ref 35.9–50)
FASTING STATUS PATIENT QL REPORTED: NORMAL
GFR SERPLBLD CREATININE-BSD FMLA CKD-EPI: 88 ML/MIN/1.73 M 2
GLOBULIN SER CALC-MCNC: 2.8 G/DL (ref 1.9–3.5)
GLUCOSE SERPL-MCNC: 90 MG/DL (ref 65–99)
GLUCOSE UR STRIP.AUTO-MCNC: NEGATIVE MG/DL
HCT VFR BLD AUTO: 42.4 % (ref 42–52)
HDLC SERPL-MCNC: 99 MG/DL
HGB BLD-MCNC: 14.2 G/DL (ref 14–18)
HYALINE CASTS #/AREA URNS LPF: ABNORMAL /LPF
IMM GRANULOCYTES # BLD AUTO: 0 K/UL (ref 0–0.11)
IMM GRANULOCYTES NFR BLD AUTO: 0 % (ref 0–0.9)
KETONES UR STRIP.AUTO-MCNC: NEGATIVE MG/DL
LDLC SERPL CALC-MCNC: 72 MG/DL
LEUKOCYTE ESTERASE UR QL STRIP.AUTO: ABNORMAL
LYMPHOCYTES # BLD AUTO: 1.17 K/UL (ref 1–4.8)
LYMPHOCYTES NFR BLD: 31.1 % (ref 22–41)
MCH RBC QN AUTO: 31.4 PG (ref 27–33)
MCHC RBC AUTO-ENTMCNC: 33.5 G/DL (ref 32.3–36.5)
MCV RBC AUTO: 93.8 FL (ref 81.4–97.8)
MICRO URNS: ABNORMAL
MONOCYTES # BLD AUTO: 0.45 K/UL (ref 0–0.85)
MONOCYTES NFR BLD AUTO: 12 % (ref 0–13.4)
NEUTROPHILS # BLD AUTO: 2.05 K/UL (ref 1.82–7.42)
NEUTROPHILS NFR BLD: 54.5 % (ref 44–72)
NITRITE UR QL STRIP.AUTO: NEGATIVE
NRBC # BLD AUTO: 0 K/UL
NRBC BLD-RTO: 0 /100 WBC (ref 0–0.2)
PH UR STRIP.AUTO: 6 [PH] (ref 5–8)
PLATELET # BLD AUTO: 206 K/UL (ref 164–446)
PMV BLD AUTO: 9.9 FL (ref 9–12.9)
POTASSIUM SERPL-SCNC: 4.7 MMOL/L (ref 3.6–5.5)
PROT SERPL-MCNC: 6.7 G/DL (ref 6–8.2)
PROT UR QL STRIP: NEGATIVE MG/DL
PSA SERPL-MCNC: 4.63 NG/ML (ref 0–4)
RBC # BLD AUTO: 4.52 M/UL (ref 4.7–6.1)
RBC # URNS HPF: ABNORMAL /HPF
RBC UR QL AUTO: NEGATIVE
SODIUM SERPL-SCNC: 138 MMOL/L (ref 135–145)
SP GR UR STRIP.AUTO: 1.01
TRIGL SERPL-MCNC: 45 MG/DL (ref 0–149)
UROBILINOGEN UR STRIP.AUTO-MCNC: 0.2 MG/DL
WBC # BLD AUTO: 3.8 K/UL (ref 4.8–10.8)
WBC #/AREA URNS HPF: ABNORMAL /HPF

## 2024-02-01 PROCEDURE — 84153 ASSAY OF PSA TOTAL: CPT | Mod: GA

## 2024-02-01 PROCEDURE — 36415 COLL VENOUS BLD VENIPUNCTURE: CPT

## 2024-02-01 PROCEDURE — 80053 COMPREHEN METABOLIC PANEL: CPT

## 2024-02-01 PROCEDURE — 80061 LIPID PANEL: CPT

## 2024-02-01 PROCEDURE — 85025 COMPLETE CBC W/AUTO DIFF WBC: CPT

## 2024-02-01 PROCEDURE — 81001 URINALYSIS AUTO W/SCOPE: CPT

## 2024-02-05 SDOH — ECONOMIC STABILITY: FOOD INSECURITY: WITHIN THE PAST 12 MONTHS, YOU WORRIED THAT YOUR FOOD WOULD RUN OUT BEFORE YOU GOT MONEY TO BUY MORE.: NEVER TRUE

## 2024-02-05 SDOH — HEALTH STABILITY: PHYSICAL HEALTH: ON AVERAGE, HOW MANY MINUTES DO YOU ENGAGE IN EXERCISE AT THIS LEVEL?: 40 MIN

## 2024-02-05 SDOH — ECONOMIC STABILITY: FOOD INSECURITY: WITHIN THE PAST 12 MONTHS, THE FOOD YOU BOUGHT JUST DIDN'T LAST AND YOU DIDN'T HAVE MONEY TO GET MORE.: NEVER TRUE

## 2024-02-05 SDOH — ECONOMIC STABILITY: HOUSING INSECURITY
IN THE LAST 12 MONTHS, WAS THERE A TIME WHEN YOU DID NOT HAVE A STEADY PLACE TO SLEEP OR SLEPT IN A SHELTER (INCLUDING NOW)?: NO

## 2024-02-05 SDOH — ECONOMIC STABILITY: HOUSING INSECURITY: IN THE LAST 12 MONTHS, HOW MANY PLACES HAVE YOU LIVED?: 1

## 2024-02-05 SDOH — ECONOMIC STABILITY: TRANSPORTATION INSECURITY
IN THE PAST 12 MONTHS, HAS THE LACK OF TRANSPORTATION KEPT YOU FROM MEDICAL APPOINTMENTS OR FROM GETTING MEDICATIONS?: NO

## 2024-02-05 SDOH — HEALTH STABILITY: PHYSICAL HEALTH: ON AVERAGE, HOW MANY DAYS PER WEEK DO YOU ENGAGE IN MODERATE TO STRENUOUS EXERCISE (LIKE A BRISK WALK)?: 2 DAYS

## 2024-02-05 SDOH — ECONOMIC STABILITY: INCOME INSECURITY: HOW HARD IS IT FOR YOU TO PAY FOR THE VERY BASICS LIKE FOOD, HOUSING, MEDICAL CARE, AND HEATING?: NOT HARD AT ALL

## 2024-02-05 SDOH — ECONOMIC STABILITY: TRANSPORTATION INSECURITY
IN THE PAST 12 MONTHS, HAS LACK OF TRANSPORTATION KEPT YOU FROM MEETINGS, WORK, OR FROM GETTING THINGS NEEDED FOR DAILY LIVING?: NO

## 2024-02-05 SDOH — ECONOMIC STABILITY: TRANSPORTATION INSECURITY
IN THE PAST 12 MONTHS, HAS LACK OF RELIABLE TRANSPORTATION KEPT YOU FROM MEDICAL APPOINTMENTS, MEETINGS, WORK OR FROM GETTING THINGS NEEDED FOR DAILY LIVING?: NO

## 2024-02-05 SDOH — ECONOMIC STABILITY: INCOME INSECURITY: IN THE LAST 12 MONTHS, WAS THERE A TIME WHEN YOU WERE NOT ABLE TO PAY THE MORTGAGE OR RENT ON TIME?: NO

## 2024-02-05 ASSESSMENT — SOCIAL DETERMINANTS OF HEALTH (SDOH)
WITHIN THE PAST 12 MONTHS, YOU WORRIED THAT YOUR FOOD WOULD RUN OUT BEFORE YOU GOT THE MONEY TO BUY MORE: NEVER TRUE
IN A TYPICAL WEEK, HOW MANY TIMES DO YOU TALK ON THE PHONE WITH FAMILY, FRIENDS, OR NEIGHBORS?: MORE THAN THREE TIMES A WEEK
HOW OFTEN DO YOU HAVE SIX OR MORE DRINKS ON ONE OCCASION: NEVER
HOW OFTEN DO YOU ATTENT MEETINGS OF THE CLUB OR ORGANIZATION YOU BELONG TO?: NEVER
HOW OFTEN DO YOU HAVE A DRINK CONTAINING ALCOHOL: 4 OR MORE TIMES A WEEK
IN A TYPICAL WEEK, HOW MANY TIMES DO YOU TALK ON THE PHONE WITH FAMILY, FRIENDS, OR NEIGHBORS?: MORE THAN THREE TIMES A WEEK
HOW OFTEN DO YOU GET TOGETHER WITH FRIENDS OR RELATIVES?: NEVER
HOW OFTEN DO YOU ATTEND CHURCH OR RELIGIOUS SERVICES?: NEVER
DO YOU BELONG TO ANY CLUBS OR ORGANIZATIONS SUCH AS CHURCH GROUPS UNIONS, FRATERNAL OR ATHLETIC GROUPS, OR SCHOOL GROUPS?: NO
HOW OFTEN DO YOU ATTENT MEETINGS OF THE CLUB OR ORGANIZATION YOU BELONG TO?: NEVER
HOW MANY DRINKS CONTAINING ALCOHOL DO YOU HAVE ON A TYPICAL DAY WHEN YOU ARE DRINKING: 1 OR 2
HOW OFTEN DO YOU ATTEND CHURCH OR RELIGIOUS SERVICES?: NEVER
HOW OFTEN DO YOU GET TOGETHER WITH FRIENDS OR RELATIVES?: NEVER
DO YOU BELONG TO ANY CLUBS OR ORGANIZATIONS SUCH AS CHURCH GROUPS UNIONS, FRATERNAL OR ATHLETIC GROUPS, OR SCHOOL GROUPS?: NO
HOW HARD IS IT FOR YOU TO PAY FOR THE VERY BASICS LIKE FOOD, HOUSING, MEDICAL CARE, AND HEATING?: NOT HARD AT ALL

## 2024-02-05 ASSESSMENT — LIFESTYLE VARIABLES
HOW MANY STANDARD DRINKS CONTAINING ALCOHOL DO YOU HAVE ON A TYPICAL DAY: 1 OR 2
AUDIT-C TOTAL SCORE: 4
SKIP TO QUESTIONS 9-10: 1
HOW OFTEN DO YOU HAVE A DRINK CONTAINING ALCOHOL: 4 OR MORE TIMES A WEEK
HOW OFTEN DO YOU HAVE SIX OR MORE DRINKS ON ONE OCCASION: NEVER

## 2024-02-06 ENCOUNTER — OFFICE VISIT (OUTPATIENT)
Dept: MEDICAL GROUP | Facility: PHYSICIAN GROUP | Age: 79
End: 2024-02-06
Payer: MEDICARE

## 2024-02-06 VITALS
TEMPERATURE: 97.8 F | HEIGHT: 71 IN | DIASTOLIC BLOOD PRESSURE: 70 MMHG | OXYGEN SATURATION: 97 % | WEIGHT: 176 LBS | BODY MASS INDEX: 24.64 KG/M2 | SYSTOLIC BLOOD PRESSURE: 124 MMHG | RESPIRATION RATE: 18 BRPM | HEART RATE: 74 BPM

## 2024-02-06 DIAGNOSIS — I42.8 NONISCHEMIC CARDIOMYOPATHY (HCC): ICD-10-CM

## 2024-02-06 DIAGNOSIS — Z00.00 ADULT GENERAL MEDICAL EXAM: ICD-10-CM

## 2024-02-06 DIAGNOSIS — I48.0 PAF (PAROXYSMAL ATRIAL FIBRILLATION) (HCC): ICD-10-CM

## 2024-02-06 DIAGNOSIS — R97.20 ELEVATED PSA: ICD-10-CM

## 2024-02-06 DIAGNOSIS — D68.69 SECONDARY HYPERCOAGULABILITY DISORDER (HCC): ICD-10-CM

## 2024-02-06 PROCEDURE — 99214 OFFICE O/P EST MOD 30 MIN: CPT | Performed by: FAMILY MEDICINE

## 2024-02-06 PROCEDURE — 3074F SYST BP LT 130 MM HG: CPT | Performed by: FAMILY MEDICINE

## 2024-02-06 PROCEDURE — 3078F DIAST BP <80 MM HG: CPT | Performed by: FAMILY MEDICINE

## 2024-02-06 ASSESSMENT — PATIENT HEALTH QUESTIONNAIRE - PHQ9: CLINICAL INTERPRETATION OF PHQ2 SCORE: 0

## 2024-02-06 ASSESSMENT — FIBROSIS 4 INDEX: FIB4 SCORE: 2.31

## 2024-02-06 NOTE — PROGRESS NOTES
Subjective:     CC: Here for exam and several issues.    HPI:   Babar presents today with the following medical concerns:    Adult general medical exam  Patient is here today for his medical exam and to review his lab results.  Overall he is feeling very well.    Elevated PSA  This is a new problem.  Patient's PSA is now elevated at 4.6.  2 years ago it was 3.5.  Of note he had been on tamsulosin for 1 month before getting his last value.  He was on it for BPH symptoms.    Nonischemic cardiomyopathy (HCC)  This is a chronic condition.  Is under the care of cardiology.  Doing very well at the present time.    PAF (paroxysmal atrial fibrillation)  This is a chronic stable condition.  Followed by cardiology.    Secondary hypercoagulability disorder (HCC)  This is a chronic problem.  He is on Eliquis due to atrial fibrillation.    Past Medical History:   Diagnosis Date    Allergy     Cataract     History of CVA (cerebrovascular accident) 2015    Hyperglycemia 2019    Medicare annual wellness visit, subsequent 2015    PAF (paroxysmal atrial fibrillation) (HCC) 2015    Stroke ([facility])     TIA (transient ischemic attack)        Social History     Tobacco Use    Smoking status: Former     Current packs/day: 0.00     Average packs/day: 0.5 packs/day for 2.0 years (1.0 ttl pk-yrs)     Types: Cigarettes     Start date: 1967     Quit date: 1969     Years since quittin.1    Smokeless tobacco: Never    Tobacco comments:     Started smoking at age 22   Vaping Use    Vaping Use: Never used   Substance Use Topics    Alcohol use: Yes     Alcohol/week: 4.2 oz     Types: 7 Glasses of wine per week     Comment: 2 glass of wine/night    Drug use: Never       Current Outpatient Medications Ordered in Epic   Medication Sig Dispense Refill    tamsulosin (FLOMAX) 0.4 MG capsule Take 0.4 mg once daily until stone passage occurs or for up to 4 weeks. 30 Capsule 3    Glucos-Chondroit-Hyaluron-MSM (GLUCOSAMINE  "CHONDROITIN JOINT PO) Take  by mouth.      metoprolol SR (TOPROL XL) 50 MG TABLET SR 24 HR Take 50 mg by mouth every day.      ramipril (ALTACE) 2.5 MG Cap Take 2.5 mg by mouth every day.      atorvastatin (LIPITOR) 10 MG TABS Take 10 mg by mouth every evening.      rivaroxaban (XARELTO) 20 MG Tab tablet Take 20 mg by mouth with dinner.       No current Epic-ordered facility-administered medications on file.       Allergies:  Patient has no known allergies.    Health Maintenance: Completed    ROS:  Gen: no fevers/chills, no changes in weight  Eyes: no changes in vision  ENT: no sore throat, no hearing loss, no bloody nose  Pulm: no sob, no cough  CV: no chest pain, no palpitations  GI: no nausea/vomiting, no diarrhea  : no dysuria  MSk: no myalgias  Skin: no rash  Neuro: no headaches, no numbness/tingling  Heme/Lymph: no easy bruising      Objective:       Exam:  /70 (BP Location: Right arm, Patient Position: Sitting, BP Cuff Size: Adult)   Pulse 74   Temp 36.6 °C (97.8 °F) (Temporal)   Resp 18   Ht 1.791 m (5' 10.5\")   Wt 79.8 kg (176 lb)   SpO2 97%   BMI 24.90 kg/m²  Body mass index is 24.9 kg/m².    Gen: Alert and oriented, No apparent distress.  Eyes:   Extraocular motions intact.  No scleral icterus seen.  Ears:    Hearing aids are in place.  Neck: Neck is supple without lymphadenopathy.  Thyroid exam is normal.  Lungs: Normal effort, CTA bilaterally, no wheezes, rhonchi, or rales  CV: Regular rate and rhythm. No murmurs, rubs, or gallops.  No carotid bruits heard.  Abdomen: Soft, nontender, no organomegaly or masses.  Normal bowel sounds.  Ext: No clubbing, cyanosis, edema.  Neuro: Cranial nerves II through VIII are grossly intact.  No lateralized signs are seen.  Gait is normal.      Labs: Results reviewed with patient    Assessment & Plan:     78 y.o. male with the following -     1. Elevated PSA  This is a new issue.  Referral to urology made for further evaluation.  Patient told he may end " up getting a MRI of the prostate and if cancer is suspected then a biopsy would be done.  - Referral to Urology    2. Secondary hypercoagulability disorder (HCC)  This is a chronic problem.  Continue on anticoagulant treatment.    3. Nonischemic cardiomyopathy (HCC)  This is a chronic problem.  Continue care through cardiology.    4. PAF (paroxysmal atrial fibrillation) (HCC)  This is a chronic problem.  Continue care through cardiology.    5. Adult general medical exam  Patient's medical exam today was unremarkable.  I reviewed his lab test with him.  General health issues addressed.    HCC Gap Form    Diagnosis to address: D68.69 - Secondary hypercoagulability disorder (HCC)  Assessment and plan: Chronic, stable. Continue with current defined treatment plan: Patient is on Xarelto due to history of atrial fibrillation.. Follow-up at least annually.  Diagnosis: I42.8 - Nonischemic cardiomyopathy (HCC)  Assessment and plan: Chronic, stable, as based on today's assessment and impact on other conditions evaluated today. Continue with current treatment plan: Followed by cardiology.  Follow-up with specialist as directed, but at least annually.  Diagnosis: I48.0 - PAF (paroxysmal atrial fibrillation) (HCC)  Assessment and plan: Chronic, stable, as based on today's assessment and impact on other conditions evaluated today. Continue with current treatment plan: Followed by cardiology.  Follow-up with specialist as directed, but at least annually.  Last edited 02/06/24 10:26 PST by Brian West III, M.D.         Return in about 6 months (around 8/6/2024) for Long.    Please note that this dictation was created using voice recognition software. I have made every reasonable attempt to correct obvious errors, but I expect that there are errors of grammar and possibly content that I did not discover before finalizing the note.

## 2024-02-06 NOTE — ASSESSMENT & PLAN NOTE
This is a new problem.  Patient's PSA is now elevated at 4.6.  2 years ago it was 3.5.  Of note he had been on tamsulosin for 1 month before getting his last value.  He was on it for BPH symptoms.

## 2024-02-06 NOTE — ASSESSMENT & PLAN NOTE
Patient is here today for his medical exam and to review his lab results.  Overall he is feeling very well.

## 2024-02-06 NOTE — ASSESSMENT & PLAN NOTE
This is a chronic condition.  Is under the care of cardiology.  Doing very well at the present time.

## 2024-03-11 ENCOUNTER — OFFICE VISIT (OUTPATIENT)
Dept: UROLOGY | Facility: MEDICAL CENTER | Age: 79
End: 2024-03-11
Payer: MEDICARE

## 2024-03-11 VITALS
DIASTOLIC BLOOD PRESSURE: 79 MMHG | HEART RATE: 69 BPM | OXYGEN SATURATION: 96 % | WEIGHT: 174 LBS | HEIGHT: 71 IN | TEMPERATURE: 98.9 F | SYSTOLIC BLOOD PRESSURE: 119 MMHG | BODY MASS INDEX: 24.36 KG/M2

## 2024-03-11 DIAGNOSIS — N13.8 BPH WITH OBSTRUCTION/LOWER URINARY TRACT SYMPTOMS: ICD-10-CM

## 2024-03-11 DIAGNOSIS — R33.9 INCOMPLETE BLADDER EMPTYING: ICD-10-CM

## 2024-03-11 DIAGNOSIS — N40.1 BPH WITH OBSTRUCTION/LOWER URINARY TRACT SYMPTOMS: ICD-10-CM

## 2024-03-11 DIAGNOSIS — R97.20 ELEVATED PSA: ICD-10-CM

## 2024-03-11 LAB
POC POST-VOID: 593 ML
POC PRE-VOID: 880 ML

## 2024-03-11 PROCEDURE — 3078F DIAST BP <80 MM HG: CPT | Performed by: UROLOGY

## 2024-03-11 PROCEDURE — 99204 OFFICE O/P NEW MOD 45 MIN: CPT | Performed by: UROLOGY

## 2024-03-11 PROCEDURE — 51798 US URINE CAPACITY MEASURE: CPT | Performed by: UROLOGY

## 2024-03-11 PROCEDURE — 3074F SYST BP LT 130 MM HG: CPT | Performed by: UROLOGY

## 2024-03-11 RX ORDER — FINASTERIDE 5 MG/1
5 TABLET, FILM COATED ORAL DAILY
Qty: 30 TABLET | Refills: 11 | Status: SHIPPED | OUTPATIENT
Start: 2024-03-11 | End: 2024-03-28 | Stop reason: SDUPTHER

## 2024-03-11 ASSESSMENT — FIBROSIS 4 INDEX: FIB4 SCORE: 2.34

## 2024-03-11 NOTE — PATIENT INSTRUCTIONS
-Continue tamsulosin, 1 capsule before bed  -Start finasteride 5 mg every day in the morning  -Repeat your PSA blood test in about 6 months (before your next visit)  -Follow up in 6 months

## 2024-03-11 NOTE — PROGRESS NOTES
Chief Complaint: incomplete bladder emptying    HPI: Babar Willett is a 79 y.o. male with a history of stroke and TIA, but otherwise in good health and feels well, here for an initial evaluation of BPH with difficulty emptying his bladder.     He says that he was having difficulty with hesitancy and nocturia, and in January was started on tamsulosin. This improved his flow, and also significantly improved the nocturia. He now still feels a sense of residual urine after voiding, and has learned to double-void, but otherwise has few bothersome symptoms. He has no history of UTI's or obstructive nephropathy. Renal function is normal based on last CMP and eGFR.  Urinalysis was also negative.     Interested in options to further improve bladder emptying.      Symptoms:  Frequency: no  Urgency: yes  Nocturia: no  Stress incontinence: no  Urge incontinence: no  Dysuria: no  Gross hematuria: no  Weakened stream: yes  Strain to empty: no      Past Medical History:  Past Medical History:   Diagnosis Date    Allergy     Cataract     History of CVA (cerebrovascular accident) 02/20/2015    Hyperglycemia 09/03/2019    Medicare annual wellness visit, subsequent 02/20/2015    PAF (paroxysmal atrial fibrillation) (HCC) 02/20/2015    Stroke ([facility])     TIA (transient ischemic attack)        Past Surgical History:  Past Surgical History:   Procedure Laterality Date    OTHER CARDIAC SURGERY  01/16/2015    amplatzer septal occluder    ARTHROPLASTY Left     left knee replacement    EYE SURGERY      INGUINAL HERNIA REPAIR      Hernia Repair, Inguinal    KNEE ARTHROSCOPY      VASECTOMY         Family History:  Family History   Problem Relation Age of Onset    Heart Disease Father         Arteriosclerosis     Heart Attack Father     Arthritis Sister     Dementia Sister     No Known Problems Brother     Other Mother         Gluacoma    Stroke Brother         TIA    Prostate cancer Brother     Heart Disease Sister         Pacemaker     Other Sister         Cataract    Other Brother         Down syndrome    Suicide Attempts Brother     Depression Brother     Cancer Maternal Aunt         intestinal     No Known Problems Daughter        Social History:  Social History     Socioeconomic History    Marital status:      Spouse name: Not on file    Number of children: Not on file    Years of education: Not on file    Highest education level: Bachelor's degree (e.g., BA, AB, BS)   Occupational History    Not on file   Tobacco Use    Smoking status: Former     Current packs/day: 0.00     Average packs/day: 0.5 packs/day for 2.0 years (1.0 ttl pk-yrs)     Types: Cigarettes     Start date: 1967     Quit date: 1969     Years since quittin.2    Smokeless tobacco: Never    Tobacco comments:     Started smoking at age 22   Vaping Use    Vaping Use: Never used   Substance and Sexual Activity    Alcohol use: Yes     Alcohol/week: 4.2 oz     Types: 7 Glasses of wine per week     Comment: 2 glass of wine/night    Drug use: Never    Sexual activity: Not Currently     Partners: Female   Other Topics Concern    Not on file   Social History Narrative    Not on file     Social Determinants of Health     Financial Resource Strain: Low Risk  (2024)    Overall Financial Resource Strain (CARDIA)     Difficulty of Paying Living Expenses: Not hard at all   Food Insecurity: No Food Insecurity (2024)    Hunger Vital Sign     Worried About Running Out of Food in the Last Year: Never true     Ran Out of Food in the Last Year: Never true   Transportation Needs: No Transportation Needs (2024)    PRAPARE - Transportation     Lack of Transportation (Medical): No     Lack of Transportation (Non-Medical): No   Physical Activity: Insufficiently Active (2024)    Exercise Vital Sign     Days of Exercise per Week: 2 days     Minutes of Exercise per Session: 40 min   Stress: No Stress Concern Present (2024)    [employer] -  Occupational Stress Questionnaire     Feeling of Stress : Not at all   Social Connections: Moderately Isolated (2/5/2024)    Social Connection and Isolation Panel [NHANES]     Frequency of Communication with Friends and Family: More than three times a week     Frequency of Social Gatherings with Friends and Family: Never     Attends Christianity Services: Never     Active Member of Clubs or Organizations: No     Attends Club or Organization Meetings: Never     Marital Status:    Intimate Partner Violence: Not on file   Housing Stability: Low Risk  (2/5/2024)    Housing Stability Vital Sign     Unable to Pay for Housing in the Last Year: No     Number of Places Lived in the Last Year: 1     Unstable Housing in the Last Year: No       Medications:  Current Outpatient Medications   Medication Sig Dispense Refill    finasteride (PROSCAR) 5 MG Tab Take 1 Tablet by mouth every day for 360 days. 30 Tablet 11    tamsulosin (FLOMAX) 0.4 MG capsule Take 0.4 mg once daily until stone passage occurs or for up to 4 weeks. 30 Capsule 3    Glucos-Chondroit-Hyaluron-MSM (GLUCOSAMINE CHONDROITIN JOINT PO) Take  by mouth.      metoprolol SR (TOPROL XL) 50 MG TABLET SR 24 HR Take 50 mg by mouth every day.      ramipril (ALTACE) 2.5 MG Cap Take 2.5 mg by mouth every day.      atorvastatin (LIPITOR) 10 MG TABS Take 10 mg by mouth every evening.      rivaroxaban (XARELTO) 20 MG Tab tablet Take 20 mg by mouth with dinner.       No current facility-administered medications for this visit.       Allergies:  No Known Allergies    Review of Systems:  Constitutional: Negative for fever, chills and malaise/fatigue.   HENT: Negative for congestion.    Eyes: Negative for pain.   Respiratory: Negative for cough and shortness of breath.    Cardiovascular: Negative for leg swelling.   Gastrointestinal: Negative for nausea, vomiting, abdominal pain and diarrhea.   Genitourinary: Negative for dysuria and hematuria.   Skin: Negative for rash.    Neurological: Negative for dizziness, focal weakness and headaches.   Endo/Heme/Allergies: Does not bruise/bleed easily.   Psychiatric/Behavioral: Negative for depression.  The patient is not nervous/anxious.        Physical Exam:  Vitals:    03/11/24 1114   BP: 119/79   Pulse: 69   Temp: 37.2 °C (98.9 °F)   SpO2: 96%       GENERAL: well appearing, well nourished, NAD  RESP: respiratory effort normal  ABDOMEN: soft, nontender, nondistended, no masses or organomegaly  HERNIAS: no hernias found on exam  SKIN/LYMPH: normal coloration and turgor, no suspicious skin lesions noted  NEURO/PSYCH: alert, oriented, normal speech, no focal findings or movement disorder noted  EXTREMITIES: peripheral pulses normal, no pedal edema, no clubbing or cyanosis  GENITOURINARY: normal male external genitalia, penis is normal, and circumcised  RECTAL: Normal rectal tone,, Prostate, normal size, consistency; approx. 65 Grams in size    PVR: 583 mL  (bladder scanner)     Data Review:    Labs:  POCT UA   Lab Results   Component Value Date/Time    POCCOLOR YELLOW 12/09/2023 12:41 PM    POCAPPEAR CLEAR 12/09/2023 12:41 PM    POCLEUKEST TRACE 12/09/2023 12:41 PM    POCNITRITE NEGATIVE 12/09/2023 12:41 PM    POCUROBILIGE 0.2 12/09/2023 12:41 PM    POCPROTEIN NEGATIVE 12/09/2023 12:41 PM    POCURPH 6.0 12/09/2023 12:41 PM    POCBLOOD MODERATE 12/09/2023 12:41 PM    POCSPGRV 1.020 12/09/2023 12:41 PM    POCKETONES NEGATIVE 12/09/2023 12:41 PM    POCBILIRUBIN NEGATIVE 12/09/2023 12:41 PM    POCGLUCUA NEGATIVE 12/09/2023 12:41 PM      CBC   Lab Results   Component Value Date/Time    WBC 3.8 (L) 02/01/2024 0756    RBC 4.52 (L) 02/01/2024 0756    HEMOGLOBIN 14.2 02/01/2024 0756    HEMATOCRIT 42.4 02/01/2024 0756    MCV 93.8 02/01/2024 0756    MCH 31.4 02/01/2024 0756    MCHC 33.5 02/01/2024 0756    RDW 44.5 02/01/2024 0756    MPV 9.9 02/01/2024 0756    LYMPHOCYTES 31.10 02/01/2024 0756    LYMPHS 1.17 02/01/2024 0756    MONOCYTES 12.00 02/01/2024  0756    MONOS 0.45 02/01/2024 0756    EOSINOPHILS 1.90 02/01/2024 0756    EOS 0.07 02/01/2024 0756    BASOPHILS 0.50 02/01/2024 0756    BASO 0.02 02/01/2024 0756    NRBC 0.00 02/01/2024 0756     CMP   Lab Results   Component Value Date/Time    SODIUM 138 02/01/2024 0756    POTASSIUM 4.7 02/01/2024 0756    CHLORIDE 103 02/01/2024 0756    CO2 25 02/01/2024 0756    ANION 10.0 02/01/2024 0756    GLUCOSE 90 02/01/2024 0756    BUN 13 02/01/2024 0756    CREATININE 0.89 02/01/2024 0756    GFRCKD 88 02/01/2024 0756    CALCIUM 9.5 02/01/2024 0756    CORRCALC 9.6 02/01/2024 0756    ASTSGOT 30 02/01/2024 0756    ALTSGPT 23 02/01/2024 0756    ALKPHOSPHAT 46 02/01/2024 0756    TBILIRUBIN 0.6 02/01/2024 0756    ALBUMIN 3.9 02/01/2024 0756    TOTPROTEIN 6.7 02/01/2024 0756    GLOBULIN 2.8 02/01/2024 0756    AGRATIO 1.4 02/01/2024 0756     BMP   Lab Results   Component Value Date/Time    SODIUM 138 02/01/2024 0756    POTASSIUM 4.7 02/01/2024 0756    CHLORIDE 103 02/01/2024 0756    CO2 25 02/01/2024 0756    GLUCOSE 90 02/01/2024 0756    BUN 13 02/01/2024 0756    CREATININE 0.89 02/01/2024 0756    CALCIUM 9.5 02/01/2024 0756     PSA   Lab Results   Component Value Date/Time    PSATOTAL 4.63 (H) 02/01/2024 0756       Assessment: 79 y.o. male with a history of BPH with high post-void residual, and hesitancy and nocturia that have improved with tamsulosin use. Despite the tamsulosin, his PVR remains markedly elevated at nearly 600 cc. We discussed treatment options including TURP, self-catheterization, and medical therapy with finasteride. He'd like to avoid any procedures and has few bothersome symptoms, so at first he'd like to trial therapy with finasteride. We discussed potential adverse effects; he has not been sexually active in over a year, and so is not concerned with loss of libido or erectile function.       Plan:    -Start finasteride 5 mg PO daily  -Continue tamsulosin, 0.4 mg nightly  -Repeat PSA in 6 months  -Follow up in 6  months      Migel Sutton MD

## 2024-03-28 DIAGNOSIS — R97.20 ELEVATED PSA: ICD-10-CM

## 2024-03-28 DIAGNOSIS — R33.9 INCOMPLETE BLADDER EMPTYING: ICD-10-CM

## 2024-03-28 DIAGNOSIS — R39.11 URINARY HESITANCY: ICD-10-CM

## 2024-03-28 DIAGNOSIS — N13.8 BPH WITH OBSTRUCTION/LOWER URINARY TRACT SYMPTOMS: ICD-10-CM

## 2024-03-28 DIAGNOSIS — N40.1 BPH WITH OBSTRUCTION/LOWER URINARY TRACT SYMPTOMS: ICD-10-CM

## 2024-03-28 RX ORDER — FINASTERIDE 5 MG/1
5 TABLET, FILM COATED ORAL DAILY
Qty: 90 TABLET | Refills: 3 | Status: SHIPPED | OUTPATIENT
Start: 2024-03-28 | End: 2025-03-23

## 2024-03-28 RX ORDER — TAMSULOSIN HYDROCHLORIDE 0.4 MG/1
CAPSULE ORAL
Qty: 90 CAPSULE | Refills: 3 | Status: SHIPPED | OUTPATIENT
Start: 2024-03-28

## 2024-03-28 NOTE — TELEPHONE ENCOUNTER
Received request via: Patient    Was the patient seen in the last year in this department? Yes    Does the patient have an active prescription (recently filled or refills available) for medication(s) requested? No    Pharmacy Name: [facility] [address]    Does the patient have [facility] Plus and need 100 day supply (blood pressure, diabetes and cholesterol meds only)? Patient does not have SCP

## 2024-04-04 ENCOUNTER — HOSPITAL ENCOUNTER (OUTPATIENT)
Dept: LAB | Facility: MEDICAL CENTER | Age: 79
End: 2024-04-04
Attending: UROLOGY
Payer: MEDICARE

## 2024-04-04 LAB
BUN SERPL-MCNC: 15 MG/DL (ref 8–22)
CREAT SERPL-MCNC: 0.88 MG/DL (ref 0.5–1.4)
GFR SERPLBLD CREATININE-BSD FMLA CKD-EPI: 88 ML/MIN/1.73 M 2

## 2024-04-04 PROCEDURE — 84153 ASSAY OF PSA TOTAL: CPT

## 2024-04-04 PROCEDURE — 82565 ASSAY OF CREATININE: CPT

## 2024-04-04 PROCEDURE — 84520 ASSAY OF UREA NITROGEN: CPT

## 2024-04-04 PROCEDURE — 36415 COLL VENOUS BLD VENIPUNCTURE: CPT

## 2024-04-05 LAB — PSA SERPL-MCNC: 6.24 NG/ML (ref 0–4)

## 2024-04-26 ENCOUNTER — OFFICE VISIT (OUTPATIENT)
Dept: URGENT CARE | Facility: CLINIC | Age: 79
End: 2024-04-26
Payer: MEDICARE

## 2024-04-26 VITALS
DIASTOLIC BLOOD PRESSURE: 58 MMHG | HEIGHT: 71 IN | RESPIRATION RATE: 16 BRPM | SYSTOLIC BLOOD PRESSURE: 108 MMHG | BODY MASS INDEX: 24.5 KG/M2 | WEIGHT: 175 LBS | HEART RATE: 96 BPM | OXYGEN SATURATION: 95 % | TEMPERATURE: 100.2 F

## 2024-04-26 DIAGNOSIS — J06.9 UPPER RESPIRATORY TRACT INFECTION, UNSPECIFIED TYPE: ICD-10-CM

## 2024-04-26 PROCEDURE — 3078F DIAST BP <80 MM HG: CPT

## 2024-04-26 PROCEDURE — 99213 OFFICE O/P EST LOW 20 MIN: CPT

## 2024-04-26 PROCEDURE — 3074F SYST BP LT 130 MM HG: CPT

## 2024-04-26 RX ORDER — BENZONATATE 100 MG/1
100 CAPSULE ORAL 3 TIMES DAILY PRN
Qty: 30 CAPSULE | Refills: 0 | Status: SHIPPED | OUTPATIENT
Start: 2024-04-26 | End: 2024-05-06

## 2024-04-26 ASSESSMENT — FIBROSIS 4 INDEX: FIB4 SCORE: 2.34

## 2024-04-26 NOTE — PROGRESS NOTES
"Chief Complaint   Patient presents with    Cough     Green mucous,, at home fever of 100 x 3 days          Subjective:   HISTORY OF PRESENT ILLNESS: Babar Willett is a 79 y.o. male who presents for cough and chills x 3 days   Patient denies fevers or lung disease.      Medications, Allergies, current problem list, Social and Family history reviewed today in Epic.     Objective:     /58 (BP Location: Left arm, Patient Position: Sitting, BP Cuff Size: Adult)   Pulse 96   Temp 37.9 °C (100.2 °F) (Temporal)   Resp 16   Ht 1.803 m (5' 11\")   Wt 79.4 kg (175 lb)   SpO2 95%     Physical Exam  Vitals reviewed.   Constitutional:       Appearance: Normal appearance. He is not toxic-appearing.   HENT:      Head:      Jaw: No trismus.      Right Ear: Tympanic membrane normal.      Left Ear: Tympanic membrane normal.      Nose: Rhinorrhea present.      Mouth/Throat:      Mouth: Mucous membranes are moist.      Pharynx: Uvula midline. Posterior oropharyngeal erythema present. No pharyngeal swelling, oropharyngeal exudate or uvula swelling.      Tonsils: No tonsillar exudate or tonsillar abscesses. 1+ on the right. 1+ on the left.      Comments: No muffled voice, trismus, unilateral deviation of the uvula, soft palate fullness or edema. No oral airway compromise, or drooling noted.   Eyes:      Conjunctiva/sclera: Conjunctivae normal.   Cardiovascular:      Rate and Rhythm: Normal rate and regular rhythm.      Heart sounds: Normal heart sounds.   Pulmonary:      Effort: Pulmonary effort is normal. No respiratory distress.      Breath sounds: Normal breath sounds. No decreased breath sounds or wheezing.   Musculoskeletal:      Cervical back: Full passive range of motion without pain and neck supple.   Skin:     General: Skin is warm and dry.   Neurological:      Mental Status: He is alert and oriented to person, place, and time.   Psychiatric:         Mood and Affect: Mood normal.          Assessment/Plan: "     Diagnosis and associated orders    I personally reviewed prior external notes and test results pertinent to today's visit.     1. Upper respiratory tract infection, unspecified type  benzonatate (TESSALON) 100 MG Cap    CANCELED: POCT CoV-2, Flu A/B, RSV by PCR            IMPRESSION:  Pt has stable vital signs and no red flag symptoms or exam findings identified.   Informed pt that symptoms are consistent with a viral illness and are self limiting.  Informed that no antibiotics are indicated at this time.  Educated on duration of illness and indications to RTC.  Recommended supportive therapies and preferred OTC medications for symptomatic relief.  Pt declined testing, he is well appearing so I think that is totally appropriate.   Advised to rest and push oral fluids    Differential diagnosis discussed. Pt was Educated on red flag symptoms. Pt has been Instructed to return to Urgent Care or nearest Emergency Department if symptoms fail to improve, for any change in condition, further concerns, or new concerning symptoms. Patient states understanding of the plan of care and discharge instructions.  They are discharged in stable condition.         Please note that this dictation was created using voice recognition software. I have made a reasonable attempt to correct obvious errors, but I expect that there are errors of grammar and possibly content that I did not discover before finalizing the note.    This note was electronically signed by LAQUITA Morales

## 2024-05-21 RX ADMIN — GLUCAGON 1 MG: 1 INJECTION, POWDER, LYOPHILIZED, FOR SOLUTION INTRAMUSCULAR; INTRAVENOUS at 13:14

## 2024-05-26 ENCOUNTER — HOSPITAL ENCOUNTER (OUTPATIENT)
Dept: RADIOLOGY | Facility: MEDICAL CENTER | Age: 79
End: 2024-05-21
Attending: UROLOGY
Payer: MEDICARE

## 2024-05-26 DIAGNOSIS — R97.20 ELEVATED PROSTATE SPECIFIC ANTIGEN (PSA): ICD-10-CM

## 2024-05-26 RX ADMIN — GADOTERIDOL 16 ML: 279.3 INJECTION, SOLUTION INTRAVENOUS at 13:49

## 2024-05-26 RX ADMIN — GLUCAGON 1 MG: 1 INJECTION, POWDER, LYOPHILIZED, FOR SOLUTION INTRAMUSCULAR; INTRAVENOUS at 12:20

## 2024-07-08 ENCOUNTER — HOSPITAL ENCOUNTER (OUTPATIENT)
Facility: MEDICAL CENTER | Age: 79
End: 2024-07-08
Attending: UROLOGY
Payer: MEDICARE

## 2024-07-08 PROCEDURE — 87186 SC STD MICRODIL/AGAR DIL: CPT

## 2024-07-08 PROCEDURE — 87077 CULTURE AEROBIC IDENTIFY: CPT

## 2024-07-08 PROCEDURE — 87086 URINE CULTURE/COLONY COUNT: CPT

## 2024-07-11 LAB
BACTERIA UR CULT: ABNORMAL
BACTERIA UR CULT: ABNORMAL
SIGNIFICANT IND 70042: ABNORMAL
SITE SITE: ABNORMAL
SOURCE SOURCE: ABNORMAL

## 2024-08-06 ENCOUNTER — OFFICE VISIT (OUTPATIENT)
Dept: MEDICAL GROUP | Facility: PHYSICIAN GROUP | Age: 79
End: 2024-08-06
Payer: MEDICARE

## 2024-08-06 VITALS
BODY MASS INDEX: 24.62 KG/M2 | RESPIRATION RATE: 16 BRPM | TEMPERATURE: 98.3 F | HEART RATE: 72 BPM | WEIGHT: 172 LBS | DIASTOLIC BLOOD PRESSURE: 68 MMHG | OXYGEN SATURATION: 97 % | HEIGHT: 70 IN | SYSTOLIC BLOOD PRESSURE: 126 MMHG

## 2024-08-06 DIAGNOSIS — N40.1 BENIGN PROSTATIC HYPERPLASIA WITH URINARY RETENTION: ICD-10-CM

## 2024-08-06 DIAGNOSIS — R33.8 BENIGN PROSTATIC HYPERPLASIA WITH URINARY RETENTION: ICD-10-CM

## 2024-08-06 DIAGNOSIS — Z00.00 MEDICARE ANNUAL WELLNESS VISIT, SUBSEQUENT: Primary | ICD-10-CM

## 2024-08-06 PROCEDURE — G0439 PPPS, SUBSEQ VISIT: HCPCS | Performed by: FAMILY MEDICINE

## 2024-08-06 PROCEDURE — 3078F DIAST BP <80 MM HG: CPT | Performed by: FAMILY MEDICINE

## 2024-08-06 PROCEDURE — 3074F SYST BP LT 130 MM HG: CPT | Performed by: FAMILY MEDICINE

## 2024-08-06 ASSESSMENT — PATIENT HEALTH QUESTIONNAIRE - PHQ9: CLINICAL INTERPRETATION OF PHQ2 SCORE: 0

## 2024-08-06 ASSESSMENT — FIBROSIS 4 INDEX: FIB4 SCORE: 2.34

## 2024-08-06 ASSESSMENT — ACTIVITIES OF DAILY LIVING (ADL): BATHING_REQUIRES_ASSISTANCE: 0

## 2024-08-06 ASSESSMENT — ENCOUNTER SYMPTOMS: GENERAL WELL-BEING: EXCELLENT

## 2024-08-06 NOTE — ASSESSMENT & PLAN NOTE
This is now a chronic problem.  Patient is follow-up with urology regularly.  He was found to have a very large prostate.  His PSA had also been going up but a MRI did not show any suspicious lesions.  He is currently using self catheters and is due to see the urologist tomorrow about surgical options.  He was tried on tamsulosin which helped a little.  They added on finasteride and he had lots of side effects so had to discontinue that.  
coffee

## 2024-08-06 NOTE — PROGRESS NOTES
Chief Complaint   Patient presents with    Annual Wellness Visit       HPI:  Babar Willett is a 79 y.o. here for Medicare Annual Wellness Visit   Benign prostatic hyperplasia with urinary retention  This is now a chronic problem.  Patient is follow-up with urology regularly.  He was found to have a very large prostate.  His PSA had also been going up but a MRI did not show any suspicious lesions.  He is currently using self catheters and is due to see the urologist tomorrow about surgical options.  He was tried on tamsulosin which helped a little.  They added on finasteride and he had lots of side effects so had to discontinue that.        Patient Active Problem List    Diagnosis Date Noted    Benign prostatic hyperplasia with urinary retention 08/06/2024    Elevated PSA 02/06/2024    Sensorineural hearing loss (SNHL) of both ears 10/04/2023    Adult general medical exam 10/04/2023    History of repair of congenital atrial septal defect (ASD) 04/11/2023    Moderate tricuspid valve regurgitation 04/11/2023    Nonischemic cardiomyopathy (HCC) 04/11/2023    History of adenomatous polyp of colon 02/15/2022    Secondary hypercoagulability disorder (HCC) 07/15/2021    Hemianopsia 07/15/2021    Hyperlipidemia 02/22/2016    Essential hypertension, benign 08/21/2015    History of CVA (cerebrovascular accident) 02/20/2015    PAF (paroxysmal atrial fibrillation) ([facility]) 02/20/2015       Current Outpatient Medications   Medication Sig Dispense Refill    tamsulosin (FLOMAX) 0.4 MG capsule Take 0.4 mg once daily until stone passage occurs or for up to 4 weeks. 90 Capsule 3    Glucos-Chondroit-Hyaluron-MSM (GLUCOSAMINE CHONDROITIN JOINT PO) Take  by mouth.      metoprolol SR (TOPROL XL) 50 MG TABLET SR 24 HR Take 50 mg by mouth every day.      ramipril (ALTACE) 2.5 MG Cap Take 2.5 mg by mouth every day.      atorvastatin (LIPITOR) 10 MG TABS Take 10 mg by mouth every evening.      rivaroxaban (XARELTO) 20 MG Tab tablet Take 20  mg by mouth with dinner.       No current facility-administered medications for this visit.          Current supplements as per medication list.     Allergies: Patient has no known allergies.    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      He  reports that he quit smoking about 55 years ago. His smoking use included cigarettes. He started smoking about 57 years ago. He has a 1 pack-year smoking history. He has never used smokeless tobacco. He reports current alcohol use of about 4.2 oz of alcohol per week. He reports that he does not use drugs.  Counseling given: Not Answered  Tobacco comments: Started smoking at age 22      ROS:    Gait: Uses no assistive device  Ostomy: No  Other tubes: No  Amputations: No  Chronic oxygen use: No  Last eye exam: 02/2024  Wears hearing aids: Yes   : Denies any urinary leakage during the last 6 months    Screening:  done  Depression Screening  Little interest or pleasure in doing things?     Feeling down, depressed , or hopeless? 0 - not at all  Patient Health Questionnaire Score: 0     If depressive symptoms identified deferred to follow up visit unless specifically addressed in assessment and plan.    Interpretation of PHQ-9 Total Score   Score Severity   1-4 No Depression   5-9 Mild Depression   10-14 Moderate Depression   15-19 Moderately Severe Depression   20-27 Severe Depression    Screening for Cognitive Impairment  Do you or any of your friends or family members have any concern about your memory? No  Three Minute Recall (Leader, Season, Table) 2/3 Stated season, table and forgot the first one.   [place] clock face with all 12 numbers and set the hands to show 10 minutes after 11.  Yes 5/5  Cognitive concerns identified deferred for follow up unless specifically addressed in assessment and plan.    Fall Risk Assessment  Has the patient had two or more falls in the last year or any fall with injury in the last year?  No    Safety  Assessment  Do you always wear your seatbelt?  Yes  Any changes to home needed to function safely? No  Difficulty hearing.  Yes  Patient counseled about all safety risks that were identified.    Functional Assessment ADLs  Are there any barriers preventing you from cooking for yourself or meeting nutritional needs?  No.    Are there any barriers preventing you from driving safely or obtaining transportation?  No.    Are there any barriers preventing you from using a telephone or calling for help?  No    Are there any barriers preventing you from shopping?  No.    Are there any barriers preventing you from taking care of your own finances?  No    Are there any barriers preventing you from managing your medications?  No    Are there any barriers preventing you from showering, bathing or dressing yourself? No    Are there any barriers preventing you from doing housework or laundry? No  Are there any barriers preventing you from using the toilet?No  Are you currently engaging in any exercise or physical activity?  Yes. 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 Excellent    Do you sleep more than six hours a night? Yes    In the past 7 days, how much did pain keep you from doing your normal work? None    Do you spend quality time with family or friends (virtually or in person)? Yes    Do you usually eat a heart healthy diet that constists of a variety of fruits, vegetables, whole grains and fiber? Yes    Do you eat foods high in fat and/or Fast Food more than three times per week? No    How concerned are you that your medical conditions are not being well managed? Not at all    Are you worried that in the next 2 months, you may not have stable housing that you own, rent, or stay in as part of a household? No      Advance Care Planning  Do you have an Advance Directive, Living Will, Durable Power of , or POLST? Yes  Advance Directive       is not on file -  instructed patient to bring in a copy to scan into their chart      Health Maintenance Summary            Postponed - COVID-19 Vaccine (3 - 2023-24 season) Postponed until 10/4/2024      03/16/2021  Imm Admin: PFIZER PURPLE CAP SARS-COV-2 VACCINATION (12+)    02/23/2021  Imm Admin: PFIZER PURPLE CAP SARS-COV-2 VACCINATION (12+)              Influenza Vaccine (1) Next due on 9/1/2024      10/12/2023  Imm Admin: Influenza Vaccine Adult HD    09/25/2022  Imm Admin: Influenza, Unspecified - HISTORICAL DATA    10/08/2021  Imm Admin: Influenza Vaccine Adult HD    11/15/2020  Imm Admin: Influenza, Unspecified - HISTORICAL DATA    10/04/2020  Imm Admin: Influenza Vaccine Quad Inj (Pf)    Only the first 5 history entries have been loaded, but more history exists.              Annual Wellness Visit (Yearly) Next due on 8/6/2025 08/06/2024  Level of Service: ANNUAL WELLNESS VISIT-INCLUDES PPPS SUBSEQUE*    08/06/2024  Visit Dx: Medicare annual wellness visit, subsequent    10/04/2023  Done    10/04/2023  Level of Service: [place] ANNUAL WELLNESS VISIT-INCLUDES PPPS SUBSEQUE*    07/15/2021  Prob Dx: Medicare annual wellness visit, subsequent    Only the first 5 history entries have been loaded, but more history exists.              IMM DTaP/Tdap/Td Vaccine (3 - Td or Tdap) Next due on 3/9/2033      03/09/2023  Imm Admin: Tdap Vaccine    02/05/2013  Imm Admin: Tdap Vaccine              Zoster (Shingles) Vaccines (Series Information) Completed      05/18/2019  Imm Admin: Zoster Vaccine Recombinant (RZV) (SHINGRIX)    02/24/2019  Imm Admin: Zoster Vaccine Recombinant (RZV) (SHINGRIX)              Pneumococcal Vaccine: 65+ Years (Series Information) Completed      05/18/2019  Imm Admin: Pneumococcal polysaccharide vaccine (PPSV-23)    10/17/2015  Imm Admin: Pneumococcal Conjugate Vaccine (Prevnar/PCV-13)    12/30/2014  Imm Admin: Pneumococcal polysaccharide vaccine (PPSV-23)    09/20/2014  Imm Admin: Pneumococcal polysaccharide  vaccine (PPSV-23)    2014  Imm Admin: Pneumococcal polysaccharide vaccine (PPSV-23)    Only the first 5 history entries have been loaded, but more history exists.              Hepatitis C Screening  Tentatively Complete      2020  Hepatitis C Antibody component of HEP C VIRUS ANTIBODY              Hepatitis A Vaccine (Hep A) (Series Information) Aged Out      No completion history exists for this topic.              HPV Vaccines (Series Information) Aged Out      No completion history exists for this topic.              Polio Vaccine (Inactivated Polio) (Series Information) Aged Out      No completion history exists for this topic.              Meningococcal Immunization (Series Information) Aged Out      No completion history exists for this topic.              Discontinued - Colorectal Cancer Screening  Discontinued        Frequency changed to Never automatically (Topic No Longer Applies)    2022  AMB EXTERNAL COLONOSCOPY RESULTS    2022  AMB EXTERNAL COLONOSCOPY RESULTS    2021  REFERRAL TO GI FOR COLONOSCOPY    2021  REFERRAL TO GI FOR COLONOSCOPY    Only the first 5 history entries have been loaded, but more history exists.              Discontinued - Hepatitis B Vaccine (Hep B)  Discontinued      No completion history exists for this topic.                    Patient Care Team:  Brian West III, M.D. as PCP - General (Family Medicine)  Chava Velez D.O. as Consulting Physician (Cardiovascular Disease (Cardiology))  Deborah Alan O.D. as Consulting Physician (Optometry)  JONATHAN Darden. as Consulting Physician (Audiologist)  Howard Mckeon M.D. as Consulting Physician (Orthopedic Surgery)        Social History     Tobacco Use    Smoking status: Former     Current packs/day: 0.00     Average packs/day: 0.5 packs/day for 2.0 years (1.0 ttl pk-yrs)     Types: Cigarettes     Start date: 1967     Quit date: 1969     Years since quittin.6     "Smokeless tobacco: Never    Tobacco comments:     Started smoking at age 22   Vaping Use    Vaping status: Never Used   Substance Use Topics    Alcohol use: Yes     Alcohol/week: 4.2 oz     Types: 7 Glasses of wine per week     Comment: 1-2 glass of wine/night    Drug use: Never     Family History   Problem Relation Age of Onset    Heart Disease Father         Arteriosclerosis     Heart Attack Father     Arthritis Sister     Dementia Sister     No Known Problems Brother     Other Mother         Gluacoma    Stroke Brother         TIA    Prostate cancer Brother     Heart Disease Sister         Pacemaker    Other Sister         Cataract    Other Brother         Down syndrome    Suicide Attempts Brother     Depression Brother     Cancer Maternal Aunt         intestinal     No Known Problems Daughter      He  has a past medical history of Allergy, Cataract, History of CVA (cerebrovascular accident) (02/20/2015), Hyperglycemia (09/03/2019), Medicare annual wellness visit, subsequent (02/20/2015), PAF (paroxysmal atrial fibrillation) ([facility]) (02/20/2015), Stroke ([facility]), and TIA (transient ischemic attack).   Past Surgical History:   Procedure Laterality Date    OTHER CARDIAC SURGERY  01/16/2015    amplatzer septal occluder    ARTHROPLASTY Left     left knee replacement    EYE SURGERY      INGUINAL HERNIA REPAIR      Hernia Repair, Inguinal    KNEE ARTHROSCOPY      VASECTOMY         Exam:   /68 (BP Location: Left arm, Patient Position: Sitting, BP Cuff Size: Adult)   Pulse 72   Temp 36.8 °C (98.3 °F) (Temporal)   Resp 16   Ht 1.783 m (5' 10.2\")   Wt 78 kg (172 lb)   SpO2 97%  Body mass index is 24.54 kg/m².    Hearing good.    Dentition good  Alert, oriented in no acute distress.  Eye contact is good, speech goal directed, affect calm    Assessment and Plan. The following treatment and monitoring plan is recommended:    1. Medicare annual wellness visit, subsequent  Patient's annual wellness exam was " unremarkable.    2. Benign prostatic hyperplasia with urinary retention  This is a chronic problem.  Discussed his treatment options and he will discuss it further with his urologist tomorrow.  Also it would be very cautious when he does his self catheter as sometimes it can create a false channel if he is not careful.      Services suggested: No services needed at this time  Health Care Screening: Age-appropriate preventive services recommended by USPTF and ACIP covered by Medicare were discussed today. Services ordered if indicated and agreed upon by the patient.  Referrals offered: Community-based lifestyle interventions to reduce health risks and promote self-management and wellness, fall prevention, nutrition, physical activity, tobacco-use cessation, weight loss, and mental health services as per orders if indicated.    Discussion today about general wellness and lifestyle habits:    Prevent falls and reduce trip hazards; Cautioned about securing or removing rugs.  Have a working fire alarm and carbon monoxide detector;   Engage in regular physical activity and social activities     Follow-up: Return in about 6 months (around 2/6/2025).

## 2024-12-16 DIAGNOSIS — R33.9 INCOMPLETE BLADDER EMPTYING: ICD-10-CM

## 2024-12-16 DIAGNOSIS — N40.1 BPH WITH OBSTRUCTION/LOWER URINARY TRACT SYMPTOMS: ICD-10-CM

## 2024-12-16 DIAGNOSIS — N13.8 BPH WITH OBSTRUCTION/LOWER URINARY TRACT SYMPTOMS: ICD-10-CM

## 2024-12-16 RX ORDER — FINASTERIDE 5 MG/1
5 TABLET, FILM COATED ORAL
Qty: 30 TABLET | Refills: 11 | Status: SHIPPED | OUTPATIENT
Start: 2024-12-16

## 2025-02-11 ENCOUNTER — APPOINTMENT (OUTPATIENT)
Dept: MEDICAL GROUP | Facility: PHYSICIAN GROUP | Age: 80
End: 2025-02-11
Payer: MEDICARE

## 2025-02-11 VITALS
OXYGEN SATURATION: 97 % | BODY MASS INDEX: 24.77 KG/M2 | DIASTOLIC BLOOD PRESSURE: 68 MMHG | SYSTOLIC BLOOD PRESSURE: 118 MMHG | HEART RATE: 62 BPM | HEIGHT: 71 IN | WEIGHT: 176.9 LBS | TEMPERATURE: 97.9 F | RESPIRATION RATE: 16 BRPM

## 2025-02-11 DIAGNOSIS — E78.00 PURE HYPERCHOLESTEROLEMIA: ICD-10-CM

## 2025-02-11 DIAGNOSIS — R97.20 ELEVATED PSA: ICD-10-CM

## 2025-02-11 DIAGNOSIS — Z12.5 PROSTATE CANCER SCREENING: ICD-10-CM

## 2025-02-11 DIAGNOSIS — I48.0 PAF (PAROXYSMAL ATRIAL FIBRILLATION) (HCC): ICD-10-CM

## 2025-02-11 DIAGNOSIS — I10 ESSENTIAL HYPERTENSION, BENIGN: ICD-10-CM

## 2025-02-11 DIAGNOSIS — D68.69 SECONDARY HYPERCOAGULABILITY DISORDER (HCC): ICD-10-CM

## 2025-02-11 PROBLEM — R33.8 BENIGN PROSTATIC HYPERPLASIA WITH URINARY RETENTION: Status: RESOLVED | Noted: 2024-08-06 | Resolved: 2025-02-11

## 2025-02-11 PROBLEM — N40.1 BENIGN PROSTATIC HYPERPLASIA WITH URINARY RETENTION: Status: RESOLVED | Noted: 2024-08-06 | Resolved: 2025-02-11

## 2025-02-11 PROCEDURE — 3074F SYST BP LT 130 MM HG: CPT | Performed by: FAMILY MEDICINE

## 2025-02-11 PROCEDURE — 3078F DIAST BP <80 MM HG: CPT | Performed by: FAMILY MEDICINE

## 2025-02-11 PROCEDURE — 99214 OFFICE O/P EST MOD 30 MIN: CPT | Performed by: FAMILY MEDICINE

## 2025-02-11 ASSESSMENT — FIBROSIS 4 INDEX: FIB4 SCORE: 2.34

## 2025-02-11 ASSESSMENT — PATIENT HEALTH QUESTIONNAIRE - PHQ9: CLINICAL INTERPRETATION OF PHQ2 SCORE: 0

## 2025-02-11 NOTE — PROGRESS NOTES
Subjective:     CC: Here for exam and lab work.    HPI:   Babar presents today with the following medical concerns:    Elevated PSA  This is a chronic problem now being followed by urology.  He did have his TURP last year and states he is very pleased with the results.  He is due for follow-up lab.    Essential hypertension, benign  This is a chronic stable condition.  Blood pressure is well-controlled on current medications.    Hyperlipidemia  This is a chronic problem.  Patient is on a statin.  Labs will be ordered for follow-up.    PAF (paroxysmal atrial fibrillation)  This is a chronic problem.  Followed by cardiology.    Secondary hypercoagulability disorder (HCC)  This is a chronic problem.  Patient is on Xarelto due to history of atrial fibrillation.    Past Medical History:   Diagnosis Date    Allergy     Cataract     History of CVA (cerebrovascular accident) 2015    Hyperglycemia 2019    Medicare annual wellness visit, subsequent 2015    PAF (paroxysmal atrial fibrillation) (HCC) 2015    Stroke (HCC)     TIA (transient ischemic attack)        Social History     Tobacco Use    Smoking status: Former     Current packs/day: 0.00     Average packs/day: 0.5 packs/day for 2.0 years (1.0 ttl pk-yrs)     Types: Cigarettes     Start date: 1967     Quit date: 1969     Years since quittin.1    Smokeless tobacco: Never    Tobacco comments:     Started smoking at age 22   Vaping Use    Vaping status: Never Used   Substance Use Topics    Alcohol use: Yes     Alcohol/week: 4.2 oz     Types: 7 Glasses of wine per week     Comment: 1-2 glass of wine/night    Drug use: Never       Current Outpatient Medications Ordered in Epic   Medication Sig Dispense Refill    Glucos-Chondroit-Hyaluron-MSM (GLUCOSAMINE CHONDROITIN JOINT PO) Take  by mouth.      metoprolol SR (TOPROL XL) 50 MG TABLET SR 24 HR Take 50 mg by mouth every day.      ramipril (ALTACE) 2.5 MG Cap Take 2.5 mg by mouth every day.  "     atorvastatin (LIPITOR) 10 MG TABS Take 10 mg by mouth every evening.      rivaroxaban (XARELTO) 20 MG Tab tablet Take 20 mg by mouth with dinner.      tamsulosin (FLOMAX) 0.4 MG capsule Take 0.4 mg once daily until stone passage occurs or for up to 4 weeks. 90 Capsule 3     No current Epic-ordered facility-administered medications on file.       Allergies:  Patient has no known allergies.    Health Maintenance: Completed    ROS:  Gen: no fevers/chills, no changes in weight  Eyes: no changes in vision  ENT: no sore throat, no hearing loss, no bloody nose  Pulm: no sob, no cough  CV: no chest pain, no palpitations  GI: no nausea/vomiting, no diarrhea  : no dysuria  MSk: no myalgias  Skin: no rash  Neuro: no headaches, no numbness/tingling  Heme/Lymph: no easy bruising      Objective:       Exam:  /68 (BP Location: Left arm, Patient Position: Sitting, BP Cuff Size: Adult)   Pulse 62   Temp 36.6 °C (97.9 °F) (Temporal)   Resp 16   Ht 1.803 m (5' 11\")   Wt 80.2 kg (176 lb 14.4 oz)   SpO2 97%   BMI 24.67 kg/m²  Body mass index is 24.67 kg/m².    Gen: Alert and oriented, No apparent distress.  Eyes:   Extraocular motions intact.  No scleral icterus seen.  Ears:    Ear canals and TMs are normal.  Hearing aids are in place.  Neck: Neck is supple without lymphadenopathy.  Thyroid exam is normal.  Lungs: Normal effort, CTA bilaterally, no wheezes, rhonchi, or rales  CV: Regular rate and rhythm. No murmurs, rubs, or gallops.  No carotid bruits heard.  Abdomen: Soft, nontender, and organomegaly or masses.  Ext: No clubbing, cyanosis, edema.  Neuro: Cranial nerves II through VIII are gross intact.  No lateralized signs are seen.  Gait is normal.    Labs: Ordered    Assessment & Plan:     79 y.o. male with the following -     1. Essential hypertension, benign  This is a chronic stable condition.  Continue current medications.  - Comp Metabolic Panel; Future  - Lipid Profile; Future  - URINALYSIS,CULTURE IF " INDICATED; Future  - ESTIMATED GFR; Future  - CBC WITH DIFFERENTIAL; Future    2. Prostate cancer screening  This is a chronic problem.  Lab ordered.  Continue follow-up with urology.  - PROSTATE SPECIFIC AG SCREENING; Future    3. Elevated PSA  This is a chronic problem.  Continue follow-up with urology.    4. PAF (paroxysmal atrial fibrillation) (HCC)  This is a chronic problem.  Continue care through cardiology.    5. Secondary hypercoagulability disorder (HCC)  This is a chronic problem.  Continue current medications.    6. Pure hypercholesterolemia  This is a chronic problem.  Lab ordered for follow-up.      Return in about 6 months (around 8/11/2025) for Long.    Please note that this dictation was created using voice recognition software. I have made every reasonable attempt to correct obvious errors, but I expect that there are errors of grammar and possibly content that I did not discover before finalizing the note.

## 2025-02-11 NOTE — ASSESSMENT & PLAN NOTE
This is a chronic problem now being followed by urology.  He did have his TURP last year and states he is very pleased with the results.  He is due for follow-up lab.

## 2025-03-31 ENCOUNTER — APPOINTMENT (OUTPATIENT)
Dept: URBAN - METROPOLITAN AREA CLINIC 4 | Facility: CLINIC | Age: 80
Setting detail: DERMATOLOGY
End: 2025-03-31

## 2025-03-31 DIAGNOSIS — L82.1 OTHER SEBORRHEIC KERATOSIS: ICD-10-CM

## 2025-03-31 DIAGNOSIS — L57.0 ACTINIC KERATOSIS: ICD-10-CM

## 2025-03-31 DIAGNOSIS — Z85.828 PERSONAL HISTORY OF OTHER MALIGNANT NEOPLASM OF SKIN: ICD-10-CM

## 2025-03-31 DIAGNOSIS — L82.0 INFLAMED SEBORRHEIC KERATOSIS: ICD-10-CM

## 2025-03-31 PROBLEM — D48.5 NEOPLASM OF UNCERTAIN BEHAVIOR OF SKIN: Status: ACTIVE | Noted: 2025-03-31

## 2025-03-31 PROCEDURE — ? COUNSELING

## 2025-03-31 PROCEDURE — ? DEFER

## 2025-03-31 PROCEDURE — 17003 DESTRUCT PREMALG LES 2-14: CPT | Mod: 59

## 2025-03-31 PROCEDURE — ? ADDITIONAL NOTES

## 2025-03-31 PROCEDURE — ? LIQUID NITROGEN

## 2025-03-31 PROCEDURE — 17000 DESTRUCT PREMALG LESION: CPT | Mod: 59

## 2025-03-31 PROCEDURE — ? OBSERVATION

## 2025-03-31 PROCEDURE — ? PHOTO-DOCUMENTATION

## 2025-03-31 PROCEDURE — 99213 OFFICE O/P EST LOW 20 MIN: CPT | Mod: 25

## 2025-03-31 PROCEDURE — 17110 DESTRUCTION B9 LES UP TO 14: CPT

## 2025-03-31 ASSESSMENT — LOCATION DETAILED DESCRIPTION DERM
LOCATION DETAILED: RIGHT SUPERIOR CENTRAL MALAR CHEEK
LOCATION DETAILED: LEFT LATERAL MALAR CHEEK
LOCATION DETAILED: RIGHT INFERIOR POSTERIOR HELIX
LOCATION DETAILED: RIGHT INFERIOR CENTRAL MALAR CHEEK
LOCATION DETAILED: LEFT SUPERIOR FOREHEAD
LOCATION DETAILED: LEFT MEDIAL TEMPLE
LOCATION DETAILED: LEFT SUPERIOR CENTRAL MALAR CHEEK
LOCATION DETAILED: RIGHT MEDIAL UPPER BACK
LOCATION DETAILED: RIGHT INFERIOR MEDIAL FOREHEAD
LOCATION DETAILED: LEFT ANTIHELIX
LOCATION DETAILED: RIGHT LATERAL INFERIOR EYELID
LOCATION DETAILED: RIGHT FOREHEAD
LOCATION DETAILED: LEFT INFERIOR LATERAL MALAR CHEEK
LOCATION DETAILED: RIGHT CENTRAL EYEBROW

## 2025-03-31 ASSESSMENT — LOCATION SIMPLE DESCRIPTION DERM
LOCATION SIMPLE: RIGHT CHEEK
LOCATION SIMPLE: RIGHT UPPER BACK
LOCATION SIMPLE: LEFT TEMPLE
LOCATION SIMPLE: RIGHT EAR
LOCATION SIMPLE: LEFT CHEEK
LOCATION SIMPLE: RIGHT FOREHEAD
LOCATION SIMPLE: RIGHT EYEBROW
LOCATION SIMPLE: RIGHT INFERIOR EYELID
LOCATION SIMPLE: LEFT FOREHEAD
LOCATION SIMPLE: LEFT EAR

## 2025-03-31 ASSESSMENT — LOCATION ZONE DERM
LOCATION ZONE: FACE
LOCATION ZONE: EYELID
LOCATION ZONE: TRUNK
LOCATION ZONE: EAR

## 2025-04-15 ENCOUNTER — HOSPITAL ENCOUNTER (OUTPATIENT)
Dept: LAB | Facility: MEDICAL CENTER | Age: 80
End: 2025-04-15
Attending: FAMILY MEDICINE
Payer: MEDICARE

## 2025-04-15 DIAGNOSIS — I10 ESSENTIAL HYPERTENSION, BENIGN: ICD-10-CM

## 2025-04-15 DIAGNOSIS — Z12.5 PROSTATE CANCER SCREENING: ICD-10-CM

## 2025-04-15 LAB
ALBUMIN SERPL BCP-MCNC: 4 G/DL (ref 3.2–4.9)
ALBUMIN/GLOB SERPL: 1.5 G/DL
ALP SERPL-CCNC: 58 U/L (ref 30–99)
ALT SERPL-CCNC: 34 U/L (ref 2–50)
ANION GAP SERPL CALC-SCNC: 8 MMOL/L (ref 7–16)
APPEARANCE UR: CLEAR
AST SERPL-CCNC: 39 U/L (ref 12–45)
BACTERIA #/AREA URNS HPF: ABNORMAL /HPF
BASOPHILS # BLD AUTO: 0.6 % (ref 0–1.8)
BASOPHILS # BLD: 0.03 K/UL (ref 0–0.12)
BILIRUB SERPL-MCNC: 0.7 MG/DL (ref 0.1–1.5)
BILIRUB UR QL STRIP.AUTO: NEGATIVE
BUN SERPL-MCNC: 14 MG/DL (ref 8–22)
CALCIUM ALBUM COR SERPL-MCNC: 9.2 MG/DL (ref 8.5–10.5)
CALCIUM SERPL-MCNC: 9.2 MG/DL (ref 8.5–10.5)
CASTS URNS QL MICRO: ABNORMAL /LPF (ref 0–2)
CHLORIDE SERPL-SCNC: 107 MMOL/L (ref 96–112)
CHOLEST SERPL-MCNC: 171 MG/DL (ref 100–199)
CO2 SERPL-SCNC: 26 MMOL/L (ref 20–33)
COLOR UR: YELLOW
CREAT SERPL-MCNC: 0.97 MG/DL (ref 0.5–1.4)
EOSINOPHIL # BLD AUTO: 0.09 K/UL (ref 0–0.51)
EOSINOPHIL NFR BLD: 1.9 % (ref 0–6.9)
EPITHELIAL CELLS 1715: ABNORMAL /HPF (ref 0–5)
ERYTHROCYTE [DISTWIDTH] IN BLOOD BY AUTOMATED COUNT: 46.1 FL (ref 35.9–50)
FASTING STATUS PATIENT QL REPORTED: NORMAL
GFR SERPLBLD CREATININE-BSD FMLA CKD-EPI: 80 ML/MIN/1.73 M 2
GLOBULIN SER CALC-MCNC: 2.7 G/DL (ref 1.9–3.5)
GLUCOSE SERPL-MCNC: 100 MG/DL (ref 65–99)
GLUCOSE UR STRIP.AUTO-MCNC: NEGATIVE MG/DL
HCT VFR BLD AUTO: 43.6 % (ref 42–52)
HDLC SERPL-MCNC: 92 MG/DL
HGB BLD-MCNC: 14.8 G/DL (ref 14–18)
IMM GRANULOCYTES # BLD AUTO: 0.02 K/UL (ref 0–0.11)
IMM GRANULOCYTES NFR BLD AUTO: 0.4 % (ref 0–0.9)
KETONES UR STRIP.AUTO-MCNC: NEGATIVE MG/DL
LDLC SERPL CALC-MCNC: 67 MG/DL
LEUKOCYTE ESTERASE UR QL STRIP.AUTO: ABNORMAL
LYMPHOCYTES # BLD AUTO: 0.91 K/UL (ref 1–4.8)
LYMPHOCYTES NFR BLD: 19 % (ref 22–41)
MCH RBC QN AUTO: 32.5 PG (ref 27–33)
MCHC RBC AUTO-ENTMCNC: 33.9 G/DL (ref 32.3–36.5)
MCV RBC AUTO: 95.6 FL (ref 81.4–97.8)
MICRO URNS: ABNORMAL
MONOCYTES # BLD AUTO: 0.44 K/UL (ref 0–0.85)
MONOCYTES NFR BLD AUTO: 9.2 % (ref 0–13.4)
NEUTROPHILS # BLD AUTO: 3.3 K/UL (ref 1.82–7.42)
NEUTROPHILS NFR BLD: 68.9 % (ref 44–72)
NITRITE UR QL STRIP.AUTO: NEGATIVE
NRBC # BLD AUTO: 0 K/UL
NRBC BLD-RTO: 0 /100 WBC (ref 0–0.2)
PH UR STRIP.AUTO: 6 [PH] (ref 5–8)
PLATELET # BLD AUTO: 197 K/UL (ref 164–446)
PMV BLD AUTO: 9.4 FL (ref 9–12.9)
POTASSIUM SERPL-SCNC: 5.1 MMOL/L (ref 3.6–5.5)
PROT SERPL-MCNC: 6.7 G/DL (ref 6–8.2)
PROT UR QL STRIP: NEGATIVE MG/DL
PSA SERPL DL<=0.01 NG/ML-MCNC: 2.06 NG/ML (ref 0–4)
RBC # BLD AUTO: 4.56 M/UL (ref 4.7–6.1)
RBC # URNS HPF: ABNORMAL /HPF (ref 0–2)
RBC UR QL AUTO: NEGATIVE
SODIUM SERPL-SCNC: 141 MMOL/L (ref 135–145)
SP GR UR STRIP.AUTO: 1.01
TRIGL SERPL-MCNC: 59 MG/DL (ref 0–149)
UROBILINOGEN UR STRIP.AUTO-MCNC: 0.2 EU/DL
WBC # BLD AUTO: 4.8 K/UL (ref 4.8–10.8)
WBC #/AREA URNS HPF: ABNORMAL /HPF

## 2025-04-15 PROCEDURE — 81001 URINALYSIS AUTO W/SCOPE: CPT

## 2025-04-15 PROCEDURE — 85025 COMPLETE CBC W/AUTO DIFF WBC: CPT

## 2025-04-15 PROCEDURE — 80053 COMPREHEN METABOLIC PANEL: CPT

## 2025-04-15 PROCEDURE — 80061 LIPID PANEL: CPT

## 2025-04-15 PROCEDURE — 84153 ASSAY OF PSA TOTAL: CPT | Mod: GA

## 2025-04-15 PROCEDURE — 36415 COLL VENOUS BLD VENIPUNCTURE: CPT

## 2025-04-16 ENCOUNTER — RESULTS FOLLOW-UP (OUTPATIENT)
Dept: MEDICAL GROUP | Facility: PHYSICIAN GROUP | Age: 80
End: 2025-04-16

## 2025-06-09 ENCOUNTER — APPOINTMENT (OUTPATIENT)
Dept: URBAN - METROPOLITAN AREA CLINIC 4 | Facility: CLINIC | Age: 80
Setting detail: DERMATOLOGY
End: 2025-06-09

## 2025-06-09 DIAGNOSIS — L57.0 ACTINIC KERATOSIS: ICD-10-CM

## 2025-06-09 DIAGNOSIS — L82.0 INFLAMED SEBORRHEIC KERATOSIS: ICD-10-CM

## 2025-06-09 PROBLEM — D48.5 NEOPLASM OF UNCERTAIN BEHAVIOR OF SKIN: Status: ACTIVE | Noted: 2025-06-09

## 2025-06-09 PROCEDURE — ? COUNSELING

## 2025-06-09 PROCEDURE — ? BIOPSY BY SHAVE METHOD

## 2025-06-09 PROCEDURE — ? LIQUID NITROGEN

## 2025-06-09 ASSESSMENT — LOCATION DETAILED DESCRIPTION DERM
LOCATION DETAILED: RIGHT LATERAL INFERIOR EYELID
LOCATION DETAILED: LEFT CENTRAL POSTERIOR NECK
LOCATION DETAILED: RIGHT SUPERIOR LATERAL NECK

## 2025-06-09 ASSESSMENT — LOCATION ZONE DERM
LOCATION ZONE: EYELID
LOCATION ZONE: NECK

## 2025-06-09 ASSESSMENT — LOCATION SIMPLE DESCRIPTION DERM
LOCATION SIMPLE: RIGHT INFERIOR EYELID
LOCATION SIMPLE: NECK

## 2025-06-09 NOTE — PROCEDURE: LIQUID NITROGEN

## 2025-07-07 ENCOUNTER — APPOINTMENT (OUTPATIENT)
Dept: URBAN - METROPOLITAN AREA CLINIC 4 | Facility: CLINIC | Age: 80
Setting detail: DERMATOLOGY
End: 2025-07-07

## 2025-07-07 PROBLEM — C44.519 BASAL CELL CARCINOMA OF SKIN OF OTHER PART OF TRUNK: Status: ACTIVE | Noted: 2025-07-07

## 2025-07-07 PROCEDURE — ? CURETTAGE AND DESTRUCTION

## 2025-07-07 PROCEDURE — ? COUNSELING

## 2025-07-07 NOTE — PROCEDURE: CURETTAGE AND DESTRUCTION
Detail Level: Detailed
Accession # (Optional): [id number]
Number Of Curettages: 3
Size Of Lesion In Cm: 1
Size Of Lesion After Curettage: 1.3
Add Intralesional Injection: No
Concentration (Mg/Ml Or Millions Of Plaque Forming Units/Cc): 0.01
Anesthesia Type: 1% lidocaine with epinephrine
Anesthesia Volume In Cc: 0.5
Cautery Type: electrodesiccation
What Was Performed First?: Curettage
Final Size Statement: The size of the lesion after curettage was
Additional Information: (Optional): The wound was cleaned, and a pressure dressing was applied.  The patient received detailed post-op instructions.
Consent was obtained from the patient. The risks, benefits and alternatives to therapy were discussed in detail. Specifically, the risks of infection, scarring, bleeding, prolonged wound healing, nerve injury, incomplete removal, allergy to anesthesia and recurrence were addressed. Alternatives to ED&C, such as: surgical removal and XRT were also discussed.  Prior to the procedure, the treatment site was clearly identified and confirmed by the patient. All components of Universal Protocol/PAUSE Rule completed.
Post-Care Instructions: I reviewed with the patient in detail post-care instructions. Patient is to keep the area dry for 48 hours, and not to engage in any swimming until the area is healed. Should the patient develop any fevers, chills, bleeding, severe pain patient will contact the office immediately.
Bill As A Line Item Or As Units: Line Item

## 2025-07-15 ENCOUNTER — APPOINTMENT (OUTPATIENT)
Dept: URBAN - METROPOLITAN AREA CLINIC 4 | Facility: CLINIC | Age: 80
Setting detail: DERMATOLOGY
End: 2025-07-15

## 2025-07-15 DIAGNOSIS — Z48.817 ENCOUNTER FOR SURGICAL AFTERCARE FOLLOWING SURGERY ON THE SKIN AND SUBCUTANEOUS TISSUE: ICD-10-CM

## 2025-07-15 PROCEDURE — ? ADDITIONAL NOTES

## 2025-07-15 PROCEDURE — ? PRESCRIPTION

## 2025-07-15 PROCEDURE — ? COUNSELING

## 2025-07-15 RX ORDER — MUPIROCIN 20 MG/G
OINTMENT TOPICAL BID
Qty: 22 | Refills: 0 | Status: ERX | COMMUNITY
Start: 2025-07-15

## 2025-07-15 RX ADMIN — MUPIROCIN: 20 OINTMENT TOPICAL at 00:00

## 2025-07-15 ASSESSMENT — LOCATION DETAILED DESCRIPTION DERM: LOCATION DETAILED: RIGHT SUPERIOR MEDIAL LOWER BACK

## 2025-07-15 ASSESSMENT — LOCATION SIMPLE DESCRIPTION DERM: LOCATION SIMPLE: RIGHT LOWER BACK

## 2025-07-15 ASSESSMENT — LOCATION ZONE DERM: LOCATION ZONE: TRUNK

## 2025-07-15 NOTE — PROCEDURE: ADDITIONAL NOTES
Rounded on patient at this time. Addressed pain, potty, PO, and position according to physicians orders. Patient denies any further needs at this time.  Call light within reach of patient, will continue to monitor     Shawna Dejesus RN  03/28/19 1800
Detail Level: Simple
Render Risk Assessment In Note?: no
Additional Notes: Erythema circled with a surgical marker. To contact me if progression of the erythema prior to next visit.

## 2025-07-21 ENCOUNTER — APPOINTMENT (OUTPATIENT)
Dept: URBAN - METROPOLITAN AREA CLINIC 4 | Facility: CLINIC | Age: 80
Setting detail: DERMATOLOGY
End: 2025-07-21

## 2025-07-21 DIAGNOSIS — Z48.817 ENCOUNTER FOR SURGICAL AFTERCARE FOLLOWING SURGERY ON THE SKIN AND SUBCUTANEOUS TISSUE: ICD-10-CM

## 2025-07-21 PROCEDURE — ? COUNSELING

## 2025-07-21 PROCEDURE — ? ADDITIONAL NOTES

## 2025-07-21 ASSESSMENT — LOCATION DETAILED DESCRIPTION DERM: LOCATION DETAILED: RIGHT SUPERIOR MEDIAL LOWER BACK

## 2025-07-21 ASSESSMENT — LOCATION ZONE DERM: LOCATION ZONE: TRUNK

## 2025-07-21 ASSESSMENT — LOCATION SIMPLE DESCRIPTION DERM: LOCATION SIMPLE: RIGHT LOWER BACK

## 2025-07-21 NOTE — PROCEDURE: ADDITIONAL NOTES
Detail Level: Simple
Render Risk Assessment In Note?: no
Additional Notes: Improvement, can change dressings to once a day and switching over to paper tape due to reaction from adhesives. Bacitracin and paper tape applied today, dressings given for home, will recheck in 1 month.

## 2025-08-18 ENCOUNTER — APPOINTMENT (OUTPATIENT)
Dept: URBAN - METROPOLITAN AREA CLINIC 4 | Facility: CLINIC | Age: 80
Setting detail: DERMATOLOGY
End: 2025-08-18

## 2025-08-18 DIAGNOSIS — Z48.817 ENCOUNTER FOR SURGICAL AFTERCARE FOLLOWING SURGERY ON THE SKIN AND SUBCUTANEOUS TISSUE: ICD-10-CM | Status: RESOLVED

## 2025-08-18 PROCEDURE — ? COUNSELING

## 2025-08-18 ASSESSMENT — LOCATION SIMPLE DESCRIPTION DERM: LOCATION SIMPLE: RIGHT LOWER BACK

## 2025-08-18 ASSESSMENT — LOCATION DETAILED DESCRIPTION DERM: LOCATION DETAILED: RIGHT SUPERIOR MEDIAL LOWER BACK

## 2025-08-18 ASSESSMENT — LOCATION ZONE DERM: LOCATION ZONE: TRUNK

## 2025-08-19 ENCOUNTER — OFFICE VISIT (OUTPATIENT)
Dept: URGENT CARE | Facility: CLINIC | Age: 80
End: 2025-08-19
Payer: MEDICARE

## 2025-08-19 ENCOUNTER — APPOINTMENT (OUTPATIENT)
Dept: RADIOLOGY | Facility: IMAGING CENTER | Age: 80
End: 2025-08-19
Payer: MEDICARE

## 2025-08-19 VITALS
OXYGEN SATURATION: 97 % | BODY MASS INDEX: 23.8 KG/M2 | TEMPERATURE: 97.2 F | WEIGHT: 170 LBS | HEIGHT: 71 IN | RESPIRATION RATE: 20 BRPM | DIASTOLIC BLOOD PRESSURE: 58 MMHG | SYSTOLIC BLOOD PRESSURE: 80 MMHG | HEART RATE: 68 BPM

## 2025-08-19 DIAGNOSIS — R42 LIGHTHEADEDNESS: Primary | ICD-10-CM

## 2025-08-19 DIAGNOSIS — R42 LIGHTHEADEDNESS: ICD-10-CM

## 2025-08-19 PROCEDURE — 3078F DIAST BP <80 MM HG: CPT

## 2025-08-19 PROCEDURE — 3074F SYST BP LT 130 MM HG: CPT

## 2025-08-19 PROCEDURE — 93000 ELECTROCARDIOGRAM COMPLETE: CPT

## 2025-08-19 PROCEDURE — 99215 OFFICE O/P EST HI 40 MIN: CPT

## 2025-08-19 PROCEDURE — 71046 X-RAY EXAM CHEST 2 VIEWS: CPT | Mod: TC | Performed by: RADIOLOGY

## 2025-08-19 ASSESSMENT — FIBROSIS 4 INDEX: FIB4 SCORE: 2.65
